# Patient Record
Sex: MALE | Race: WHITE | NOT HISPANIC OR LATINO | Employment: FULL TIME | URBAN - METROPOLITAN AREA
[De-identification: names, ages, dates, MRNs, and addresses within clinical notes are randomized per-mention and may not be internally consistent; named-entity substitution may affect disease eponyms.]

---

## 2017-05-24 ENCOUNTER — OFFICE VISIT (OUTPATIENT)
Dept: PULMONOLOGY | Facility: CLINIC | Age: 37
End: 2017-05-24
Payer: COMMERCIAL

## 2017-05-24 VITALS
WEIGHT: 237 LBS | DIASTOLIC BLOOD PRESSURE: 78 MMHG | OXYGEN SATURATION: 97 % | BODY MASS INDEX: 28.86 KG/M2 | HEART RATE: 85 BPM | HEIGHT: 76 IN | SYSTOLIC BLOOD PRESSURE: 122 MMHG

## 2017-05-24 DIAGNOSIS — J32.9 SINUSITIS, UNSPECIFIED CHRONICITY, UNSPECIFIED LOCATION: Primary | ICD-10-CM

## 2017-05-24 DIAGNOSIS — R05.9 COUGH: Primary | ICD-10-CM

## 2017-05-24 PROCEDURE — 99999 PR PBB SHADOW E&M-EST. PATIENT-LVL III: CPT | Mod: PBBFAC,,, | Performed by: INTERNAL MEDICINE

## 2017-05-24 PROCEDURE — 99204 OFFICE O/P NEW MOD 45 MIN: CPT | Mod: 25,S$GLB,, | Performed by: INTERNAL MEDICINE

## 2017-05-24 PROCEDURE — 1160F RVW MEDS BY RX/DR IN RCRD: CPT | Mod: S$GLB,,, | Performed by: INTERNAL MEDICINE

## 2017-05-24 NOTE — PROGRESS NOTES
Subjective:       Patient ID: Juan José Ng is a 36 y.o. male.    Chief Complaint: No chief complaint on file.    36 year old female with hx of chronic sinusitis and sinus surgeries. Patient states that his symptoms started when he was 14 years old. He has had rhinitis and nasal drainage for many years. He feels no allergy medicines have improved his symptoms. He follows with Dr. Ring, ENT at [facility]. He has not had insurance until recently and so he hasn't had appropriate follow up. He has had a negative allergy and immunodeficiency work up. He continues to have nasal drainage and yellowish sputum. His private ENT suggested he come to [facility] for bronchoscopy. He has been on multiple courses of antibiotics. Denies fever, chills or unintentional weight loss. His sinus issues have caused him significant depression and debility.       Review of Systems   Constitutional: Positive for fatigue and weakness. Negative for chills, activity change, appetite change and night sweats.   HENT: Positive for nosebleeds, postnasal drip, rhinorrhea, sinus pressure and congestion.    Eyes: Positive for redness and itching.   Respiratory: Positive for cough, shortness of breath and asthma nighttime symptoms. Negative for wheezing and dyspnea on extertion.    Cardiovascular: Negative for chest pain and leg swelling.   Endocrine: Negative for cold intolerance and heat intolerance.    Musculoskeletal: Negative for myalgias.   Skin: Negative for rash.   Gastrointestinal: Negative for nausea and vomiting.   Neurological: Positive for weakness, light-headedness and headaches.   Hematological: Bleeds easily. No excessive bruising.   Psychiatric/Behavioral: Positive for sleep disturbance. Negative for confusion. The patient is nervous/anxious.        Past Medical History:   Diagnosis Date    Allergy     Anxiety     Depression     Recurrent upper respiratory infection (URI)      Past Surgical History:   Procedure Laterality Date     "APPENDECTOMY      SINUS SURGERY       Family History   Problem Relation Age of Onset    Hypertension Father      Social History     Social History    Marital status: Significant Other     Spouse name: N/A    Number of children: N/A    Years of education: N/A     Occupational History    Not on file.     Social History Main Topics    Smoking status: Never Smoker    Smokeless tobacco: Never Used    Alcohol use Yes    Drug use: Unknown    Sexual activity: Not on file     Other Topics Concern    Not on file     Social History Narrative    No narrative on file     Review of patient's allergies indicates:  No Known Allergies      Objective:       Vitals:    05/24/17 1001   BP: 122/78   Pulse: 85   SpO2: 97%   Weight: 107.5 kg (236 lb 15.9 oz)   Height: 6' 4" (1.93 m)       Physical Exam   Constitutional: He is oriented to person, place, and time. He appears well-developed and well-nourished. No distress.   HENT:   Head: Normocephalic.   Nose: Mucosal edema present. Polyps are present.   Neck: Normal range of motion.   Cardiovascular: Normal rate and regular rhythm.    Pulmonary/Chest: Normal expansion, effort normal and breath sounds normal.   Abdominal: Soft. Bowel sounds are normal. He exhibits no mass. There is no guarding.   Musculoskeletal: Normal range of motion. He exhibits no edema or deformity.   Neurological: He is alert and oriented to person, place, and time.   Skin: Skin is warm and dry. He is not diaphoretic.   Psychiatric: He has a normal mood and affect. His behavior is normal.   Nursing note and vitals reviewed.    Personal Diagnostic Review  none pertinent  No flowsheet data found.      Assessment:       1. Sinusitis, unspecified chronicity, unspecified location        Outpatient Encounter Prescriptions as of 5/24/2017   Medication Sig Dispense Refill    levofloxacin (LEVAQUIN) 500 MG tablet Take 500 mg by mouth once daily.      omeprazole (PRILOSEC) 40 MG capsule Take 1 capsule (40 mg " total) by mouth every morning. 30 capsule 3     No facility-administered encounter medications on file as of 5/24/2017.      Orders Placed This Encounter   Procedures    Ambulatory consult to ENT     Referral Priority:   Routine     Referral Type:   Consultation     Referral Reason:   Specialty Services Required     Requested Specialty:   Otolaryngology     Number of Visits Requested:   1     Plan:          36 year old male with     Sinusitis- Patient has significant symptoms of chronic sinusitis. He has had extensive sinus surgery. I suspect the vast majority of his symptoms are related to his sinuses. He has had a negative immunodeficiency work up, negative CT scans and normal PFTs. I suggested patient get a repeat CT chest if he wanted to get re evaluated. He would like a referral to ENT prior to getting another CT chest. He states that flonase causes him nosebleeds and antihistamines are ineffective.   -ENT referral    Follow up after ENT evaluation.     Rasta Payne MD

## 2017-07-05 ENCOUNTER — OFFICE VISIT (OUTPATIENT)
Dept: OTOLARYNGOLOGY | Facility: CLINIC | Age: 37
End: 2017-07-05
Payer: COMMERCIAL

## 2017-07-05 VITALS
HEART RATE: 76 BPM | WEIGHT: 237.19 LBS | DIASTOLIC BLOOD PRESSURE: 85 MMHG | BODY MASS INDEX: 28.87 KG/M2 | SYSTOLIC BLOOD PRESSURE: 127 MMHG

## 2017-07-05 DIAGNOSIS — J31.0 CHRONIC RHINITIS: Primary | ICD-10-CM

## 2017-07-05 DIAGNOSIS — J32.0 CHRONIC MAXILLARY SINUSITIS: ICD-10-CM

## 2017-07-05 PROCEDURE — 99999 PR PBB SHADOW E&M-EST. PATIENT-LVL III: CPT | Mod: PBBFAC,,, | Performed by: OTOLARYNGOLOGY

## 2017-07-05 PROCEDURE — 87075 CULTR BACTERIA EXCEPT BLOOD: CPT

## 2017-07-05 PROCEDURE — 31231 NASAL ENDOSCOPY DX: CPT | Mod: S$GLB,,, | Performed by: OTOLARYNGOLOGY

## 2017-07-05 PROCEDURE — 87070 CULTURE OTHR SPECIMN AEROBIC: CPT

## 2017-07-05 PROCEDURE — 87077 CULTURE AEROBIC IDENTIFY: CPT

## 2017-07-05 PROCEDURE — 87186 SC STD MICRODIL/AGAR DIL: CPT

## 2017-07-05 PROCEDURE — 99244 OFF/OP CNSLTJ NEW/EST MOD 40: CPT | Mod: 25,S$GLB,, | Performed by: OTOLARYNGOLOGY

## 2017-07-05 NOTE — PROCEDURES
Nasal/sinus endoscopy  Date/Time: 7/5/2017 4:07 PM  Performed by: BLAZE SHAW  Authorized by: BLAZE SHAW     Consent Done?:  Yes (Verbal)  Anesthesia:     Local anesthetic:  Lidocaine 4% and Chris-Synephrine 1/2%    Patient tolerance:  Patient tolerated the procedure well with no immediate complications  Nose:     Procedure Performed:  Nasal Endoscopy  External:      No external nasal deformity  Intranasal:      Mucosa no polyps     Mucosa ulcers not present     No mucosa lesions present     Turbinates not enlarged     No septum gross deformity  Nasopharynx:      No mucosa lesions     Adenoids not present     Posterior choanae patent     Eustachian tube patent     Mostly patent sinuses bilaterally.  Diffuse mucosal edema, worse on left.  Thick white mucopurulent exudate pooling in left maxillary sinus, swabbed for culture.

## 2017-07-05 NOTE — LETTER
2017    Rasta Payne MD  [address]  [address] [place] [address]           OTOLARYNGOLOGY AND COMMUNICATION SCIENCES    Rey Rubio MD, FACS          SURGICAL AND MEDICAL DISEASES OF HEAD AND NECK  MD Rey Salas MD, FACS  Doroteo Celestin III, MD    OTOLOGY, NEUROTOLOGY and SKULL-BASE SURGERY  Eliceo Conn MD, FACS  Yoshi Goss MD, Director    PEDIATRIC OTOLARYNGOLOGY & OTOLOGY  BRIANA Meehan MD, FAAP  Karina Steen MD, FACS    FACIAL PLASTIC and RECONSTRUCTIVE SURGERY  ELLIE Oliveros III, MD, FACS    RHINOLOGY and SINUS SURGERY  Freddy Herrera MD, MPH, FACS  Director   ELLIE Oliveros III, MD, FACS    LARYNGOLOGY  Chalo Childers MD    SPEECH-LANGUAGE PATHOLOGY  Shayna Harper, PhD, [facility]-SLP  Melba López, MS, CCC-SLP  Arlette Rhodes, MS, CCC-SLP  Ally Chou MA, [facility]-SLP    AUDIOLOGY SECTION  Heather Chin, MS, CCC-A  LO Bauer, CCC-A  Rylee Gonzalez, Aurelia, CCC-A  Aurelia Renee, CCC-A  Freddie Espinal Jr., LO, CCA-A  LO Tijerina, CCC-A  Aurelia Morillo, CCC-A    [facility]  Head and Neck Surgical Oncology  RAMEZ Lloyd, FNP-C  Pedatric Otolaryngology  RAMEZ Bender, PNP-C       Re:  Juan José Ng  :  1980    Dear Dr. Payne,    Thank you for referring your patient, Juan José Ng, to us for evaluation and treatment.  I have enclosed a copy of my clinic note for your review and records.  If you have any questions please do not hesitate to contact our office.     Thank you for allowing me to participate in the care of your patient.    Sincerely,        Freddy Herrera MD, MPH, FACS    Director, Rhinology and Sinus Surgery  Department of Otorhinolaryngology  [facility]    Encl:  Progress note       [facility] [address] [place] [address]  phone [phone]  fax [phone]   [email or web address]

## 2017-07-05 NOTE — PROGRESS NOTES
Subjective:      Juan José Ng is a 36 y.o. male who was referred to me by Dr. Rasta Payne in consultation for sinusitis. Patient notes a lifelong history of chronic rhinosinusitis. He states he had a surgery done in 1998 which he believes was endoscopic sinus surgery, however he states it did not improve his symptoms. He notes history facial pressure/facial pain L>R, decreased sense of smell, intermittent nasal obstruction, constant nasal congestion and frequent nose blowing with large amounts of yellow mucus. States his symptoms affect his quality of life and result in missing work, difficulty concentrating, fatigue, and depression. Denies history of allergy, states he has previously been allergy tested and results were negative.     Patient states he has a history of antibiotic resistant sinus infections including pasteurella. Has been on multiple medications including doxycycline, multiple 3rd generation cephalosporins, levaquin, levofloxacin, and metronidazole. States he has at least 3-4 acute worsening of his chronic sinus disease each year requiring antibiotic therapy. Notes at one point he was on continuous cyclical antibiotic therapy.     Notes he has been tested for immune deficiencies in the past including CF, ciliary dyskinesia, and more which were all negative.  Patient states he has tried multiple medications. In addition to multiple yearly courses of antibiotics and oral steroids he has attempted oral antihistamines and nasal steroid spray. Notes the nasal steroid have always resulted in epistaxis.       SNOT-22 score = 72, NOSE score = 80%    Global QOL = 35%    Days missed = Less than 5    PTC = deferred      Past Medical History  He has a past medical history of Allergy; Anxiety; Depression; and Recurrent upper respiratory infection (URI).    Past Surgical History  He has a past surgical history that includes Appendectomy and Sinus surgery.    Family History  His family history  includes Hypertension in his father.    Social History  He reports that he has never smoked. He has never used smokeless tobacco. He reports that he drinks alcohol.    Allergies  He has No Known Allergies.    Medications   He currently has no medications in their medication list.    Review of Systems  Review of Systems   Constitutional: Negative for chills, fatigue, fever and unexpected weight change.   HENT: Positive for congestion, postnasal drip, rhinorrhea, sinus pressure and sneezing. Negative for dental problem, ear discharge, ear pain, facial swelling, hearing loss, hoarse voice, nosebleeds, sore throat, tinnitus, trouble swallowing and voice change.    Eyes: Negative for photophobia, pain, discharge, redness, itching and visual disturbance.   Respiratory: Negative for apnea, cough, shortness of breath and wheezing.    Cardiovascular: Negative for chest pain and palpitations.   Gastrointestinal: Negative for abdominal pain, nausea and vomiting.   Endocrine: Negative for cold intolerance and heat intolerance.   Genitourinary: Negative for difficulty urinating.   Musculoskeletal: Negative for arthralgias, back pain, myalgias, neck pain and neck stiffness.   Skin: Negative.  Negative for rash.   Allergic/Immunologic: Negative for environmental allergies and food allergies.   Neurological: Negative for dizziness, seizures, syncope, weakness and headaches.   Hematological: Negative for adenopathy. Does not bruise/bleed easily.   Psychiatric/Behavioral: Positive for dysphoric mood. Negative for decreased concentration and sleep disturbance. The patient is not nervous/anxious.           Objective:     /85   Pulse 76   Wt 107.6 kg (237 lb 3.4 oz)   BMI 28.87 kg/m²        Constitutional:   Vital signs are normal. He appears well-developed and well-nourished. He is cooperative. Normal speech.  No hoarse voice.      Head:  Normocephalic. Salivary glands normal.  Facial strength is normal.      Ears:  Right ear  hearing normal to normal and whispered voice; external ear normal without scars, lesions, or masses; ear canal, tympanic membrane, and middle ear normal. and left ear hearing normal to normal and whispered voice; external ear normal without scars, lesions, or masses; ear canal, tympanic membrane, and middle ear normal..   Right Ear: No drainage or tenderness. Tympanic membrane is not perforated. Tympanic membrane mobility is normal. No middle ear effusion. No decreased hearing is noted.   Left Ear: No drainage or tenderness. Tympanic membrane is not perforated. Tympanic membrane mobility is normal.  No middle ear effusion. No decreased hearing is noted.     Nose:  Mucosal edema present. No rhinorrhea, septal deviation or polyps. No epistaxis. Turbinates normal, no turbinate masses and no turbinate hypertrophy.  Right sinus exhibits no maxillary sinus tenderness and no frontal sinus tenderness. Left sinus exhibits no maxillary sinus tenderness and no frontal sinus tenderness.     Mouth/Throat  Oropharynx clear and moist without lesions or asymmetry, normal uvula midline, lips, teeth, and gums normal and oropharynx normal. He does not have dentures. Normal dentition. No oral lesions or mucous membrane lesions. No oropharyngeal exudate or posterior oropharyngeal erythema. Mirror exam not performed due to patient tolerance.  Mirror exam not performed due to patient tolerance.      Neck:  Neck normal without thyromegaly masses, asymmetry, normal tracheal structure, crepitus, and tenderness, thyroid normal, trachea normal, full range of motion with neck supple and no adenopathy. Thyroid tenderness is present. Normal range of motion and no crepitus present.     He has no cervical adenopathy.     Cardiovascular:   Regular rhythm and normal pulses.      Pulmonary/Chest:   Effort normal and effort and breath sounds normal. No respiratory distress.     Psychiatric:   He has a normal mood and affect. His speech is normal and  behavior is normal.     Neurological:   No cranial nerve deficit.     Skin:   No rash noted.       Procedure    Nasal endoscopy performed.  See procedure note.      Left nasal valve     Left MT prior resection with mucus, swabbed for culture     Left ethmoid     Right nasal valve     Right ethmoid     Right maxillary antrostomy        Data Reviewed    WBC (K/uL)   Date Value   10/28/2015 3.78 (L)     Eosinophil% (%)   Date Value   10/28/2015 3.2     Eos # (K/uL)   Date Value   10/28/2015 0.1     Platelets (K/uL)   Date Value   10/28/2015 138 (L)     No results found for: GLU  IgE (IU/mL)   Date Value   10/28/2015 <35       I independently reviewed the images of the CT sinuses dated 2008. Pertinent findings include patchy opacification and circumerential thickening of maxillaries with prior surgery.       Assessment:     1. Chronic rhinitis    2. Chronic maxillary sinusitis         Plan:     I had a long discussion with the patient regarding his condition and the further workup and management options.  I swabbed his sinus exudate for culture and will use the results to direct antibiotic therapy as indicated.  I counseled him that this would likely include topical antibiotics to increase the potency at the site and combat presumptive biofilms.    Return for test results.

## 2017-07-05 NOTE — Clinical Note
July 5, 2017      Rasta Payne MD  [address]  [address]  [place] [address]           [facility] - Otorhinolaryngology  [address]  [facility] [address]  Phone: [phone]  Fax: [phone]          Patient: Juan José Ng   MR Number: [id number]   YOB: 1980   Date of Visit: 7/5/2017       Dear Dr. Rasta Payne:    Thank you for referring Juan José Ng to me for evaluation. Attached you will find relevant portions of my assessment and plan of care.    If you have questions, please do not hesitate to call me. I look forward to following Juan José Ng along with you.    Sincerely,    Freddy Herrera MD    Enclosure  CC:  No Recipients    If you would like to receive this communication electronically, please contact [email or web address] or [phone] to request more information on [facility] access.    For providers and/or their staff who would like to refer a patient to [facility], please contact us through our one-stop-shop provider referral line, [facility], at [phone].    If you feel you have received this communication in error or would no longer like to receive these types of communications, please e-mail [email or web address]

## 2017-07-06 ENCOUNTER — OFFICE VISIT (OUTPATIENT)
Dept: INTERNAL MEDICINE | Facility: CLINIC | Age: 37
End: 2017-07-06
Payer: COMMERCIAL

## 2017-07-06 VITALS
OXYGEN SATURATION: 97 % | HEIGHT: 76 IN | DIASTOLIC BLOOD PRESSURE: 78 MMHG | HEART RATE: 83 BPM | SYSTOLIC BLOOD PRESSURE: 124 MMHG | TEMPERATURE: 98 F | BODY MASS INDEX: 29.16 KG/M2 | WEIGHT: 239.44 LBS

## 2017-07-06 DIAGNOSIS — J31.0 CHRONIC RHINITIS: ICD-10-CM

## 2017-07-06 DIAGNOSIS — Z23 NEED FOR VACCINATION: ICD-10-CM

## 2017-07-06 DIAGNOSIS — Z00.00 VISIT FOR ANNUAL HEALTH EXAMINATION: Primary | ICD-10-CM

## 2017-07-06 DIAGNOSIS — Z13.220 SCREENING, LIPID: ICD-10-CM

## 2017-07-06 DIAGNOSIS — Z13.1 SCREENING FOR DIABETES MELLITUS: ICD-10-CM

## 2017-07-06 DIAGNOSIS — F32.9 MAJOR DEPRESSION, CHRONIC: ICD-10-CM

## 2017-07-06 PROCEDURE — 99999 PR PBB SHADOW E&M-EST. PATIENT-LVL IV: CPT | Mod: PBBFAC,,, | Performed by: INTERNAL MEDICINE

## 2017-07-06 PROCEDURE — 99395 PREV VISIT EST AGE 18-39: CPT | Mod: 25,S$GLB,, | Performed by: INTERNAL MEDICINE

## 2017-07-06 PROCEDURE — 90715 TDAP VACCINE 7 YRS/> IM: CPT | Mod: S$GLB,,, | Performed by: INTERNAL MEDICINE

## 2017-07-06 PROCEDURE — 90471 IMMUNIZATION ADMIN: CPT | Mod: S$GLB,,, | Performed by: INTERNAL MEDICINE

## 2017-07-06 NOTE — PROGRESS NOTES
INTERNAL MEDICINE INITIAL VISIT NOTE      CHIEF COMPLAINT     Chief Complaint   Patient presents with    [facility]       HPI     Juan José Ng is a 36 y.o.  male who presents with a PMHx of chronic rhinitis (had some type of sinus surgery in '98 s improvement of sx) and has seen various subspecialists in the past including ID, allergy, pulm, and ENT c w/u including neg immunodeficiency w/u, normal PFTs, CT sinuses, nasal/sinus endoscopy (done yesterday by Dr. Herrera--no polyps, no mucosal lesions, diffuse mucosal edema L>R but patent sinuses, mucopurulent exudate pooling in L max sinus swabbed for cx).    Of note, flonase in the past has caused epistaxis and antihistamines have been ineffective.    Seen by Dr. Payne/Pulparveen who rec f/u p ENT eval.    Allergy/Imm testing by Dr. Proctor in 2015 c normal IgG and IgA but only 3/14 specific Ab to pneumococcal Ag in protective range.  Per note, Workup for AFS was negative with IgE <35 and no mold sensitization. May be partly secondary to history of sinus surgery. Cilia syndromes evaluated and negative. HIV negative.    Reports ENT is awaiting cx results to determine plan for tx.    Also c depression x 17 yrs.  Still c depressive sx but reports they are stable.  Has tried cymbalta, lexapro, zoloft, none of which seemed to help or has had issues c s/e (says he tried each med for several months).  Most recently took wellbutrin but says it made him very groggy and drowsy.  Denies si/hi.  Also c chronic issues c insomnia.    Also reports remote hx of hematospermia which lasted several months.  [place] and had rectal exam, scrotal u/s, labs and told he had a stone in the seminal vesicles and would need surgery but then said sx resolved spontaneously so was ultimately told surgery was not necessary and has not had issues since.    Past Medical History:  Past Medical History:   Diagnosis Date    Allergy     Anxiety     Depression     Major  depression, chronic 7/6/2017    Recurrent upper respiratory infection (URI)        Past Surgical History:  Past Surgical History:   Procedure Laterality Date    APPENDECTOMY      SINUS SURGERY         Home Medications:  Prior to Admission medications    Not on File       Allergies:  Review of patient's allergies indicates:  No Known Allergies    Family History:  Family History   Problem Relation Age of Onset    Diverticulitis Mother     Hypertension Father     Diabetes Paternal Aunt     Diabetes Paternal Uncle     Cancer Cousin      some type of intestinal cancer       Social History:  Social History   Substance Use Topics    Smoking status: Never Smoker    Smokeless tobacco: Never Used    Alcohol use No       Review of Systems:  Review of Systems   Constitutional: Negative for activity change, appetite change, chills, fatigue, fever and unexpected weight change.   HENT: Positive for rhinorrhea and sinus pressure. Negative for congestion, hearing loss and trouble swallowing.    Eyes: Negative for pain, discharge and visual disturbance.   Respiratory: Negative for cough, chest tightness, shortness of breath and wheezing.    Cardiovascular: Negative for chest pain, palpitations and leg swelling.   Gastrointestinal: Negative for abdominal distention, abdominal pain, blood in stool, constipation, diarrhea and vomiting.   Endocrine: Negative for polydipsia and polyuria.   Genitourinary: Negative for decreased urine volume, difficulty urinating, discharge, dysuria, frequency, hematuria and urgency.   Musculoskeletal: Negative for arthralgias, joint swelling and neck pain.   Neurological: Negative for dizziness, weakness, numbness and headaches.   Psychiatric/Behavioral: Positive for dysphoric mood and sleep disturbance. Negative for behavioral problems and confusion.       Health Maintenance:   Immunizations:   Influenza is not up to date, rec this Fall  TDap is not up to date, will give today  Pneumovax is up  "to date. 2015 per pt    Cancer Screening:  n/a      PHYSICAL EXAM     /78 (BP Location: Left arm, Patient Position: Sitting)   Pulse 83   Temp 98.3 °F (36.8 °C)   Ht 6' 4" (1.93 m)   Wt 108.6 kg (239 lb 6.7 oz)   SpO2 97%   BMI 29.14 kg/m²     GEN - A+OX4, NAD   HEENT - PERRL, EOMI, OP clear  Neck - No thyromegaly or cervical LAD. No thyroid masses felt.  CV - RRR, no m/r/g  Chest - CTAB, no wheezing, crackles, or rhonchi  Abd - S/NT/ND/+BS.   Ext - 2+BDP and radial pulses. No C/C/E.  Neuro - 5/5 BUE and BLE strength.  LN - No LAD appreciated.  Skin - Normal color and texture, no rash, no skin lesions.      LABS     To be drawn tomorrow when fasting    ASSESSMENT/PLAN     Juan José Ng is a 36 y.o. male with  Juan José was seen today for establish care.    Diagnoses and all orders for this visit:    Visit for annual health examination    Chronic rhinitis  -     As per HPI, extensive hx c extensive w/u, rec cont f/u c ENT, awaiting cx from recent procedure.  - Will check basic labs.  - CBC auto differential; Future; Expected date: 07/06/2017    Major depression, chronic  -     As per HPI, failed mult tx options in the past, does not want to start any meds right now, prefers to see psych first  - Ambulatory Referral to Psychiatry, given number to call for appt  -     TSH; Future; Expected date: 07/06/2017    Need for vaccination  -     (In Office Administered) Tdap Vaccine    Screening for diabetes mellitus  -     Comprehensive metabolic panel; Future; Expected date: 07/06/2017  -     Hemoglobin A1c; Future; Expected date: 07/06/2017  -     URINALYSIS; Future; Expected date: 07/07/2017    Screening, lipid  -     Lipid panel; Future; Expected date: 07/06/2017      HM as above    RTC in 6-12 months, sooner if needed and depending on labs.    Lina Silva MD  Department of Internal Medicine - [facility]  07/06/2017   12:46 PM    "

## 2017-07-07 ENCOUNTER — LAB VISIT (OUTPATIENT)
Dept: LAB | Facility: HOSPITAL | Age: 37
End: 2017-07-07
Attending: INTERNAL MEDICINE
Payer: COMMERCIAL

## 2017-07-07 DIAGNOSIS — J31.0 CHRONIC RHINITIS: ICD-10-CM

## 2017-07-07 DIAGNOSIS — Z13.1 SCREENING FOR DIABETES MELLITUS: ICD-10-CM

## 2017-07-07 DIAGNOSIS — Z13.220 SCREENING, LIPID: ICD-10-CM

## 2017-07-07 DIAGNOSIS — F32.9 MAJOR DEPRESSION, CHRONIC: ICD-10-CM

## 2017-07-07 LAB
ALBUMIN SERPL BCP-MCNC: 3.9 G/DL
ALP SERPL-CCNC: 66 U/L
ALT SERPL W/O P-5'-P-CCNC: 25 U/L
ANION GAP SERPL CALC-SCNC: 8 MMOL/L
AST SERPL-CCNC: 23 U/L
BASOPHILS # BLD AUTO: 0.02 K/UL
BASOPHILS NFR BLD: 0.4 %
BILIRUB SERPL-MCNC: 1.3 MG/DL
BUN SERPL-MCNC: 11 MG/DL
CALCIUM SERPL-MCNC: 9.1 MG/DL
CHLORIDE SERPL-SCNC: 105 MMOL/L
CHOLEST/HDLC SERPL: 5.3 {RATIO}
CO2 SERPL-SCNC: 28 MMOL/L
CREAT SERPL-MCNC: 0.9 MG/DL
DIFFERENTIAL METHOD: ABNORMAL
EOSINOPHIL # BLD AUTO: 0.1 K/UL
EOSINOPHIL NFR BLD: 1.7 %
ERYTHROCYTE [DISTWIDTH] IN BLOOD BY AUTOMATED COUNT: 13.5 %
EST. GFR  (AFRICAN AMERICAN): >60 ML/MIN/1.73 M^2
EST. GFR  (NON AFRICAN AMERICAN): >60 ML/MIN/1.73 M^2
ESTIMATED AVG GLUCOSE: 97 MG/DL
GLUCOSE SERPL-MCNC: 97 MG/DL
HBA1C MFR BLD HPLC: 5 %
HCT VFR BLD AUTO: 42.1 %
HDL/CHOLESTEROL RATIO: 18.8 %
HDLC SERPL-MCNC: 165 MG/DL
HDLC SERPL-MCNC: 31 MG/DL
HGB BLD-MCNC: 14.5 G/DL
LDLC SERPL CALC-MCNC: 80.6 MG/DL
LYMPHOCYTES # BLD AUTO: 0.7 K/UL
LYMPHOCYTES NFR BLD: 14.2 %
MCH RBC QN AUTO: 29.7 PG
MCHC RBC AUTO-ENTMCNC: 34.4 %
MCV RBC AUTO: 86 FL
MONOCYTES # BLD AUTO: 0.7 K/UL
MONOCYTES NFR BLD: 12.9 %
NEUTROPHILS # BLD AUTO: 3.7 K/UL
NEUTROPHILS NFR BLD: 70.8 %
NONHDLC SERPL-MCNC: 134 MG/DL
PLATELET # BLD AUTO: 114 K/UL
PMV BLD AUTO: 11.5 FL
POTASSIUM SERPL-SCNC: 3.7 MMOL/L
PROT SERPL-MCNC: 7 G/DL
RBC # BLD AUTO: 4.89 M/UL
SODIUM SERPL-SCNC: 141 MMOL/L
TRIGL SERPL-MCNC: 267 MG/DL
TSH SERPL DL<=0.005 MIU/L-ACNC: 1.25 UIU/ML
WBC # BLD AUTO: 5.2 K/UL

## 2017-07-07 PROCEDURE — 80053 COMPREHEN METABOLIC PANEL: CPT

## 2017-07-07 PROCEDURE — 85025 COMPLETE CBC W/AUTO DIFF WBC: CPT

## 2017-07-07 PROCEDURE — 84443 ASSAY THYROID STIM HORMONE: CPT

## 2017-07-07 PROCEDURE — 83036 HEMOGLOBIN GLYCOSYLATED A1C: CPT

## 2017-07-07 PROCEDURE — 80061 LIPID PANEL: CPT

## 2017-07-07 PROCEDURE — 36415 COLL VENOUS BLD VENIPUNCTURE: CPT

## 2017-07-08 LAB — BACTERIA SPEC AEROBE CULT: NORMAL

## 2017-07-10 ENCOUNTER — LAB VISIT (OUTPATIENT)
Dept: LAB | Facility: HOSPITAL | Age: 37
End: 2017-07-10
Attending: INTERNAL MEDICINE
Payer: COMMERCIAL

## 2017-07-10 ENCOUNTER — TELEPHONE (OUTPATIENT)
Dept: INTERNAL MEDICINE | Facility: CLINIC | Age: 37
End: 2017-07-10

## 2017-07-10 DIAGNOSIS — D69.6 THROMBOCYTOPENIA: ICD-10-CM

## 2017-07-10 DIAGNOSIS — R17 SERUM TOTAL BILIRUBIN ELEVATED: ICD-10-CM

## 2017-07-10 DIAGNOSIS — D69.6 THROMBOCYTOPENIA: Primary | ICD-10-CM

## 2017-07-10 LAB
BACTERIA SPEC ANAEROBE CULT: NORMAL
BILIRUB DIRECT SERPL-MCNC: 0.4 MG/DL
FOLATE SERPL-MCNC: 11.6 NG/ML
HAV IGM SERPL QL IA: NEGATIVE
HBV CORE IGM SERPL QL IA: NEGATIVE
HBV SURFACE AG SERPL QL IA: NEGATIVE
HCV AB SERPL QL IA: NEGATIVE
HIV 1+2 AB+HIV1 P24 AG SERPL QL IA: NEGATIVE
VIT B12 SERPL-MCNC: 760 PG/ML

## 2017-07-10 PROCEDURE — 82746 ASSAY OF FOLIC ACID SERUM: CPT

## 2017-07-10 PROCEDURE — 82248 BILIRUBIN DIRECT: CPT

## 2017-07-10 PROCEDURE — 82607 VITAMIN B-12: CPT

## 2017-07-10 PROCEDURE — 86703 HIV-1/HIV-2 1 RESULT ANTBDY: CPT

## 2017-07-10 PROCEDURE — 36415 COLL VENOUS BLD VENIPUNCTURE: CPT

## 2017-07-10 PROCEDURE — 80074 ACUTE HEPATITIS PANEL: CPT

## 2017-07-10 NOTE — TELEPHONE ENCOUNTER
Pt notified of results.  Trig a little high, LDL normal, denies etoh and a1c wnl.  Rec low fat diet for now and can repeat at f/u.  plts a little low, will check HIV, hep panel, b12, folate.  T bili borderline elevated, will fractionate.    Pt to come today at 10:30 for additional labs.

## 2017-07-13 ENCOUNTER — TELEPHONE (OUTPATIENT)
Dept: OTOLARYNGOLOGY | Facility: CLINIC | Age: 37
End: 2017-07-13

## 2017-07-13 NOTE — TELEPHONE ENCOUNTER
The sinus culture showed staph.  I'm calling in a compounded clindamycin rinse from [facility].  Please let him know.

## 2017-07-24 ENCOUNTER — PATIENT MESSAGE (OUTPATIENT)
Dept: OTOLARYNGOLOGY | Facility: CLINIC | Age: 37
End: 2017-07-24

## 2017-07-24 ENCOUNTER — PATIENT MESSAGE (OUTPATIENT)
Dept: INTERNAL MEDICINE | Facility: CLINIC | Age: 37
End: 2017-07-24

## 2017-08-10 ENCOUNTER — OFFICE VISIT (OUTPATIENT)
Dept: PSYCHIATRY | Facility: CLINIC | Age: 37
End: 2017-08-10
Payer: COMMERCIAL

## 2017-08-10 DIAGNOSIS — F32.9 MAJOR DEPRESSION, CHRONIC: Primary | ICD-10-CM

## 2017-08-10 PROCEDURE — 99999 PR PBB SHADOW E&M-EST. PATIENT-LVL II: CPT | Mod: PBBFAC,,, | Performed by: SOCIAL WORKER

## 2017-08-10 PROCEDURE — 90791 PSYCH DIAGNOSTIC EVALUATION: CPT | Mod: S$GLB,,, | Performed by: SOCIAL WORKER

## 2017-08-10 NOTE — PROGRESS NOTES
Psychiatry Initial Visit (PhD/LCSW)  Diagnostic Interview - CPT 85587    Date: 8/10/2017    Site: [facility]    Referral source: his MD    Clinical status of patient: Outpatient    Juan José Ng, a 36 y.o. male, for initial evaluation visit.  Met with patient.    Chief complaint/reason for encounter: depression, mood swings, anxiety, sleep, appetite, behavior, cognition, somatic and interpersonal    History of present illness: has had depression for a long time and some sleep issues, and meds have not helped, and also his MD referred him here and has a sinus issue since age 19 that is hard and no cure has been helpful, and he finds psychiatric drugs not helpful and therapy is not helpful and he is , works part time, college educated, says he is not depressed, anxiety, has two brothers and he is the oldest,  for four years and wife is [employer] therapist named Catherine, age 34. He is looking for help with his depression, and he has hurt feelings all the time and hard to do things and feels depressed to the point of not doing anything, and he gets tired easily and has fatigue a lot, and is discouraged, feels like he wants some help and direction and to just plain feel better. Sinus is the major medical issues, does not have pain, feels agitated all the time from not being able to improve and or function better.    Pain: 0    Symptoms:   · Mood: depressed mood, diminished interest, insomnia, fatigue, worthlessness/guilt, poor concentration, tearfulness and social isolation  · Anxiety: decreased memory, excessive anxiety/worry, restlessness/keyed up, irritability and muscle tension  · Substance abuse: denied  · Cognitive functioning: denied  · Health behaviors: sinus infections and gets sick about every other week and has to go on anti biotics to improve.    Psychiatric history: psychotropic management by PCP and has participated in counseling/psychotherapy on an outpatient basis in the past    Medical  history: has tried medications and this has not helped and therapy has not helped.    Family history of psychiatric illness: depression    Social history (marriage, employment, etc.): lives with wife she works no children, marriage is good, he works part time.    Substance use:   Alcohol: none   Drugs: none   Tobacco: none   Caffeine: some    Current medications and drug reactions (include OTC, herbal): see medication list has tried Zoloft, lexapro, cymbalta, no help, and tried wellbrutin and had a very negative reaction to this.    Strengths and liabilities: Strength: Patient accepts guidance/feedback, Strength: Patient is expressive/articulate., Strength: Patient is intelligent., Strength: Patient is motivated for change., Strength: Patient is physically healthy., Strength: Patient has positive support network., Strength: Patient has reasonable judgment., Strength: Patient is stable., Liability: Patient is defensive., Liability: Patient is dependent., Liability: Patient lacks coping skills.    Current Evaluation:     Mental Status Exam:  General Appearance:  unremarkable, age appropriate   Speech: normal tone, normal rate, normal pitch, normal volume      Level of Cooperation: cooperative      Thought Processes: normal and logical   Mood: anxious, depressed, dysthymic, irritable, sad      Thought Content: normal, no suicidality, no homicidality, delusions, or paranoia   Affect: congruent and appropriate   Orientation: Oriented x3   Memory: recent >  intact, remote >  intact   Attention Span & Concentration: intact   Fund of General Knowledge: intact and appropriate to age and level of education   Abstract Reasoning: interpretation of similarities was concrete   Judgment & Insight: limited     Language  intact     Diagnostic Impression - Plan:       ICD-10-CM ICD-9-CM   1. Major depression, chronic F32.9 296.20       Plan:individual psychotherapy, group psychotherapy, consult psychiatrist for medication evaluation  and medication management by physician    Return to Clinic: 2 weeks    Length of Service (minutes): 30

## 2017-09-13 ENCOUNTER — OFFICE VISIT (OUTPATIENT)
Dept: PSYCHIATRY | Facility: CLINIC | Age: 37
End: 2017-09-13
Payer: COMMERCIAL

## 2017-09-13 VITALS
SYSTOLIC BLOOD PRESSURE: 129 MMHG | WEIGHT: 243.19 LBS | BODY MASS INDEX: 29.61 KG/M2 | DIASTOLIC BLOOD PRESSURE: 89 MMHG | HEIGHT: 76 IN | HEART RATE: 85 BPM

## 2017-09-13 DIAGNOSIS — F32.9 MAJOR DEPRESSION, CHRONIC: Primary | ICD-10-CM

## 2017-09-13 DIAGNOSIS — F33.1 MDD (MAJOR DEPRESSIVE DISORDER), RECURRENT EPISODE, MODERATE: Primary | ICD-10-CM

## 2017-09-13 PROCEDURE — 3008F BODY MASS INDEX DOCD: CPT | Mod: S$GLB,,, | Performed by: PSYCHIATRY & NEUROLOGY

## 2017-09-13 PROCEDURE — 99204 OFFICE O/P NEW MOD 45 MIN: CPT | Mod: S$GLB,,, | Performed by: PSYCHIATRY & NEUROLOGY

## 2017-09-13 PROCEDURE — 90832 PSYTX W PT 30 MINUTES: CPT | Mod: S$GLB,,, | Performed by: SOCIAL WORKER

## 2017-09-13 PROCEDURE — 99999 PR PBB SHADOW E&M-EST. PATIENT-LVL III: CPT | Mod: PBBFAC,,, | Performed by: PSYCHIATRY & NEUROLOGY

## 2017-09-13 RX ORDER — CLINDAMYCIN PHOSPHATE 150 MG/ML
2 INJECTION, SOLUTION INTRAVENOUS 2 TIMES DAILY PRN
COMMUNITY
Start: 2017-07-14 | End: 2017-11-10

## 2017-09-13 RX ORDER — FLUOXETINE HYDROCHLORIDE 20 MG/1
20 CAPSULE ORAL DAILY
Qty: 30 CAPSULE | Refills: 1 | Status: SHIPPED | OUTPATIENT
Start: 2017-09-13 | End: 2017-11-08

## 2017-09-13 NOTE — PATIENT INSTRUCTIONS
Try Prozac 20 mg one capsule daily for depression -- try in AM first.  If it makes you drowsy, take the dose in the evening.    Return to clinic on October 20, 2017, for follow up appt.

## 2017-09-13 NOTE — PROGRESS NOTES
Individual Psychotherapy (PhD/LCSW)    9/13/2017    Site:  [facility]         Therapeutic Intervention: Met with patient.  Outpatient - Insight oriented psychotherapy 30 min - CPT code 50906    Chief complaint/reason for encounter: depression, mood swings, anger, anxiety, sleep, behavior and interpersonal     Interval history and content of current session: he saw Dr. Shasta MD and I will consult with him, coping skills, health, mental health, job, marriage, being creative, and medical issues focused on, and how to take care of himself, self-esteem and how to have good boundaries and stay focused on what is important to him and get the help he needs. Depression, and sinus problems are his major issues, and he worries about himself and his future. Continue all therapy.    Treatment plan:  · Target symptoms: depression, recurrent depression, anxiety , mood swings, mood disorder, adjustment  · Why chosen therapy is appropriate versus another modality: relevant to diagnosis, patient responds to this modality, evidence based practice  · Outcome monitoring methods: self-report, observation  · Therapeutic intervention type: insight oriented psychotherapy, behavior modifying psychotherapy, supportive psychotherapy    Risk parameters:  Patient reports no suicidal ideation  Patient reports no homicidal ideation  Patient reports no self-injurious behavior  Patient reports no violent behavior    Verbal deficits: None    Patient's response to intervention:  The patient's response to intervention is accepting, guarded, motivated, reluctant.    Progress toward goals and other mental status changes:  The patient's progress toward goals is limited.    Diagnosis:     ICD-10-CM ICD-9-CM   1. Major depression, chronic F32.9 296.20       Plan:  individual psychotherapy and consult psychiatrist for medication evaluation    Return to clinic: 2 weeks    Length of Service (minutes): 30

## 2017-09-13 NOTE — PROGRESS NOTES
"Outpatient Psychiatry Initial Medication Evaluation Visit (MD/NP)    9/13/2017    Clinical Status of Patient:  Outpatient (Ambulatory)    Session Length:  60 mins (NP4)    Chief Complaint:  Juan José Ng is a 36 y.o. male who presents today for initial med evaluation visit of depression.  Met with patient.      Interval History and Content of Current Session:  Interim Events/Subjective Report/Content of Current Session:  First time seen by me.  Had seen Dr. Carlos Gooden in initial evaluation on 8/10/2017 (this note was reviewed just prior to seeing pt).      "I've been depressed for 17 years; nothing seems to affect it".    When he was in his 20's, "I was very up and down" -- his up periods were "normal" days when he felt OK and   Has taken Zoloft, Lexapro and Cymbalta in past -- he states he had no noticeable effect on or off the med.    He also saw a psychiatrist 2 years ago (can't recall name; on [address] in [place]) -- he tried Wellbutrin, then states he went from feeling tired to "totally shut down", so he had to stop this med.    He also has sporadic insomnia.      He has chronic sinusitis and rhinitis.  He has seen ENT physicians "all the time".  "Nothing ever turns up positive".  He states they did surgery by expanding the sinus passages and correcting a deviated septum.    Has "permanently inflamed sinuses", has recurrent sinus infections.  He is using a Neti pot to squirt fluid with an antibiotic into his sinuses.      He feels a loss of control with situations in his life.  "I wake up feeling terrible, I always feel terrible".    He has tried to research on trying to do what he can with lifestyle changes, but nothing seems to matter.    He wants to be more supportive of his wife.  "I can tell she is tired.  She is working her ass off".  They have been  3 years.    "Nothing fucking matters" (uses f-word several times out of frustrated during session).      However, he denies suicidal " "thoughts.  "I want to live, I want to experience life".  VERY pessimistic and defeatist attitude.         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  "I have never been able to function at the level that I intended myself to do".    He is .  His wife works full time teaching kids.  She has finished a graduate degree.  She also works 2 part time jobs: tutoring at [employer] and with a catering service over the weekend.    Finances are tight.  Pt states he grew up poor, has always been poor.  Has spent his adult life working low wage jobs.  He states his parents have always been supportive -- they have helped him financially.    "I get sicker every year that passes, physically".      He has spoken to a friend who did TMS; it helped, but the effects were fleeting.      Psychotherapy:  · Target symptoms: depression  · Why chosen therapy is appropriate versus another modality: relevant to diagnosis, patient responds to this modality  · Outcome monitoring methods: self-report, lab data, observation  · Therapeutic intervention type: supportive psychotherapy  · Topics discussed/themes: see above  · The patient's response to the intervention is reluctant.   · The patient's progress toward treatment goals is poor.   · Duration of intervention: 10 minutes.    Review of Systems   · PSYCHIATRIC: Pertinant items are noted in the narrative.  · CONSTITUTIONAL: + recent weight gain.   · MUSCULOSKELETAL: No pain or stiffness of the joints.  · NEUROLOGIC: No weakness, sensory changes, seizures, confusion, memory loss, tremor or other abnormal movements.  · ENDOCRINE: No polydipsia or polyuria.  · INTEGUMENTARY: No rashes or lacerations.  · EYES: No exophthalmos, jaundice or blindness.  · ENT: No dizziness, tinnitus or hearing loss.  · RESPIRATORY: No shortness of breath.  + chronic sinusitis and congestion  · CARDIOVASCULAR: No tachycardia or chest pain.  · GASTROINTESTINAL: No nausea, " "vomiting, pain, constipation or diarrhea.  · GENITOURINARY: No frequency, dysuria or sexual dysfunction.    Past Psychiatric History:  See above.     Took Cymbalta at 30 mg once daily -- he took samples from his PCP, but can't recall max dose.    He is also not sure what max doses of the Zoloft or Lexapro (also Rx years ago by his PCP).    He had tried Wellbutrin as Rx by his psychiatrist, but he had paradoxical effects (see above).        Past Medical, Family and Social History: The patient's past medical, family and social history have been reviewed and updated as appropriate within the electronic medical record -- see encounter notes.    Current Medications:      clindamycin (CLEOCIN) 150 mg/mL injection, 2 mg 2 (two) times daily as needed., Disp: , Rfl:     Compliance: yes    Side effects: None    Risk Parameters:  Patient reports no suicidal ideation  Patient reports no homicidal ideation  Patient reports no self-injurious behavior  Patient reports no violent behavior    Exam (detailed: at least 9 elements; comprehensive: all 15 elements)   Constitutional  Vitals:  Most recent vital signs, dated less than 90 days prior to this appointment, were reviewed.   Vitals:    09/13/17 1107   BP: 129/89   Pulse: 85   Weight: 110.3 kg (243 lb 3.2 oz)   Height: 6' 4" (1.93 m)     Vitals - 1 value per visit 5/24/2017 7/5/2017 7/6/2017   SYSTOLIC 122 127 124   DIASTOLIC 78 85 78   PULSE 85 76 83   TEMPERATURE   98.3   SPO2 97  97   Weight (lb) 236.99 237.22 239.42   Weight (kg) 107.5 107.6 108.6   HEIGHT 6' 4"  6' 4"   BODY MASS INDEX 28.85 28.87 29.14   VISIT REPORT      Pain Score  0 0 0        General:  unremarkable, age appropriate, overweight, generally cooperative, occasional eye contact     Musculoskeletal  Muscle Strength/Tone:  not examined   Gait & Station:  non-ataxic     Psychiatric  Speech:  no latency; no press, soft, spontaneous   Mood & Affect:  depressed  congruent and appropriate   Thought Process:  " goal-directed, logical   Associations:  intact   Thought Content:  normal, no suicidality, no homicidality, delusions, or paranoia   Insight:  intact, has awareness of illness   Judgement: behavior is adequate to circumstances   Orientation:  grossly intact   Memory: intact for content of interview   Language: grossly intact   Attention Span & Concentration:  able to focus   Fund of Knowledge:  intact and appropriate to age and level of education       Assessment and Diagnosis   Status/Progress: Based on the examination today, the patient's problem(s) is/are inadequately controlled and treatment resistant.  New problems have been presented today.   Co-morbidities are complicating management of the primary condition.  There are no active rule-out diagnoses for this patient at this time.     General Impression:    Major Depressive Disorder, Recurrent, Moderate        Intervention/Counseling/Treatment Plan   Medication Management:  Try Prozac 20 mg one capsule daily for depression -- try in AM first.  Risks/benefits of SSRI's noted, including potential for nausea, constipation, diarrhea, headache, sexual adverse effects and, rarely, hyponatremia.  I also noted SSRI's are NOT habit forming and can benefit uncontrolled anxiety and depression.       Additional Notes:  May consider ECT, though need to try some other med options.      Return to Clinic: ~ 1 month

## 2017-11-08 ENCOUNTER — OFFICE VISIT (OUTPATIENT)
Dept: OTOLARYNGOLOGY | Facility: CLINIC | Age: 37
End: 2017-11-08
Payer: COMMERCIAL

## 2017-11-08 VITALS
SYSTOLIC BLOOD PRESSURE: 132 MMHG | HEART RATE: 88 BPM | DIASTOLIC BLOOD PRESSURE: 88 MMHG | WEIGHT: 244.25 LBS | BODY MASS INDEX: 29.73 KG/M2 | TEMPERATURE: 98 F

## 2017-11-08 DIAGNOSIS — J31.0 CHRONIC RHINITIS, UNSPECIFIED TYPE: ICD-10-CM

## 2017-11-08 DIAGNOSIS — J32.4 CHRONIC PANSINUSITIS: Primary | ICD-10-CM

## 2017-11-08 DIAGNOSIS — R05.3 CHRONIC COUGH: ICD-10-CM

## 2017-11-08 PROCEDURE — 87186 SC STD MICRODIL/AGAR DIL: CPT

## 2017-11-08 PROCEDURE — 99999 PR PBB SHADOW E&M-EST. PATIENT-LVL III: CPT | Mod: PBBFAC,,, | Performed by: OTOLARYNGOLOGY

## 2017-11-08 PROCEDURE — 87075 CULTR BACTERIA EXCEPT BLOOD: CPT

## 2017-11-08 PROCEDURE — 87070 CULTURE OTHR SPECIMN AEROBIC: CPT

## 2017-11-08 PROCEDURE — 87102 FUNGUS ISOLATION CULTURE: CPT

## 2017-11-08 PROCEDURE — 99214 OFFICE O/P EST MOD 30 MIN: CPT | Mod: S$GLB,,, | Performed by: OTOLARYNGOLOGY

## 2017-11-08 RX ORDER — BUDESONIDE 0.5 MG/2ML
0.5 INHALANT ORAL DAILY
Qty: 60 ML | Refills: 1 | Status: SHIPPED | OUTPATIENT
Start: 2017-11-08 | End: 2017-11-10 | Stop reason: SDUPTHER

## 2017-11-08 NOTE — PROGRESS NOTES
Subjective:      Juan José is a 37 y.o. male who comes for follow-up of sinusitis.  Finished clindamycin rinse, doesn't believe it helped much, still thick postnasal drip, frontal pressure, cough, fatigue.  Tested for asthma in past and reportedly negative spirometry.  Recalls persistent symptoms through the years [place].    SNOT-22 score = 72, NOSE score = 85%    The patient's medications, allergies, past medical, surgical, social and family histories were reviewed and updated as appropriate.    A detailed review of systems was obtained with pertinent positives as per the above HPI, and otherwise negative.        Objective:     /88   Pulse 88   Temp 97.7 °F (36.5 °C)   Wt 110.8 kg (244 lb 4.3 oz)   BMI 29.73 kg/m²        Constitutional:   He appears well-developed. He is cooperative.     Head:  Normocephalic.     Nose:  No mucosal edema, rhinorrhea, septal deviation or polyps. No epistaxis. Turbinates normal, no turbinate masses and no turbinate hypertrophy.  Right sinus exhibits no maxillary sinus tenderness and no frontal sinus tenderness. Left sinus exhibits no maxillary sinus tenderness and no frontal sinus tenderness.     Mouth/Throat  Oropharynx clear and moist without lesions or asymmetry. No oropharyngeal exudate or posterior oropharyngeal erythema.     Neck:  No adenopathy. Normal range of motion present.     He has no cervical adenopathy.       Procedure    None        Data Reviewed    WBC (K/uL)   Date Value   07/07/2017 5.20     Eosinophil% (%)   Date Value   07/07/2017 1.7     Eos # (K/uL)   Date Value   07/07/2017 0.1     Platelets (K/uL)   Date Value   07/07/2017 114 (L)     Glucose (mg/dL)   Date Value   07/07/2017 97     IgE (IU/mL)   Date Value   10/28/2015 <35     Only tested for fungal antigens, all negative.      Assessment:     1. Chronic pansinusitis    2. Chronic rhinitis, unspecified type    3. Chronic cough         Plan:     Cultures taken from purulent  nasal discharge from blowing.  Rx budesonide sinus rinse BID.  Consider early AERD.  Return in about 1 month (around 12/8/2017).

## 2017-11-09 ENCOUNTER — PATIENT MESSAGE (OUTPATIENT)
Dept: OTOLARYNGOLOGY | Facility: CLINIC | Age: 37
End: 2017-11-09

## 2017-11-09 DIAGNOSIS — R05.3 CHRONIC COUGH: Primary | ICD-10-CM

## 2017-11-10 RX ORDER — BUDESONIDE 0.5 MG/2ML
0.5 INHALANT ORAL 2 TIMES DAILY
Qty: 120 ML | Refills: 0 | Status: SHIPPED | OUTPATIENT
Start: 2017-11-10 | End: 2018-07-19

## 2017-11-13 ENCOUNTER — PATIENT MESSAGE (OUTPATIENT)
Dept: OTOLARYNGOLOGY | Facility: CLINIC | Age: 37
End: 2017-11-13

## 2017-11-13 DIAGNOSIS — J32.4 CHRONIC PANSINUSITIS: Primary | ICD-10-CM

## 2017-11-13 LAB
BACTERIA SPEC AEROBE CULT: NORMAL
BACTERIA SPEC ANAEROBE CULT: NORMAL

## 2017-11-13 RX ORDER — AMOXICILLIN 500 MG/1
500 TABLET, FILM COATED ORAL EVERY 12 HOURS
Qty: 28 TABLET | Refills: 0 | Status: SHIPPED | OUTPATIENT
Start: 2017-11-13 | End: 2017-11-27

## 2017-12-14 LAB — FUNGUS SPEC CULT: NORMAL

## 2017-12-21 ENCOUNTER — OFFICE VISIT (OUTPATIENT)
Dept: OTOLARYNGOLOGY | Facility: CLINIC | Age: 37
End: 2017-12-21
Payer: COMMERCIAL

## 2017-12-21 VITALS
WEIGHT: 232.56 LBS | DIASTOLIC BLOOD PRESSURE: 80 MMHG | BODY MASS INDEX: 28.31 KG/M2 | HEART RATE: 94 BPM | SYSTOLIC BLOOD PRESSURE: 119 MMHG

## 2017-12-21 DIAGNOSIS — R05.3 CHRONIC COUGH: Primary | ICD-10-CM

## 2017-12-21 DIAGNOSIS — J31.0 CHRONIC RHINITIS, UNSPECIFIED TYPE: ICD-10-CM

## 2017-12-21 DIAGNOSIS — J32.4 CHRONIC PANSINUSITIS: ICD-10-CM

## 2017-12-21 PROCEDURE — 99999 PR PBB SHADOW E&M-EST. PATIENT-LVL III: CPT | Mod: PBBFAC,,, | Performed by: OTOLARYNGOLOGY

## 2017-12-21 PROCEDURE — 31231 NASAL ENDOSCOPY DX: CPT | Mod: S$GLB,,, | Performed by: OTOLARYNGOLOGY

## 2017-12-21 PROCEDURE — 99213 OFFICE O/P EST LOW 20 MIN: CPT | Mod: 25,S$GLB,, | Performed by: OTOLARYNGOLOGY

## 2017-12-21 NOTE — Clinical Note
He has sinus disease but the cough is clearly pulmonary in origin.  He wishes to have a return for pulmonary evaluation.

## 2017-12-21 NOTE — PROCEDURES
Nasal/sinus endoscopy  Date/Time: 12/21/2017 4:51 PM  Performed by: BLAZE SHAW  Authorized by: BLAZE SHAW     Consent Done?:  Yes (Verbal)  Anesthesia:     Local anesthetic:  Lidocaine 4% and Chris-Synephrine 1/2%    Patient tolerance:  Patient tolerated the procedure well with no immediate complications  Nose:     Procedure Performed:  Nasal Endoscopy  External:      No external nasal deformity  Intranasal:      Mucosa no polyps     Mucosa ulcers not present     No mucosa lesions present     Turbinates not enlarged     No septum gross deformity  Nasopharynx:      No mucosa lesions     Adenoids not present     Posterior choanae patent     Eustachian tube patent     Sinuses all clear, improved inflammation.  Minimal exudate.  No purulence or polyps.

## 2017-12-21 NOTE — PROGRESS NOTES
Subjective:      Juan José is a 37 y.o. male who comes for follow-up of sinusitis.  His last visit with me was on 11/8/2017.  Not much better physically, though less depressed.  Used budesonide rinse daily and amoxicillin after last visit, still bilaterally congested with daily, constant postnasal drip and cough productive of thick green purulent mucus.  Recalls multiple spirometry in past always negative.    SNOT-22 score = 69, NOSE score = 60%    The patient's medications, allergies, past medical, surgical, social and family histories were reviewed and updated as appropriate.    A detailed review of systems was obtained with pertinent positives as per the above HPI, and otherwise negative.        Objective:     /80   Pulse 94   Wt 105.5 kg (232 lb 9.4 oz)   BMI 28.31 kg/m²        Constitutional:   He appears well-developed. He is cooperative.     Head:  Normocephalic.     Nose:  No mucosal edema, rhinorrhea, septal deviation or polyps. No epistaxis. Turbinates normal, no turbinate masses and no turbinate hypertrophy.  Right sinus exhibits no maxillary sinus tenderness and no frontal sinus tenderness. Left sinus exhibits no maxillary sinus tenderness and no frontal sinus tenderness.     Mouth/Throat  Oropharynx clear and moist without lesions or asymmetry. No oropharyngeal exudate or posterior oropharyngeal erythema.     Neck:  No adenopathy. Normal range of motion present.     He has no cervical adenopathy.       Procedure    Nasal endoscopy performed.  See procedure note.     Left nasal valve     Left maxillary     Left ethmoid     Right nasal valve     Right maxillary     Right ethmoid        Data Reviewed    WBC (K/uL)   Date Value   07/07/2017 5.20     Eosinophil% (%)   Date Value   07/07/2017 1.7     Eos # (K/uL)   Date Value   07/07/2017 0.1     Platelets (K/uL)   Date Value   07/07/2017 114 (L)     Glucose (mg/dL)   Date Value   07/07/2017 97     IgE (IU/mL)   Date Value   10/28/2015 <35            Assessment:     1. Chronic cough    2. Chronic pansinusitis    3. Chronic rhinitis, unspecified type         Plan:     Sinuses improved with budesonide rinse, recommended continuing daily use.  No need for sinus CT at this time.  Produced another thick glob of green purulence with cough immediately after endonasal exam without any similar findings.  Reasonable conclusion is that this is a pulmonary process.  Suggest return evaluation with Kerry, possible chest CT.   Return if symptoms worsen or fail to improve.

## 2018-07-19 ENCOUNTER — OFFICE VISIT (OUTPATIENT)
Dept: INTERNAL MEDICINE | Facility: CLINIC | Age: 38
End: 2018-07-19
Payer: COMMERCIAL

## 2018-07-19 ENCOUNTER — LAB VISIT (OUTPATIENT)
Dept: LAB | Facility: HOSPITAL | Age: 38
End: 2018-07-19
Attending: INTERNAL MEDICINE
Payer: COMMERCIAL

## 2018-07-19 VITALS
WEIGHT: 242.75 LBS | BODY MASS INDEX: 29.56 KG/M2 | OXYGEN SATURATION: 96 % | SYSTOLIC BLOOD PRESSURE: 128 MMHG | DIASTOLIC BLOOD PRESSURE: 88 MMHG | HEART RATE: 85 BPM | HEIGHT: 76 IN

## 2018-07-19 DIAGNOSIS — J31.0 CHRONIC RHINITIS: ICD-10-CM

## 2018-07-19 DIAGNOSIS — Z13.220 LIPID SCREENING: ICD-10-CM

## 2018-07-19 DIAGNOSIS — R06.83 SNORING: ICD-10-CM

## 2018-07-19 DIAGNOSIS — R05.3 COUGH, PERSISTENT: ICD-10-CM

## 2018-07-19 DIAGNOSIS — Z00.00 ANNUAL PHYSICAL EXAM: Primary | ICD-10-CM

## 2018-07-19 DIAGNOSIS — Z13.1 SCREENING FOR DIABETES MELLITUS: ICD-10-CM

## 2018-07-19 DIAGNOSIS — F32.9 MAJOR DEPRESSION, CHRONIC: ICD-10-CM

## 2018-07-19 DIAGNOSIS — R53.82 CHRONIC FATIGUE: ICD-10-CM

## 2018-07-19 LAB
ALBUMIN SERPL BCP-MCNC: 4.2 G/DL
ALP SERPL-CCNC: 68 U/L
ALT SERPL W/O P-5'-P-CCNC: 37 U/L
ANION GAP SERPL CALC-SCNC: 9 MMOL/L
AST SERPL-CCNC: 29 U/L
BASOPHILS # BLD AUTO: 0.02 K/UL
BASOPHILS NFR BLD: 0.5 %
BILIRUB SERPL-MCNC: 1.5 MG/DL
BUN SERPL-MCNC: 13 MG/DL
CALCIUM SERPL-MCNC: 9.2 MG/DL
CHLORIDE SERPL-SCNC: 104 MMOL/L
CHOLEST SERPL-MCNC: 232 MG/DL
CHOLEST/HDLC SERPL: 6.8 {RATIO}
CO2 SERPL-SCNC: 26 MMOL/L
CREAT SERPL-MCNC: 1.1 MG/DL
DIFFERENTIAL METHOD: ABNORMAL
EOSINOPHIL # BLD AUTO: 0.1 K/UL
EOSINOPHIL NFR BLD: 2.7 %
ERYTHROCYTE [DISTWIDTH] IN BLOOD BY AUTOMATED COUNT: 13.6 %
EST. GFR  (AFRICAN AMERICAN): >60 ML/MIN/1.73 M^2
EST. GFR  (NON AFRICAN AMERICAN): >60 ML/MIN/1.73 M^2
ESTIMATED AVG GLUCOSE: 97 MG/DL
FOLATE SERPL-MCNC: 14.5 NG/ML
GLUCOSE SERPL-MCNC: 92 MG/DL
HBA1C MFR BLD HPLC: 5 %
HCT VFR BLD AUTO: 43.9 %
HDLC SERPL-MCNC: 34 MG/DL
HDLC SERPL: 14.7 %
HGB BLD-MCNC: 15 G/DL
LDLC SERPL CALC-MCNC: 145 MG/DL
LYMPHOCYTES # BLD AUTO: 1 K/UL
LYMPHOCYTES NFR BLD: 24.3 %
MCH RBC QN AUTO: 29.5 PG
MCHC RBC AUTO-ENTMCNC: 34.2 G/DL
MCV RBC AUTO: 86 FL
MONOCYTES # BLD AUTO: 0.5 K/UL
MONOCYTES NFR BLD: 11.4 %
NEUTROPHILS # BLD AUTO: 2.5 K/UL
NEUTROPHILS NFR BLD: 60.9 %
NONHDLC SERPL-MCNC: 198 MG/DL
PLATELET # BLD AUTO: 142 K/UL
PMV BLD AUTO: 10.9 FL
POTASSIUM SERPL-SCNC: 3.9 MMOL/L
PROT SERPL-MCNC: 7.5 G/DL
RBC # BLD AUTO: 5.08 M/UL
SODIUM SERPL-SCNC: 139 MMOL/L
T4 FREE SERPL-MCNC: 1.04 NG/DL
TRIGL SERPL-MCNC: 265 MG/DL
TSH SERPL DL<=0.005 MIU/L-ACNC: 1.23 UIU/ML
VIT B12 SERPL-MCNC: 525 PG/ML
WBC # BLD AUTO: 4.03 K/UL

## 2018-07-19 PROCEDURE — 99395 PREV VISIT EST AGE 18-39: CPT | Mod: S$GLB,,, | Performed by: INTERNAL MEDICINE

## 2018-07-19 PROCEDURE — 80061 LIPID PANEL: CPT

## 2018-07-19 PROCEDURE — 80053 COMPREHEN METABOLIC PANEL: CPT

## 2018-07-19 PROCEDURE — 36415 COLL VENOUS BLD VENIPUNCTURE: CPT

## 2018-07-19 PROCEDURE — 99999 PR PBB SHADOW E&M-EST. PATIENT-LVL III: CPT | Mod: PBBFAC,,, | Performed by: INTERNAL MEDICINE

## 2018-07-19 PROCEDURE — 84443 ASSAY THYROID STIM HORMONE: CPT

## 2018-07-19 PROCEDURE — 84439 ASSAY OF FREE THYROXINE: CPT

## 2018-07-19 PROCEDURE — 85025 COMPLETE CBC W/AUTO DIFF WBC: CPT

## 2018-07-19 PROCEDURE — 83036 HEMOGLOBIN GLYCOSYLATED A1C: CPT

## 2018-07-19 PROCEDURE — 82746 ASSAY OF FOLIC ACID SERUM: CPT

## 2018-07-19 PROCEDURE — 82607 VITAMIN B-12: CPT

## 2018-07-19 NOTE — PROGRESS NOTES
INTERNAL MEDICINE ESTABLISHED PATIENT VISIT NOTE    Subjective:     Chief Complaint: Annual Exam       Patient ID: Juan José Ng is a 37 y.o. male with chronic rhinitis (see last note for details, has had sinus surgery in the past and followed by ID, allergy, and pulm in the past c neg immunodeficiency w/u, normal PFTs, normal CT sinuses/scope), depression (failed tx c cymbalta, lexapro, zoloft 2/2 inefficacy or s/e), last seen by me a year ago, here today for annual exam.    Regarding depression, was seen by psychiatry and psychologist in Sept who rec individual psychotherapy.    Says his wife put him on Rafy-E and L-methyl folate and B6 and D3 vitamins which helped a lot.    Regarding ENT issues, was last seen by Dr. Herrera in Dec for repeat endoscopy which showed sinuses improved c Budesonide rinse and was rec to cont daily use.  Had cough c purulence p exam and was advised to f/u c Dr. Payne for possible chest CT (of note, was seen by Dr. Payne last year who suspected issues mostly sinus related but considered CT chest).    Past Medical History:  Past Medical History:   Diagnosis Date    Allergy     Anxiety     Depression     Hx of psychiatric care     Major depression, chronic 7/6/2017    Psychiatric problem     Recurrent upper respiratory infection (URI)     Therapy        Home Medications:  Prior to Admission medications    Medication Sig Start Date End Date Taking? Authorizing Provider   budesonide (PULMICORT) 0.5 mg/2 mL nebulizer solution Take 2 mLs (0.5 mg total) by nebulization 2 (two) times daily. For use in saline irrigation as directed. 11/10/17 12/10/17  Freddy Herrera MD       Allergies:  Review of patient's allergies indicates:  No Known Allergies    Social History:  Social History   Substance Use Topics    Smoking status: Never Smoker    Smokeless tobacco: Never Used    Alcohol use No        Review of Systems   Constitutional: Negative for activity change and unexpected weight  "change.   HENT: Positive for rhinorrhea. Negative for hearing loss and trouble swallowing.    Eyes: Negative for discharge and visual disturbance.   Respiratory: Negative for chest tightness and wheezing.    Cardiovascular: Negative for chest pain and palpitations.   Gastrointestinal: Negative for blood in stool, constipation, diarrhea and vomiting.   Endocrine: Negative for polydipsia and polyuria.   Genitourinary: Negative for difficulty urinating, hematuria and urgency.   Musculoskeletal: Negative for arthralgias, joint swelling and neck pain.   Neurological: Negative for weakness and headaches.   Psychiatric/Behavioral: Positive for dysphoric mood. Negative for confusion.         Health Maintenance:     Immunizations:   Influenza is not up to date, rec this Fall  TDap 6/2017  Pneumovax is up to date. 2015 per pt     Cancer Screening:  N/a    Objective:   /88   Pulse 85   Ht 6' 4" (1.93 m)   Wt 110.1 kg (242 lb 11.6 oz)   SpO2 96%   BMI 29.55 kg/m²        General: AAO x3, no apparent distress  HEENT: op clear, slight fluid behind TM R>L  CV: RRR, no m/r/g  Pulm: Lungs CTAB, no crackles, no wheezes  Abd: s/NT/ND +BS  Extremities: no c/c/e    Labs:         Assessment/Plan     Juan José was seen today for annual exam.    Diagnoses and all orders for this visit:    Annual physical exam  History and physical exam completed.  Health maintenance reviewed as above.    Cough, persistent  As per HPI  PFTs normal in the past  Did not tolerate flonase but has been using budesonide rinses  Cont f/u c ENT  -     CT Chest Without Contrast; Future    Chronic rhinitis  As per HPI  Complex hx, poor subjective control of sx although imaging and scope have been fairly stable.  Cont f/u c ENT    Major depression, chronic  As per HPI  Advised to consider tx as rx'ed by psych, never took prozac, advised to f/u since depressive sx have returned.  No si/hi    Chronic fatigue  Will check basic labs and sleep study  -     CBC auto " differential; Future  -     Comprehensive metabolic panel; Future  -     TSH; Future  -     T4, free; Future  -     Vitamin B12; Future  -     Folate; Future  -     Polysomnogram (CPAP will be added if patient meets diagnostic criteria.); Future    Snoring  -     Polysomnogram (CPAP will be added if patient meets diagnostic criteria.); Future    Lipid screening  -     Lipid panel; Future    Screening for diabetes mellitus  -     Hemoglobin A1c; Future    HM as above  RTC in 6 mos for f/u, sooner if needed depending on labs and sx.    Lina Silva MD  Department of Internal Medicine - [facility]  07/19/2018

## 2018-07-21 ENCOUNTER — HOSPITAL ENCOUNTER (OUTPATIENT)
Dept: RADIOLOGY | Facility: HOSPITAL | Age: 38
Discharge: HOME | End: 2018-07-21
Attending: INTERNAL MEDICINE
Payer: COMMERCIAL

## 2018-07-21 DIAGNOSIS — R05.3 COUGH, PERSISTENT: ICD-10-CM

## 2018-07-21 PROCEDURE — 71250 CT THORAX DX C-: CPT | Mod: TC

## 2018-07-21 PROCEDURE — 71250 CT THORAX DX C-: CPT | Mod: 26,,, | Performed by: RADIOLOGY

## 2018-07-24 ENCOUNTER — TELEPHONE (OUTPATIENT)
Dept: INTERNAL MEDICINE | Facility: CLINIC | Age: 38
End: 2018-07-24

## 2018-07-24 DIAGNOSIS — D69.6 THROMBOCYTOPENIA: Primary | ICD-10-CM

## 2018-07-24 DIAGNOSIS — R17 SERUM TOTAL BILIRUBIN ELEVATED: ICD-10-CM

## 2018-07-24 NOTE — TELEPHONE ENCOUNTER
----- Message from Valeria Gonzalez sent at 7/24/2018 10:39 AM CDT -----  Contact: self/ [phone]  Patient would like to get test results    Name of test (lab, mammo, etc.):   Lab, c/scan    Date of test:  7/19 7/21    Ordering provider: Dr. Silva    Where was the test performed:      Would you prefer a response via Access[employer]?:  no    Comments:

## 2018-07-25 ENCOUNTER — TELEPHONE (OUTPATIENT)
Dept: INTERNAL MEDICINE | Facility: CLINIC | Age: 38
End: 2018-07-25

## 2018-07-25 ENCOUNTER — HOSPITAL ENCOUNTER (OUTPATIENT)
Dept: RADIOLOGY | Facility: HOSPITAL | Age: 38
Discharge: HOME | End: 2018-07-25
Attending: INTERNAL MEDICINE
Payer: COMMERCIAL

## 2018-07-25 DIAGNOSIS — R17 SERUM TOTAL BILIRUBIN ELEVATED: ICD-10-CM

## 2018-07-25 PROCEDURE — 76700 US EXAM ABDOM COMPLETE: CPT | Mod: TC

## 2018-07-25 PROCEDURE — 76700 US EXAM ABDOM COMPLETE: CPT | Mod: 26,,, | Performed by: RADIOLOGY

## 2018-07-25 NOTE — TELEPHONE ENCOUNTER
----- Message from Celia Rudolph sent at 7/25/2018  1:41 PM CDT -----  Contact: self/[phone]  Patient is returning a phone call.  Who left a message for the patient: Elizabeth HARRIS  Does patient know what this is regarding:  no  Comments: thank you

## 2018-08-01 ENCOUNTER — TELEPHONE (OUTPATIENT)
Dept: INTERNAL MEDICINE | Facility: CLINIC | Age: 38
End: 2018-08-01

## 2018-08-01 DIAGNOSIS — R53.82 CHRONIC FATIGUE: Primary | ICD-10-CM

## 2018-08-01 NOTE — TELEPHONE ENCOUNTER
----- Message from Akiko Terry sent at 8/1/2018 12:09 PM CDT -----  Contact: self [phone]  Type: Returning a call    Who left a message? Elizabeth    When did the practice call? today    Comments: Please call patient back

## 2018-08-01 NOTE — TELEPHONE ENCOUNTER
Spoke with pt to let him know his ins dont cover a regular sleep study, they will cover a home study

## 2018-08-08 ENCOUNTER — PATIENT MESSAGE (OUTPATIENT)
Dept: PULMONOLOGY | Facility: CLINIC | Age: 38
End: 2018-08-08

## 2018-08-15 ENCOUNTER — OFFICE VISIT (OUTPATIENT)
Dept: PULMONOLOGY | Facility: CLINIC | Age: 38
End: 2018-08-15
Payer: COMMERCIAL

## 2018-08-15 VITALS
WEIGHT: 245.56 LBS | BODY MASS INDEX: 29.9 KG/M2 | HEIGHT: 76 IN | DIASTOLIC BLOOD PRESSURE: 78 MMHG | SYSTOLIC BLOOD PRESSURE: 106 MMHG | OXYGEN SATURATION: 97 % | HEART RATE: 69 BPM

## 2018-08-15 DIAGNOSIS — A31.0 MYCOBACTERIUM AVIUM-INTRACELLULARE COMPLEX: Primary | ICD-10-CM

## 2018-08-15 PROCEDURE — 99999 PR PBB SHADOW E&M-EST. PATIENT-LVL II: CPT | Mod: PBBFAC,,, | Performed by: INTERNAL MEDICINE

## 2018-08-15 PROCEDURE — 3008F BODY MASS INDEX DOCD: CPT | Mod: CPTII,S$GLB,, | Performed by: INTERNAL MEDICINE

## 2018-08-15 PROCEDURE — 99214 OFFICE O/P EST MOD 30 MIN: CPT | Mod: S$GLB,,, | Performed by: INTERNAL MEDICINE

## 2018-08-15 NOTE — PROGRESS NOTES
"Subjective:       Patient ID: Juan José Ng is a 37 y.o. male.    Chief Complaint: Cough    36 year old female with hx of chronic sinusitis and sinus surgeries. Patient states that his symptoms started when he was 14 years old. He has had rhinitis and nasal drainage for many years. He feels no allergy medicines have improved his symptoms. He follows with Dr. Ring, ENT at [facility]. He has not had insurance until recently and so he hasn't had appropriate follow up. He has had a negative allergy and immunodeficiency work up. He continues to have nasal drainage and yellowish sputum. His private ENT suggested he come to [facility] for bronchoscopy. He has been on multiple courses of antibiotics. Denies fever, chills or unintentional weight loss. His sinus issues have caused him significant depression and debility.     Interval hx: 37 year old male with continued cough and mucous production. Patient has since seen his ENT who felt sinuses are not his current issue. CT chest ordered which showed multiple non specific infiltrates.   Denies fever, chills and night sweats.   Patient has recently performed Abx rinses and steroid rinses.         Review of Systems   Constitutional: Negative for activity change and appetite change.   HENT: Negative for postnasal drip and congestion.    Respiratory: Positive for cough, sputum production, shortness of breath and dyspnea on extertion.    Endocrine: Negative for cold intolerance and heat intolerance.    Musculoskeletal: Negative for arthralgias and back pain.   Skin: Negative for rash.   Gastrointestinal: Negative for abdominal distention.   Neurological: Negative for dizziness.   Hematological: Negative for adenopathy.   Psychiatric/Behavioral: Negative for confusion.       Objective:       Vitals:    08/15/18 1414   BP: 106/78   Pulse: 69   SpO2: 97%   Weight: 111.4 kg (245 lb 9.5 oz)   Height: 6' 4" (1.93 m)       Physical Exam   Constitutional: He is oriented to person, place, " and time. He appears well-developed and well-nourished. No distress.   HENT:   Head: Normocephalic.   Nose: Nose normal.   Neck: Normal range of motion. Neck supple.   Cardiovascular: Normal rate and regular rhythm.   Pulmonary/Chest: Normal expansion and symmetric chest wall expansion. He has no wheezes. He has no rales.   Abdominal: Soft. Bowel sounds are normal.   Musculoskeletal: Normal range of motion. He exhibits no edema.   Neurological: He is alert and oriented to person, place, and time.   Skin: He is not diaphoretic.   Psychiatric: He has a normal mood and affect. His behavior is normal.   Nursing note and vitals reviewed.    Personal Diagnostic Review  CT of chest performed 1. Multiple regions in both lungs show findings consistent with airway infection/inflammation, notably right middle and lower lobes.  Correlate clinically for active infection such as bronchitis/bronchopneumonia.  Suggest CT follow-up in 3 months.  No flowsheet data found.      Assessment:       1. Mycobacterium avium-intracellulare complex        No outpatient encounter medications on file as of 8/15/2018.     No facility-administered encounter medications on file as of 8/15/2018.      Orders Placed This Encounter   Procedures    AFB Culture & Smear     Standing Status:   Future     Standing Expiration Date:   10/14/2019    AFB Culture & Smear     Standing Status:   Future     Standing Expiration Date:   10/14/2019    AFB Culture & Smear     Standing Status:   Future     Standing Expiration Date:   10/14/2019     Plan:     37 year old female with     1)Cough and Congestion- Patient continues to have similar symptoms. These have been present for years. No sign of acute infection.    -Continue symptomatic treatment.   -Trial of PPI. Consideration for GERD as a cause.     2)Abnormal CT chest- Multiple non specific infiltrates. Concern for indolent infection such as MAC.  -Sputum cultures sent.     Consideration for bronchoscopy if no  improvement.     Rasta Payne MD

## 2018-08-16 ENCOUNTER — LAB VISIT (OUTPATIENT)
Dept: LAB | Facility: HOSPITAL | Age: 38
End: 2018-08-16
Attending: INTERNAL MEDICINE
Payer: COMMERCIAL

## 2018-08-16 DIAGNOSIS — A31.0 MYCOBACTERIUM AVIUM-INTRACELLULARE COMPLEX: ICD-10-CM

## 2018-08-16 PROCEDURE — 87015 SPECIMEN INFECT AGNT CONCNTJ: CPT

## 2018-08-16 PROCEDURE — 87116 MYCOBACTERIA CULTURE: CPT

## 2018-08-16 PROCEDURE — 87206 SMEAR FLUORESCENT/ACID STAI: CPT

## 2018-08-23 ENCOUNTER — LAB VISIT (OUTPATIENT)
Dept: LAB | Facility: HOSPITAL | Age: 38
End: 2018-08-23
Attending: INTERNAL MEDICINE
Payer: COMMERCIAL

## 2018-08-23 DIAGNOSIS — A31.0 MYCOBACTERIUM AVIUM-INTRACELLULARE COMPLEX: ICD-10-CM

## 2018-08-23 PROCEDURE — 87015 SPECIMEN INFECT AGNT CONCNTJ: CPT

## 2018-08-23 PROCEDURE — 87118 MYCOBACTERIC IDENTIFICATION: CPT

## 2018-08-23 PROCEDURE — 87116 MYCOBACTERIA CULTURE: CPT

## 2018-08-23 PROCEDURE — 87149 DNA/RNA DIRECT PROBE: CPT

## 2018-08-23 PROCEDURE — 87206 SMEAR FLUORESCENT/ACID STAI: CPT

## 2018-08-23 PROCEDURE — 87186 SC STD MICRODIL/AGAR DIL: CPT

## 2018-08-28 ENCOUNTER — TELEPHONE (OUTPATIENT)
Dept: SLEEP MEDICINE | Facility: OTHER | Age: 38
End: 2018-08-28

## 2018-08-29 ENCOUNTER — LAB VISIT (OUTPATIENT)
Dept: LAB | Facility: HOSPITAL | Age: 38
End: 2018-08-29
Attending: INTERNAL MEDICINE
Payer: COMMERCIAL

## 2018-08-29 DIAGNOSIS — A31.0 MYCOBACTERIUM AVIUM-INTRACELLULARE COMPLEX: ICD-10-CM

## 2018-08-29 PROCEDURE — 87206 SMEAR FLUORESCENT/ACID STAI: CPT

## 2018-08-29 PROCEDURE — 87015 SPECIMEN INFECT AGNT CONCNTJ: CPT

## 2018-08-29 PROCEDURE — 87116 MYCOBACTERIA CULTURE: CPT

## 2018-10-02 ENCOUNTER — TELEPHONE (OUTPATIENT)
Dept: SLEEP MEDICINE | Facility: OTHER | Age: 38
End: 2018-10-02

## 2018-10-08 ENCOUNTER — TELEPHONE (OUTPATIENT)
Dept: SLEEP MEDICINE | Facility: OTHER | Age: 38
End: 2018-10-08

## 2018-10-11 ENCOUNTER — TELEPHONE (OUTPATIENT)
Dept: SLEEP MEDICINE | Facility: OTHER | Age: 38
End: 2018-10-11

## 2018-10-11 NOTE — TELEPHONE ENCOUNTER
Called to reschedule patients home sleep study on Oct 15th to Oct 22nd.  Due to device not working.

## 2018-10-18 LAB
ACID FAST MOD KINY STN SPEC: NORMAL
MYCOBACTERIUM SPEC QL CULT: NORMAL

## 2018-10-22 ENCOUNTER — HOSPITAL ENCOUNTER (OUTPATIENT)
Dept: SLEEP MEDICINE | Facility: OTHER | Age: 38
Discharge: HOME | End: 2018-10-22
Attending: INTERNAL MEDICINE
Payer: COMMERCIAL

## 2018-10-22 DIAGNOSIS — R53.82 CHRONIC FATIGUE: ICD-10-CM

## 2018-10-22 DIAGNOSIS — G47.33 OSA (OBSTRUCTIVE SLEEP APNEA): ICD-10-CM

## 2018-10-22 PROCEDURE — 95800 SLP STDY UNATTENDED: CPT | Mod: 26,,, | Performed by: PSYCHIATRY & NEUROLOGY

## 2018-10-22 PROCEDURE — 95800 SLP STDY UNATTENDED: CPT

## 2018-10-31 ENCOUNTER — TELEPHONE (OUTPATIENT)
Dept: INTERNAL MEDICINE | Facility: CLINIC | Age: 38
End: 2018-10-31

## 2018-10-31 DIAGNOSIS — G47.33 OSA (OBSTRUCTIVE SLEEP APNEA): Primary | ICD-10-CM

## 2018-10-31 LAB
ACID FAST MOD KINY STN SPEC: NORMAL
MYCOBACTERIUM SPEC QL CULT: NORMAL

## 2018-10-31 NOTE — PROCEDURES
"Dear Referring Provider,    The sleep study that you ordered is complete. You have ordered sleep LAB services to perform the sleep study for Juan José Ng.     Please find Sleep Study result in "Chart Review" under the "Media tab."     As the ordering provider, you are responsible for reviewing the results and implementing a treatment plan with your patient. If you need a Sleep Medicine provider to explain the sleep study findings and arrange treatment for the patient, please refer patient for consultation to our Sleep Clinic via [facility] with Ambulatory Consult Sleep.    To do that please place an order for an "Ambulatory Consult Sleep" - it will go to our clinic work queue for our Medical Assistant to contact the patient for an appointment.     For any questions, please contact our clinic staff at [phone] to talk to clinical staff.      "

## 2018-11-01 LAB
ACID FAST SUSCEPTIBILITY, SLOW GROWER: ABNORMAL
SPECIMEN SOURCE AND ORGANISM NAME: ABNORMAL

## 2018-11-06 LAB
ACID FAST MOD KINY STN SPEC: NORMAL
MYCOBACTERIUM SPEC QL CULT: NORMAL

## 2018-11-13 ENCOUNTER — TELEPHONE (OUTPATIENT)
Dept: SLEEP MEDICINE | Facility: OTHER | Age: 38
End: 2018-11-13

## 2018-11-20 ENCOUNTER — TELEPHONE (OUTPATIENT)
Dept: SLEEP MEDICINE | Facility: OTHER | Age: 38
End: 2018-11-20

## 2018-11-28 ENCOUNTER — TELEPHONE (OUTPATIENT)
Dept: SLEEP MEDICINE | Facility: OTHER | Age: 38
End: 2018-11-28

## 2018-11-28 ENCOUNTER — OFFICE VISIT (OUTPATIENT)
Dept: PULMONOLOGY | Facility: CLINIC | Age: 38
End: 2018-11-28
Payer: COMMERCIAL

## 2018-11-28 VITALS
OXYGEN SATURATION: 95 % | SYSTOLIC BLOOD PRESSURE: 123 MMHG | DIASTOLIC BLOOD PRESSURE: 88 MMHG | HEIGHT: 76 IN | HEART RATE: 90 BPM | BODY MASS INDEX: 29.49 KG/M2 | WEIGHT: 242.13 LBS

## 2018-11-28 DIAGNOSIS — J18.9 PNEUMONIA OF BOTH LOWER LOBES DUE TO INFECTIOUS ORGANISM: ICD-10-CM

## 2018-11-28 DIAGNOSIS — R05.3 COUGH, PERSISTENT: ICD-10-CM

## 2018-11-28 DIAGNOSIS — R05.9 COUGH: ICD-10-CM

## 2018-11-28 PROCEDURE — 99213 OFFICE O/P EST LOW 20 MIN: CPT | Mod: S$GLB,,, | Performed by: INTERNAL MEDICINE

## 2018-11-28 PROCEDURE — 3008F BODY MASS INDEX DOCD: CPT | Mod: CPTII,S$GLB,, | Performed by: INTERNAL MEDICINE

## 2018-12-05 ENCOUNTER — TELEPHONE (OUTPATIENT)
Dept: SLEEP MEDICINE | Facility: OTHER | Age: 38
End: 2018-12-05

## 2018-12-05 PROBLEM — R05.9 COUGH: Status: ACTIVE | Noted: 2018-12-05

## 2018-12-05 NOTE — ASSESSMENT & PLAN NOTE
His cough is most likely associated with his sinus drainage. However he does have significant infiltrates on his Chest CT. Additionally, AFB + in sputum culture.   -Will reorder CT chest.   -Will recommend ID referral in changes in his CT and for need of MAC treatment.

## 2018-12-05 NOTE — PROGRESS NOTES
"Subjective:       Patient ID: Juan José Ng is a 38 y.o. male.    Chief Complaint: Results      36 year old female with hx of chronic sinusitis and sinus surgeries. Patient states that his symptoms started when he was 14 years old. He has had rhinitis and nasal drainage for many years. He feels no allergy medicines have improved his symptoms. He follows with Dr. Ring, ENT at [facility]. He has not had insurance until recently and so he hasn't had appropriate follow up. He has had a negative allergy and immunodeficiency work up. He continues to have nasal drainage and yellowish sputum. His private ENT suggested he come to [facility] for bronchoscopy. He has been on multiple courses of antibiotics. Denies fever, chills or unintentional weight loss. His sinus issues have caused him significant depression and debility.      Interval hx: 37 year old male with continued cough and mucous production. Patient has since seen his ENT who felt sinuses are not his current issue. CT chest ordered which showed multiple non specific infiltrates.   Denies fever, chills and night sweats.   Patient has recently performed Abx rinses and steroid rinses.        Interval hx: Patient returns for follow up. He continues to have mucous production. He has not needed abx for several months.   Patient had AFB + sputum(MAC).  Denies fever and chills.       Review of Systems   HENT: Positive for postnasal drip, rhinorrhea and congestion.    Respiratory: Positive for cough and sputum production.        Objective:       Vitals:    11/28/18 1517   BP: 123/88   Pulse: 90   SpO2: 95%   Weight: 109.8 kg (242 lb 1.6 oz)   Height: 6' 4" (1.93 m)   PainSc: 0-No pain       Physical Exam   Constitutional: He is oriented to person, place, and time. He appears well-developed and well-nourished. No distress.   HENT:   Head: Normocephalic.   Nose: Nose normal.   Cardiovascular: Normal rate and regular rhythm.   Pulmonary/Chest: Normal expansion and " hyperinflation.   Musculoskeletal: Normal range of motion. He exhibits no edema.   Neurological: He is alert and oriented to person, place, and time.   Skin: He is not diaphoretic.   Nursing note and vitals reviewed.    Personal Diagnostic Review  none pertinent  No flowsheet data found.      Assessment:       1. Cough, persistent    2. Pneumonia of both lower lobes due to infectious organism        No outpatient encounter medications on file as of 11/28/2018.     No facility-administered encounter medications on file as of 11/28/2018.      Orders Placed This Encounter   Procedures    CT Chest Without Contrast     Standing Status:   Future     Standing Expiration Date:   11/28/2019     Order Specific Question:   May the Radiologist modify the order per protocol to meet the clinical needs of the patient?     Answer:   Yes       Plan:     Cough  His cough is most likely associated with his sinus drainage. However he does have significant infiltrates on his Chest CT. Additionally, AFB + in sputum culture.   -Will reorder CT chest.   -Will recommend ID referral in changes in his CT and for need of MAC treatment.

## 2018-12-07 ENCOUNTER — TELEPHONE (OUTPATIENT)
Dept: SLEEP MEDICINE | Facility: OTHER | Age: 38
End: 2018-12-07

## 2019-01-02 ENCOUNTER — HOSPITAL ENCOUNTER (OUTPATIENT)
Dept: RADIOLOGY | Facility: HOSPITAL | Age: 39
Discharge: HOME | End: 2019-01-02
Attending: INTERNAL MEDICINE
Payer: COMMERCIAL

## 2019-01-02 DIAGNOSIS — J18.9 PNEUMONIA OF BOTH LOWER LOBES DUE TO INFECTIOUS ORGANISM: ICD-10-CM

## 2019-01-02 DIAGNOSIS — R05.3 COUGH, PERSISTENT: ICD-10-CM

## 2019-01-02 PROCEDURE — 71250 CT THORAX DX C-: CPT | Mod: 26,,, | Performed by: RADIOLOGY

## 2019-01-02 PROCEDURE — 71250 CT CHEST WITHOUT CONTRAST: ICD-10-PCS | Mod: 26,,, | Performed by: RADIOLOGY

## 2019-01-02 PROCEDURE — 71250 CT THORAX DX C-: CPT | Mod: TC

## 2019-01-17 ENCOUNTER — TELEPHONE (OUTPATIENT)
Dept: SLEEP MEDICINE | Facility: OTHER | Age: 39
End: 2019-01-17

## 2019-01-21 ENCOUNTER — OFFICE VISIT (OUTPATIENT)
Dept: INFECTIOUS DISEASES | Facility: CLINIC | Age: 39
End: 2019-01-21
Payer: COMMERCIAL

## 2019-01-21 VITALS
HEIGHT: 76 IN | TEMPERATURE: 99 F | HEART RATE: 88 BPM | WEIGHT: 240.94 LBS | BODY MASS INDEX: 29.34 KG/M2 | SYSTOLIC BLOOD PRESSURE: 126 MMHG | DIASTOLIC BLOOD PRESSURE: 82 MMHG

## 2019-01-21 DIAGNOSIS — J32.9 RECURRENT SINUSITIS: Primary | ICD-10-CM

## 2019-01-21 DIAGNOSIS — R05.9 COUGH: ICD-10-CM

## 2019-01-21 DIAGNOSIS — D84.9 IMMUNODEFICIENCY: ICD-10-CM

## 2019-01-21 PROCEDURE — 99205 OFFICE O/P NEW HI 60 MIN: CPT | Mod: S$GLB,,, | Performed by: INTERNAL MEDICINE

## 2019-01-21 PROCEDURE — 99999 PR PBB SHADOW E&M-EST. PATIENT-LVL III: CPT | Mod: PBBFAC,,, | Performed by: INTERNAL MEDICINE

## 2019-01-21 PROCEDURE — 3008F PR BODY MASS INDEX (BMI) DOCUMENTED: ICD-10-PCS | Mod: CPTII,S$GLB,, | Performed by: INTERNAL MEDICINE

## 2019-01-21 PROCEDURE — 99999 PR PBB SHADOW E&M-EST. PATIENT-LVL III: ICD-10-PCS | Mod: PBBFAC,,, | Performed by: INTERNAL MEDICINE

## 2019-01-21 PROCEDURE — 3008F BODY MASS INDEX DOCD: CPT | Mod: CPTII,S$GLB,, | Performed by: INTERNAL MEDICINE

## 2019-01-21 PROCEDURE — 99205 PR OFFICE/OUTPT VISIT, NEW, LEVL V, 60-74 MIN: ICD-10-PCS | Mod: S$GLB,,, | Performed by: INTERNAL MEDICINE

## 2019-01-21 NOTE — Clinical Note
Hi Dr. No, I was trying to get this patient into see you specifically if possible.  He has had recurrent sinopulmonary infections x 20 years.  His immunologic work up was unremarkable.  I am concerned he may have granulomatosis with polyangitis that has yet to be diagnoses. He states he has responded to steroids in past.  He has had many rounds of antimicrobials and found to have MAC from a expectorated sputum culture - which I can treat, but I do not think that is responsible for underlying process.  Thanks so much for your input!

## 2019-01-21 NOTE — PROGRESS NOTES
Infectious Diseases Clinic Note    Subjective:       Patient ID: Juan José Ng is a 38 y.o. male.    Chief Complaint: No chief complaint on file.    HPI    37 y/o M h/o recurrent sino-pulmonary infections x 20 years  and multiple sinus surgeries followed by ent at [facility], just recently had insurance.  Reports having negative alergy and immunodeficiency work up referred to [facility] pulm for further work up.  CT chest showed multiple non specific infiltrates.  8.23.18 sputum cultures growing MAC.  Repeat CT 1.2.19 with peripheral tree-in-bud opacities in b/l lower lung zones    PFTs normal  But pt constantly with thick drainage and fatigue, no SOB and no ETT  Works retail and tutoring      Past Medical History:   Diagnosis Date    Allergy     Anxiety     Depression     Hx of psychiatric care     Major depression, chronic 7/6/2017    Psychiatric problem     Recurrent upper respiratory infection (URI)     Therapy        Social History     Socioeconomic History    Marital status:      Spouse name: Not on file    Number of children: Not on file    Years of education: Not on file    Highest education level: Not on file   Social Needs    Financial resource strain: Not on file    Food insecurity - worry: Not on file    Food insecurity - inability: Not on file    Transportation needs - medical: Not on file    Transportation needs - non-medical: Not on file   Occupational History    Not on file   Tobacco Use    Smoking status: Never Smoker    Smokeless tobacco: Never Used   Substance and Sexual Activity    Alcohol use: No    Drug use: No    Sexual activity: Yes     Partners: Female     Comment: wife   Other Topics Concern    Patient feels they ought to cut down on drinking/drug use Not Asked    Patient annoyed by others criticizing their drinking/drug use Not Asked    Patient has felt bad or guilty about drinking/drug use Not Asked    Patient has had a drink/used drugs as an eye opener in the AM  Not Asked   Social History Narrative    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       No current outpatient medications on file.    Review of Systems   Constitutional: Negative for activity change, appetite change, chills, fatigue and fever.   HENT: Positive for congestion, postnasal drip and sinus pressure. Negative for dental problem and mouth sores.    Eyes: Negative for pain, redness and visual disturbance.   Respiratory: Negative for cough, shortness of breath and wheezing.    Cardiovascular: Negative for chest pain and leg swelling.   Gastrointestinal: Negative for abdominal distention, abdominal pain, diarrhea, nausea and vomiting.   Endocrine: Negative for polyuria.   Genitourinary: Negative for decreased urine volume, dysuria and frequency.   Musculoskeletal: Negative for joint swelling.   Skin: Negative for color change.   Allergic/Immunologic: Negative for food allergies.   Neurological: Negative for dizziness, weakness and headaches.   Hematological: Negative for adenopathy.   Psychiatric/Behavioral: Negative for agitation and confusion. The patient is not nervous/anxious.            Objective:       Vitals:    01/21/19 1325   BP: 126/82   Pulse: 88   Temp: 98.5 °F (36.9 °C)       There were no vitals filed for this visit.  Physical Exam   Constitutional: He is oriented to person, place, and time. He appears well-developed and well-nourished.   HENT:   Head: Normocephalic and atraumatic.   Mouth/Throat: Oropharynx is clear and moist.   Eyes: Conjunctivae are normal.   Neck: Neck supple.   Cardiovascular: Normal rate, regular rhythm and normal heart sounds.   No murmur heard.  Pulmonary/Chest: Effort normal and breath sounds normal. No respiratory distress. He has no wheezes.   Abdominal: Soft. Bowel sounds are normal. He exhibits no distension. There is no tenderness.   Musculoskeletal: Normal range of motion. He exhibits no edema or tenderness.    Lymphadenopathy:     He has no cervical adenopathy.   Neurological: He is alert and oriented to person, place, and time. Coordination normal.   Skin: Skin is warm and dry. No rash noted.   Psychiatric: He has a normal mood and affect. His behavior is normal.           Assessment/Plan:       No diagnosis found.    39 y/o M h/o recurrent sino-pulmonary infections x 20 years  and multiple sinus surgeries followed by ent at [facility], just recently had insurance.  Reports having negative alergy and immunodeficiency work up referred to [facility] pulm for further work up.  CT chest showed multiple non specific infiltrates.  8.23.18 sputum cultures growing MAC.  Repeat CT 1.2.19 with peripheral tree-in-bud opacities in b/l lower lung zones  - suspect MAC colonization is secondary to underlying undiagnosed lung disease and would not treat at this time as we continue to work up his underlying process  - will ask for rheumatology input to help rule out vasculitis or granulomatous polyangitis  - pt knows to reach out for infectious issues, discussed with Dr. Payne - we will consider treating this in future but more w/u needs to be done first.  Would recommend lung biopsy after rheum evaluation

## 2019-01-22 ENCOUNTER — TELEPHONE (OUTPATIENT)
Dept: SLEEP MEDICINE | Facility: OTHER | Age: 39
End: 2019-01-22

## 2019-01-23 ENCOUNTER — HOSPITAL ENCOUNTER (OUTPATIENT)
Dept: SLEEP MEDICINE | Facility: OTHER | Age: 39
Discharge: HOME | End: 2019-01-23
Attending: INTERNAL MEDICINE
Payer: COMMERCIAL

## 2019-01-23 DIAGNOSIS — G47.33 OSA (OBSTRUCTIVE SLEEP APNEA): ICD-10-CM

## 2019-01-23 PROCEDURE — 95811 POLYSOM 6/>YRS CPAP 4/> PARM: CPT | Mod: 52

## 2019-01-23 PROCEDURE — 95811 PR POLYSOMNOGRAPHY W/CPAP: ICD-10-PCS | Mod: 26,,, | Performed by: PSYCHIATRY & NEUROLOGY

## 2019-01-23 PROCEDURE — 95811 POLYSOM 6/>YRS CPAP 4/> PARM: CPT | Mod: 26,,, | Performed by: PSYCHIATRY & NEUROLOGY

## 2019-01-24 NOTE — PROGRESS NOTES
"  Juan José Ng to [facility] for an overnight sleep study on 01/23/2019.   Pt education,setup and cpap explanation given prior to study.     Cpap with heated humidity, cflex and a large Simplus full mask.  Pt request full mask due to complaint of nasal congestion and mouth breathing.  Pressures explored from 4 to 10 cm H2O.   0104-Pt woke with report of anxiety and c/o claustrophobia.  Break from study given.  Tech to room x3 to check patient. Nasal mask not an option. 0205- cpap attempted again without success. 0205 Lights on. Incomplete titration.      EKG- Lead 2 appears with NSR.  Low saturation observed 92%.      Pt reports unable to complete study.  "maybe I could try again another time".      Post study information given 0220.  Pt request to leave lab.  "

## 2019-01-31 NOTE — PROGRESS NOTES
RHEUMATOLOGY CLINIC INITIAL VISIT    Reason for referral:-  Referred by Dr. Aragon for evaluation of evaluation vasculitis/granulomatous polyangitis     Chief complaints:- Recurrent Chronic Sinusitis      HPI:-  Juan José Bartlett a 38 y.o. 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 Ring ENT at [facility]), negative allergy and immunodeficiency workup was referred by ID for evaluation of vasculitis/granulomatous polyangitis.    Followed up with Dr. Herrera (ENT - 12/2017) - scope performed and did not show any abnormalities.  Recommended budesonide rinse which did not provide any alleviation in symptoms.  Referred to Pulmonary (Dr. Payne) for possible pulmonary etiology. Aug 2018 - sputum culture was +MAC.  Repeat CT (Jan 2019) showed peripheral tree-in-bud opacities of bilateral lower lung zone - suspect MAC colonization secondary to undiagnosed lung disease.  ID referral for management and treatment of MAC.  At this time, ID would like rheumatology input for any autoimmune processes prior to initiation of abx treatment.      Lived in [place] till 1994. Doing well until 1995 with recurrent nasal discharge (clear) and was persistent.  Worsen and progressive throughout the years.  Now it's always dark thick yellow discharge.  Not associated with seasons, locations, environmental factors.  Per patient, infectious disease and viral workup are negative.  Associated symptoms (now) is fatigue, weakness, lymphadenopathy (cervical and occipital).  Denies fever/chill, N/V, abdominal pain, arthralgia, myalgia, rashes, SOB, CP, urinary/bowel symptoms, Sicca symptoms, Raynaud's, unintentional weight loss, night sweats, dysphagia.     +occasional mouth ulcers?     Family history of autoimmune disease - thyroid issues (mother).     Review of Systems   Constitutional: Positive for malaise/fatigue. Negative for  chills, diaphoresis, fever and weight loss.   HENT: Positive for congestion and sinus pain. Negative for ear discharge, ear pain, hearing loss, nosebleeds and tinnitus.    Eyes: Negative for photophobia, pain, discharge and redness.   Respiratory: Positive for cough. Negative for hemoptysis, sputum production, shortness of breath, wheezing and stridor.    Cardiovascular: Negative for chest pain, palpitations, orthopnea, claudication, leg swelling and PND.   Gastrointestinal: Negative for abdominal pain, constipation, diarrhea, heartburn, nausea and vomiting.   Genitourinary: Negative for dysuria, frequency, hematuria and urgency.   Musculoskeletal: Negative for back pain, joint pain, myalgias and neck pain.   Skin: Negative for rash.   Neurological: Positive for weakness. Negative for dizziness, tingling, tremors and headaches.   Endo/Heme/Allergies: Does not bruise/bleed easily.   Psychiatric/Behavioral: Negative for depression, hallucinations and suicidal ideas. The patient is not nervous/anxious and does not have insomnia.        Past Medical History:   Diagnosis Date    Allergy     Anxiety     Depression      of psychiatric care     Major depression, chronic 7/6/2017    Psychiatric problem     Recurrent upper respiratory infection (URI)     Therapy        Past Surgical History:   Procedure Laterality Date    APPENDECTOMY      SINUS SURGERY          Social History     Tobacco Use    Smoking status: Never Smoker    Smokeless tobacco: Never Used   Substance Use Topics    Alcohol use: No    Drug use: No       Family History   Problem Relation Age of Onset    Diverticulitis Mother     Hypertension Father     Diabetes Paternal Aunt     Diabetes Paternal Uncle     Cancer Cousin         some type of intestinal cancer    Depression Brother        Review of patient's allergies indicates:  No Known Allergies        Physical examination:-    Vitals:    02/01/19 1110   BP: 133/89   Pulse: 80   Weight:  "112.6 kg (248 lb 3.8 oz)   Height: 6' 4" (1.93 m)   PainSc: 0-No pain       Physical Exam   Constitutional: He is oriented to person, place, and time and well-developed, well-nourished, and in no distress.   HENT:   Head: Normocephalic and atraumatic.   No polyps  No mouth ulcers  Normal salivary pooling   Eyes: EOM are normal. Pupils are equal, round, and reactive to light.   Neck: Normal range of motion. Neck supple.   Bilateral cervical lymphadenopathy    Cardiovascular: Normal rate, regular rhythm and normal heart sounds.   Pulmonary/Chest: Effort normal and breath sounds normal.   Abdominal: Soft. Bowel sounds are normal.   Musculoskeletal: Normal range of motion.   ROM intact in all joints  Motor and strength intact in all major joints  No signs of synovitis    Neurological: He is alert and oriented to person, place, and time. Gait normal. GCS score is 15.   Skin: Skin is warm and dry. No rash noted. No erythema.   No rashes   Psychiatric: Mood, memory, affect and judgment normal.       Labs:-  Results for JESSE WOLFE (MRN [id number]) as of 1/31/2019 15:00   Ref. Range 8/23/2018 12:35 8/23/2018 12:35 8/23/2018 12:35 8/29/2018 10:39 1/2/2019 15:15   Acid Fast Susceptibility, Slow Grower Unknown FINAL 11/01/2018 ... (A)       AFB Culture & Smear Unknown MYCOBACTERIUM IGOR... 09/06/2018  15:02 Results called to... No growth after 8...    AFB CULTURE STAIN Unknown No acid fast baci...   No acid fast baci...    Specimen Source and Organism Name Unknown sputum - MAC       Results for JESSE WOLFE (MRN [id number]) as of 1/31/2019 15:00   Ref. Range 7/19/2018 09:38   WBC Latest Ref Range: 3.90 - 12.70 K/uL 4.03   RBC Latest Ref Range: 4.60 - 6.20 M/uL 5.08   Hemoglobin Latest Ref Range: 14.0 - 18.0 g/dL 15.0   Hematocrit Latest Ref Range: 40.0 - 54.0 % 43.9   MCV Latest Ref Range: 82 - 98 fL 86   MCH Latest Ref Range: 27.0 - 31.0 pg 29.5   MCHC Latest Ref Range: 32.0 - 36.0 g/dL 34.2   RDW Latest Ref Range: 11.5 " - 14.5 % 13.6   Platelets Latest Ref Range: 150 - 350 K/uL 142 (L)   MPV Latest Ref Range: 9.2 - 12.9 fL 10.9   Gran% Latest Ref Range: 38.0 - 73.0 % 60.9   Gran # (ANC) Latest Ref Range: 1.8 - 7.7 K/uL 2.5   Lymph% Latest Ref Range: 18.0 - 48.0 % 24.3   Lymph # Latest Ref Range: 1.0 - 4.8 K/uL 1.0   Mono% Latest Ref Range: 4.0 - 15.0 % 11.4   Mono # Latest Ref Range: 0.3 - 1.0 K/uL 0.5   Eosinophil% Latest Ref Range: 0.0 - 8.0 % 2.7   Eos # Latest Ref Range: 0.0 - 0.5 K/uL 0.1   Basophil% Latest Ref Range: 0.0 - 1.9 % 0.5   Baso # Latest Ref Range: 0.00 - 0.20 K/uL 0.02   Differential Method Unknown Automated   Folate Latest Ref Range: 4.0 - 24.0 ng/mL 14.5   Vitamin B-12 Latest Ref Range: 210 - 950 pg/mL 525   Results for JESSE WOLFE (MRN [id number]) as of 1/31/2019 15:00   Ref. Range 7/19/2018 09:38   Sodium Latest Ref Range: 136 - 145 mmol/L 139   Potassium Latest Ref Range: 3.5 - 5.1 mmol/L 3.9   Chloride Latest Ref Range: 95 - 110 mmol/L 104   CO2 Latest Ref Range: 23 - 29 mmol/L 26   Anion Gap Latest Ref Range: 8 - 16 mmol/L 9   BUN, Bld Latest Ref Range: 6 - 20 mg/dL 13   Creatinine Latest Ref Range: 0.5 - 1.4 mg/dL 1.1   eGFR if non African American Latest Ref Range: >60 mL/min/1.73 m^2 >60   eGFR if  Latest Ref Range: >60 mL/min/1.73 m^2 >60   Glucose Latest Ref Range: 70 - 110 mg/dL 92   Calcium Latest Ref Range: 8.7 - 10.5 mg/dL 9.2   Alkaline Phosphatase Latest Ref Range: 55 - 135 U/L 68   Total Protein Latest Ref Range: 6.0 - 8.4 g/dL 7.5   Albumin Latest Ref Range: 3.5 - 5.2 g/dL 4.2   Total Bilirubin Latest Ref Range: 0.1 - 1.0 mg/dL 1.5 (H)   AST Latest Ref Range: 10 - 40 U/L 29   ALT Latest Ref Range: 10 - 44 U/L 37   Triglycerides Latest Ref Range: 30 - 150 mg/dL 265 (H)   Cholesterol Latest Ref Range: 120 - 199 mg/dL 232 (H)   HDL Latest Ref Range: 40 - 75 mg/dL 34 (L)   HDL/Chol Ratio Latest Ref Range: 20.0 - 50.0 % 14.7 (L)   LDL Cholesterol Latest Ref Range: 63.0 - 159.0  mg/dL 145.0   Non-HDL Cholesterol Latest Units: mg/dL 198   Total Cholesterol/HDL Ratio Latest Ref Range: 2.0 - 5.0  6.8 (H)   Vitamin B-12 Latest Ref Range: 210 - 950 pg/mL 525   Hemoglobin A1C External Latest Ref Range: 4.0 - 5.6 % 5.0   Estimated Avg Glucose Latest Ref Range: 68 - 131 mg/dL 97   TSH Latest Ref Range: 0.400 - 4.000 uIU/mL 1.232   Free T4 Latest Ref Range: 0.71 - 1.51 ng/dL 1.04   Results for JESSE WOLFE (MRN [id number]) as of 1/31/2019 15:00   Ref. Range 10/28/2015 10:04 7/25/2018 11:44   A. fumigatus Class Unknown CLASS 0    Altern. alternata Class Unknown CLASS 0    Alternaria alternata Latest Ref Range: <0.35 kU/L <0.35    Aspergillus Fumigatus IgE Latest Ref Range: <0.35 kU/L <0.35    Chaetomium Latest Ref Range: <0.35 kU/L <0.35    Chaetomium Glob. Class Unknown CLASS 0    Cladosporium Class Unknown CLASS 0    Cladosporium, IgE Latest Ref Range: <0.35 kU/L <0.35    Curvularia lunata Latest Ref Range: <0.35 kU/L <0.35    Curvularia Lunata Class Unknown CLASS 0    Epicoccum pupurascens Class Unknown CLASS 0    Epicoccum purpurascens, IgE Latest Ref Range: <0.35 kU/L <0.35    Helminthosporium Class Unknown CLASS 0    Helminthosporium Halodes IgE Latest Ref Range: <0.35 kU/L <0.35    KASIE Screen Latest Ref Range: Negative <1:160   Negative <1:160   IgG - Serum Latest Ref Range: 650 - 1600 mg/dL 1088    IgA Latest Ref Range: 40 - 350 mg/dL 233    IgE Latest Ref Range: 0 - 100 IU/mL <35        Radiographs:-  Independent visualization of images done.   CT chest without contrast  (Nov 2018)  Parabronchial cuffing, peripheral tree-in-bud opacities in bilateral lower lung zones.  Scattered bronchi with soft tissue opacities within several lower lung zone subsegmental branches, as above.  Scattered centrilobular ground-glass densities in bilateral lower lung zones.  Findings are overall not significantly changed when compared to prior exam and may represent an infectious or non infectious  inflammatory etiology such as granulomatosis with polyangiitis or eosinophilic granulomatosis with polyangiitis.    Left thyroid lobe subcentimeter hypodensity, stable since prior exam.  If further evaluation is clinically warranted we recommend thyroid ultrasound    Assessment/Plans:-  38 y.o. 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 Ring ENT at [facility]), negative allergy and immunodeficiency workup was referred by ID for evaluation of vasculitis/granulomatous polyangitis.    Followed up with Dr. Herrera (ENT - 12/2017) - scope performed and did not show any abnormalities.  Recommended budesonide rinse which did not provide any alleviation in symptoms.  Referred to Pulmonary (Dr. Payne) for possible pulmonary etiology. Aug 2018 - sputum culture was +MAC.  Repeat CT (Jan 2019) showed peripheral tree-in-bud opacities of bilateral lower lung zone - suspect MAC colonization secondary to undiagnosed lung disease.  ID referral for management and treatment of MAC.  At this time, ID would like rheumatology input for any autoimmune processes prior to initiation of abx treatment.      PE/ROS unremarkable for any autoimmune processes at this time.  Labs are remarkable for chronic thrombocytopenia (dating back to 2015).  KASIE negative.    Plan  - ANCA, pr3, MPO  - Scl   - Immunoglobulins and subclasses  - Myomarker panel  - UA, Up/c, ESR/CRP, CBC, CMP  -  ACE level  - Referral to genetics - ciliary dysfunction?           # RTC in 6 weeks    Thank you Dr. Aragon  for allowing me to participate in the care ofJuan José Omar Ng.

## 2019-02-01 ENCOUNTER — LAB VISIT (OUTPATIENT)
Dept: LAB | Facility: HOSPITAL | Age: 39
End: 2019-02-01
Payer: COMMERCIAL

## 2019-02-01 ENCOUNTER — INITIAL CONSULT (OUTPATIENT)
Dept: RHEUMATOLOGY | Facility: CLINIC | Age: 39
End: 2019-02-01
Payer: COMMERCIAL

## 2019-02-01 ENCOUNTER — TELEPHONE (OUTPATIENT)
Dept: RHEUMATOLOGY | Facility: HOSPITAL | Age: 39
End: 2019-02-01

## 2019-02-01 VITALS
HEART RATE: 80 BPM | BODY MASS INDEX: 30.23 KG/M2 | SYSTOLIC BLOOD PRESSURE: 133 MMHG | DIASTOLIC BLOOD PRESSURE: 89 MMHG | WEIGHT: 248.25 LBS | HEIGHT: 76 IN

## 2019-02-01 DIAGNOSIS — J32.9 RECURRENT SINUSITIS: ICD-10-CM

## 2019-02-01 DIAGNOSIS — F32.9 MAJOR DEPRESSION, CHRONIC: ICD-10-CM

## 2019-02-01 DIAGNOSIS — J32.9 RECURRENT SINUS INFECTIONS: Primary | ICD-10-CM

## 2019-02-01 DIAGNOSIS — J32.9 RECURRENT SINUS INFECTIONS: ICD-10-CM

## 2019-02-01 DIAGNOSIS — J32.9 SINUSITIS, UNSPECIFIED CHRONICITY, UNSPECIFIED LOCATION: Primary | ICD-10-CM

## 2019-02-01 LAB
ALBUMIN SERPL BCP-MCNC: 4 G/DL
ALP SERPL-CCNC: 72 U/L
ALT SERPL W/O P-5'-P-CCNC: 28 U/L
ANION GAP SERPL CALC-SCNC: 6 MMOL/L
AST SERPL-CCNC: 26 U/L
BASOPHILS # BLD AUTO: 0.03 K/UL
BASOPHILS NFR BLD: 0.7 %
BILIRUB SERPL-MCNC: 1.3 MG/DL
BUN SERPL-MCNC: 9 MG/DL
CALCIUM SERPL-MCNC: 9.5 MG/DL
CHLORIDE SERPL-SCNC: 102 MMOL/L
CO2 SERPL-SCNC: 31 MMOL/L
CREAT SERPL-MCNC: 1 MG/DL
CRP SERPL-MCNC: 5.1 MG/L
DIFFERENTIAL METHOD: ABNORMAL
EOSINOPHIL # BLD AUTO: 0.1 K/UL
EOSINOPHIL NFR BLD: 2.6 %
ERYTHROCYTE [DISTWIDTH] IN BLOOD BY AUTOMATED COUNT: 13.4 %
ERYTHROCYTE [SEDIMENTATION RATE] IN BLOOD BY WESTERGREN METHOD: 4 MM/HR
EST. GFR  (AFRICAN AMERICAN): >60 ML/MIN/1.73 M^2
EST. GFR  (NON AFRICAN AMERICAN): >60 ML/MIN/1.73 M^2
GLUCOSE SERPL-MCNC: 85 MG/DL
HCT VFR BLD AUTO: 42.2 %
HGB BLD-MCNC: 14.2 G/DL
IGA SERPL-MCNC: 254 MG/DL
IGE SERPL-ACNC: <35 IU/ML
IGG SERPL-MCNC: 1175 MG/DL
IGM SERPL-MCNC: 160 MG/DL
IMM GRANULOCYTES # BLD AUTO: 0.01 K/UL
IMM GRANULOCYTES NFR BLD AUTO: 0.2 %
LYMPHOCYTES # BLD AUTO: 1.1 K/UL
LYMPHOCYTES NFR BLD: 26.3 %
MCH RBC QN AUTO: 29.3 PG
MCHC RBC AUTO-ENTMCNC: 33.6 G/DL
MCV RBC AUTO: 87 FL
MONOCYTES # BLD AUTO: 0.4 K/UL
MONOCYTES NFR BLD: 9.3 %
NEUTROPHILS # BLD AUTO: 2.6 K/UL
NEUTROPHILS NFR BLD: 60.9 %
NRBC BLD-RTO: 0 /100 WBC
PLATELET # BLD AUTO: 144 K/UL
PMV BLD AUTO: 10.5 FL
POTASSIUM SERPL-SCNC: 3.9 MMOL/L
PROT SERPL-MCNC: 7.7 G/DL
RBC # BLD AUTO: 4.85 M/UL
SODIUM SERPL-SCNC: 139 MMOL/L
WBC # BLD AUTO: 4.19 K/UL

## 2019-02-01 PROCEDURE — 82784 ASSAY IGA/IGD/IGG/IGM EACH: CPT

## 2019-02-01 PROCEDURE — 85652 RBC SED RATE AUTOMATED: CPT

## 2019-02-01 PROCEDURE — 82784 ASSAY IGA/IGD/IGG/IGM EACH: CPT | Mod: 59

## 2019-02-01 PROCEDURE — 99204 PR OFFICE/OUTPT VISIT, NEW, LEVL IV, 45-59 MIN: ICD-10-PCS | Mod: S$GLB,,, | Performed by: STUDENT IN AN ORGANIZED HEALTH CARE EDUCATION/TRAINING PROGRAM

## 2019-02-01 PROCEDURE — 99999 PR PBB SHADOW E&M-EST. PATIENT-LVL III: ICD-10-PCS | Mod: PBBFAC,,, | Performed by: STUDENT IN AN ORGANIZED HEALTH CARE EDUCATION/TRAINING PROGRAM

## 2019-02-01 PROCEDURE — 3008F BODY MASS INDEX DOCD: CPT | Mod: CPTII,S$GLB,, | Performed by: STUDENT IN AN ORGANIZED HEALTH CARE EDUCATION/TRAINING PROGRAM

## 2019-02-01 PROCEDURE — 82787 IGG 1 2 3 OR 4 EACH: CPT

## 2019-02-01 PROCEDURE — 86235 NUCLEAR ANTIGEN ANTIBODY: CPT | Mod: 59

## 2019-02-01 PROCEDURE — 86140 C-REACTIVE PROTEIN: CPT

## 2019-02-01 PROCEDURE — 82785 ASSAY OF IGE: CPT

## 2019-02-01 PROCEDURE — 86235 NUCLEAR ANTIGEN ANTIBODY: CPT

## 2019-02-01 PROCEDURE — 83520 IMMUNOASSAY QUANT NOS NONAB: CPT

## 2019-02-01 PROCEDURE — 80053 COMPREHEN METABOLIC PANEL: CPT

## 2019-02-01 PROCEDURE — 83516 IMMUNOASSAY NONANTIBODY: CPT | Mod: 59

## 2019-02-01 PROCEDURE — 86255 FLUORESCENT ANTIBODY SCREEN: CPT

## 2019-02-01 PROCEDURE — 3008F PR BODY MASS INDEX (BMI) DOCUMENTED: ICD-10-PCS | Mod: CPTII,S$GLB,, | Performed by: STUDENT IN AN ORGANIZED HEALTH CARE EDUCATION/TRAINING PROGRAM

## 2019-02-01 PROCEDURE — 85025 COMPLETE CBC W/AUTO DIFF WBC: CPT

## 2019-02-01 PROCEDURE — 99999 PR PBB SHADOW E&M-EST. PATIENT-LVL III: CPT | Mod: PBBFAC,,, | Performed by: STUDENT IN AN ORGANIZED HEALTH CARE EDUCATION/TRAINING PROGRAM

## 2019-02-01 PROCEDURE — 83516 IMMUNOASSAY NONANTIBODY: CPT

## 2019-02-01 PROCEDURE — 99204 OFFICE O/P NEW MOD 45 MIN: CPT | Mod: S$GLB,,, | Performed by: STUDENT IN AN ORGANIZED HEALTH CARE EDUCATION/TRAINING PROGRAM

## 2019-02-01 PROCEDURE — 82164 ANGIOTENSIN I ENZYME TEST: CPT

## 2019-02-02 LAB — ACE SERPL-CCNC: 48 U/L

## 2019-02-04 ENCOUNTER — TELEPHONE (OUTPATIENT)
Dept: RHEUMATOLOGY | Facility: CLINIC | Age: 39
End: 2019-02-04

## 2019-02-04 ENCOUNTER — TELEPHONE (OUTPATIENT)
Dept: GENETICS | Facility: CLINIC | Age: 39
End: 2019-02-04

## 2019-02-04 LAB
ANCA AB TITR SER IF: NORMAL TITER
ENA SCL70 AB SER-ACNC: 6 UNITS
IGG4 SER-MCNC: 33 MG/DL
MYELOPEROXIDASE AB SER-ACNC: 4 UNITS
P-ANCA TITR SER IF: NORMAL TITER
PROTEINASE3 IGG SER-ACNC: <0.2 U

## 2019-02-04 NOTE — TELEPHONE ENCOUNTER
Mr. Can returned call to schedule consult appointment. Patient's genetics consult scheduled on 8/19/2019 at 9:00 am with Dr. Edmonds.  Mr. Can informed of the appointment date and time.  He voiced understanding and repeated the appointment information.

## 2019-02-06 ENCOUNTER — TELEPHONE (OUTPATIENT)
Dept: RHEUMATOLOGY | Facility: CLINIC | Age: 39
End: 2019-02-06

## 2019-02-07 ENCOUNTER — TELEPHONE (OUTPATIENT)
Dept: RHEUMATOLOGY | Facility: CLINIC | Age: 39
End: 2019-02-07

## 2019-02-08 ENCOUNTER — PATIENT MESSAGE (OUTPATIENT)
Dept: RHEUMATOLOGY | Facility: CLINIC | Age: 39
End: 2019-02-08

## 2019-02-11 ENCOUNTER — LAB VISIT (OUTPATIENT)
Dept: LAB | Facility: HOSPITAL | Age: 39
End: 2019-02-11
Attending: INTERNAL MEDICINE
Payer: COMMERCIAL

## 2019-02-11 DIAGNOSIS — J32.9 SINUSITIS, UNSPECIFIED CHRONICITY, UNSPECIFIED LOCATION: ICD-10-CM

## 2019-02-11 PROCEDURE — 36415 COLL VENOUS BLD VENIPUNCTURE: CPT

## 2019-02-11 PROCEDURE — 82787 IGG 1 2 3 OR 4 EACH: CPT

## 2019-02-12 LAB
IGG1 SER-MCNC: 651 MG/DL
IGG2 SER-MCNC: 465 MG/DL
IGG3 SER-MCNC: 50 MG/DL
IGG4 SER-MCNC: 33 MG/DL

## 2019-02-12 NOTE — PROCEDURES
"Dear Doctor Shira,    The sleep study that you ordered is complete.  You have ordered sleep LAB services to perform the sleep study for Juan José Ng.     Please find Sleep Study result in "Chart Review" under the "Media tab."      As the ordering provider, you are responsible for reviewing the results and implementing a treatment plan with your patient.    If you need a Sleep Medicine provider to explain the sleep study findings and arrange treatment for the patient, please refer patient for consultation to our Sleep Clinic via [facility] with Ambulatory Consult Sleep.    To do that please place an order for an  "Ambulatory Consult Sleep" - it will go to our clinic work queue for our Medical Assistant to contact the patient for an appointment.     For any questions, please contact our clinic staff at [phone] to talk to clinical staff        Lynn Watt MD  Sleep Lab Medical Director  [facility]      "

## 2019-02-13 ENCOUNTER — TELEPHONE (OUTPATIENT)
Dept: INTERNAL MEDICINE | Facility: CLINIC | Age: 39
End: 2019-02-13

## 2019-02-13 DIAGNOSIS — G47.33 OSA (OBSTRUCTIVE SLEEP APNEA): Primary | ICD-10-CM

## 2019-02-13 NOTE — TELEPHONE ENCOUNTER
Pt attempted to go for CPAP titration but unable to complete due to claustrophobic sx.  Will refer to sleep clinic for further evaluation.  Pt notified.  MA to assist c scheduling.

## 2019-02-15 LAB
ANTI-PM/SCL AB: <20 UNITS
ANTI-SS-A 52 KD AB, IGG: <20 UNITS
EJ AB SER QL: NEGATIVE
ENA JO1 AB SER IA-ACNC: <20 UNITS
ENA SM+RNP AB SER IA-ACNC: <20 UNITS
FIBRILLARIN (U3 RNP): NEGATIVE
KU AB SER QL: NEGATIVE
MDA-5 (P140): <20 UNITS
MI2 AB SER QL: NEGATIVE
NXP-2 (P140): <20 UNITS
OJ AB SER QL: NEGATIVE
PL12 AB SER QL: NEGATIVE
PL7 AB SER QL: NEGATIVE
SRP AB SERPL QL: NEGATIVE
TIF1 GAMMA (P155/140): <20 UNITS
U2 SNRNP: NEGATIVE

## 2019-02-18 ENCOUNTER — OFFICE VISIT (OUTPATIENT)
Dept: URGENT CARE | Facility: CLINIC | Age: 39
End: 2019-02-18
Payer: COMMERCIAL

## 2019-02-18 VITALS
BODY MASS INDEX: 30.2 KG/M2 | OXYGEN SATURATION: 98 % | DIASTOLIC BLOOD PRESSURE: 89 MMHG | TEMPERATURE: 98 F | RESPIRATION RATE: 19 BRPM | HEART RATE: 94 BPM | WEIGHT: 248 LBS | SYSTOLIC BLOOD PRESSURE: 125 MMHG | HEIGHT: 76 IN

## 2019-02-18 DIAGNOSIS — J10.1 INFLUENZA A: Primary | ICD-10-CM

## 2019-02-18 LAB
CTP QC/QA: YES
FLUAV AG NPH QL: NEGATIVE
FLUBV AG NPH QL: NEGATIVE

## 2019-02-18 PROCEDURE — 3008F PR BODY MASS INDEX (BMI) DOCUMENTED: ICD-10-PCS | Mod: CPTII,S$GLB,, | Performed by: NURSE PRACTITIONER

## 2019-02-18 PROCEDURE — 99214 PR OFFICE/OUTPT VISIT, EST, LEVL IV, 30-39 MIN: ICD-10-PCS | Mod: S$GLB,,, | Performed by: NURSE PRACTITIONER

## 2019-02-18 PROCEDURE — 3008F BODY MASS INDEX DOCD: CPT | Mod: CPTII,S$GLB,, | Performed by: NURSE PRACTITIONER

## 2019-02-18 PROCEDURE — 99214 OFFICE O/P EST MOD 30 MIN: CPT | Mod: S$GLB,,, | Performed by: NURSE PRACTITIONER

## 2019-02-18 PROCEDURE — 87804 INFLUENZA ASSAY W/OPTIC: CPT | Mod: QW,S$GLB,, | Performed by: NURSE PRACTITIONER

## 2019-02-18 PROCEDURE — 87804 POCT INFLUENZA A/B: ICD-10-PCS | Mod: QW,S$GLB,, | Performed by: NURSE PRACTITIONER

## 2019-02-18 RX ORDER — OSELTAMIVIR PHOSPHATE 75 MG/1
75 CAPSULE ORAL 2 TIMES DAILY
Qty: 10 CAPSULE | Refills: 0 | Status: SHIPPED | OUTPATIENT
Start: 2019-02-18 | End: 2019-02-23

## 2019-02-18 NOTE — PROGRESS NOTES
"Subjective:       Patient ID: Juan José Ng is a 38 y.o. male.    Vitals:  height is 6' 4" (1.93 m) and weight is 112.5 kg (248 lb). His oral temperature is 98.3 °F (36.8 °C). His blood pressure is 125/89 and his pulse is 94. His respiration is 19 and oxygen saturation is 98%.     Chief Complaint: URI (body aches, headaches, cough)      The patient presents to the clinic today with complaints of worsening body aches and fever that started 3-4 hours prior to arrival.  The patient did not receive his flu shot this year and his wife is a .  Per the patient claims that his wife had mild body aches and chills this weekend but she did receive her flu shot.  Most of his wife's classes currently out with the flu so he was concerned about a possible flu exposure.  The patient has to work the next 4-5 days and he is concerned about possibly having the flu.  Complains of mild cough and congestion but has an extensive history of sinusitis.       URI    This is a new problem. The current episode started today. The problem has been unchanged. There has been no fever. Associated symptoms include congestion, coughing and headaches. Pertinent negatives include no chest pain, diarrhea, dysuria, nausea, rash, sore throat or vomiting. He has tried nothing for the symptoms.       Constitution: Negative for chills, fatigue and fever.   HENT: Positive for congestion. Negative for sore throat.    Neck: Negative for painful lymph nodes.   Cardiovascular: Negative for chest pain and leg swelling.   Eyes: Negative for double vision and blurred vision.   Respiratory: Positive for cough. Negative for shortness of breath.    Gastrointestinal: Negative for nausea, vomiting and diarrhea.   Genitourinary: Negative for dysuria, frequency and urgency.   Musculoskeletal: Positive for muscle ache. Negative for joint pain, joint swelling and muscle cramps.   Skin: Negative for color change, pale and rash.   Allergic/Immunologic: " Negative for seasonal allergies.   Neurological: Positive for headaches. Negative for dizziness, history of vertigo, light-headedness and passing out.   Hematologic/Lymphatic: Negative for swollen lymph nodes, easy bruising/bleeding and history of blood clots. Does not bruise/bleed easily.   Psychiatric/Behavioral: Negative for nervous/anxious, sleep disturbance and depression. The patient is not nervous/anxious.        Objective:      Physical Exam   Constitutional: He is oriented to person, place, and time. He appears well-developed and well-nourished. He is cooperative.  Non-toxic appearance. He does not appear ill. No distress.   HENT:   Head: Normocephalic and atraumatic.   Right Ear: Hearing, external ear and ear canal normal. Tympanic membrane is bulging.   Left Ear: Hearing, external ear and ear canal normal. Tympanic membrane is bulging.   Nose: Mucosal edema and rhinorrhea present. No nasal deformity. No epistaxis. Right sinus exhibits no maxillary sinus tenderness and no frontal sinus tenderness. Left sinus exhibits no maxillary sinus tenderness and no frontal sinus tenderness.   Mouth/Throat: Uvula is midline, oropharynx is clear and moist and mucous membranes are normal. No trismus in the jaw. Normal dentition. No uvula swelling. No posterior oropharyngeal erythema.   Eyes: Conjunctivae and lids are normal. No scleral icterus.   Sclera clear bilat   Neck: Trachea normal, full passive range of motion without pain and phonation normal. Neck supple.   Cardiovascular: Normal rate, regular rhythm, normal heart sounds, intact distal pulses and normal pulses.   Pulmonary/Chest: Effort normal and breath sounds normal. No respiratory distress.   Abdominal: Soft. Normal appearance and bowel sounds are normal. He exhibits no distension. There is no tenderness.   Musculoskeletal: Normal range of motion. He exhibits no edema or deformity.   Neurological: He is alert and oriented to person, place, and time. He  exhibits normal muscle tone. Coordination normal.   Skin: Skin is warm, dry and intact. He is not diaphoretic. No pallor.   Psychiatric: He has a normal mood and affect. His speech is normal and behavior is normal. Judgment and thought content normal. Cognition and memory are normal.   Nursing note and vitals reviewed.        Results for orders placed or performed in visit on 02/18/19   POCT Influenza A/B   Result Value Ref Range    Rapid Influenza A Ag Negative Negative    Rapid Influenza B Ag Negative Negative     Acceptable Yes        Assessment:       1. Influenza A        Plan:         Influenza A  -     POCT Influenza A/B  -     oseltamivir (TAMIFLU) 75 MG capsule; Take 1 capsule (75 mg total) by mouth 2 (two) times daily. for 5 days  Dispense: 10 capsule; Refill: 0      Patient Instructions       Influenza (Adult)    Influenza is also called the flu. It is a viral illness that affects the air passages of your lungs. It is different from the common cold. The flu can easily be passed from one to person to another. It may be spread through the air by coughing and sneezing. Or it can be spread by touching the sick person and then touching your own eyes, nose, or mouth.  The flu starts 1 to 3 days after you are exposed to the flu virus. It may last for 1 to 2 weeks but many people feel tired or fatigued for many weeks afterward. You usually dont need to take antibiotics unless you have a complication. This might be an ear or sinus infection or pneumonia.  Symptoms of the flu may be mild or severe. They can include extreme tiredness (wanting to stay in bed all day), chills, fevers, muscle aches, soreness with eye movement, headache, and a dry, hacking cough.  Home care  Follow these guidelines when caring for yourself at home:  · Avoid being around cigarette smoke, whether yours or other peoples.  · Acetaminophen or ibuprofen will help ease your fever, muscle aches, and headache. Dont give aspirin  to anyone younger than 18 who has the flu. Aspirin can harm the liver.  · Nausea and loss of appetite are common with the flu. Eat light meals. Drink 6 to 8 glasses of liquids every day. Good choices are water, sport drinks, soft drinks without caffeine, juices, tea, and soup. Extra fluids will also help loosen secretions in your nose and lungs.  · Over-the-counter cold medicines will not make the flu go away faster. But the medicines may help with coughing, sore throat, and congestion in your nose and sinuses. Dont use a decongestant if you have high blood pressure.  · Stay home until your fever has been gone for at least 24 hours without using medicine to reduce fever.  Follow-up care  Follow up with your healthcare provider, or as advised, if you are not getting better over the next week.  If you are age 65 or older, talk with your provider about getting a pneumococcal vaccine every 5 years. You should also get this vaccine if you have chronic asthma or COPD. All adults should get a flu vaccine every fall. Ask your provider about this.  When to seek medical advice  Call your healthcare provider right away if any of these occur:  · Cough with lots of colored mucus (sputum) or blood in your mucus  · Chest pain, shortness of breath, wheezing, or trouble breathing  · Severe headache, or face, neck, or ear pain  · New rash with fever  · Fever of 100.4°F (38°C) or higher, or as directed by your healthcare provider  · Confusion, behavior change, or seizure  · Severe weakness or dizziness  · You get a new fever or cough after getting better for a few days  Date Last Reviewed: 1/1/2017 © 2000-2017 [employer] [address] [place] [address], [place] [address]. All rights reserved. This information is not intended as a substitute for professional medical care. Always follow your healthcare professional's instructions.        The Flu (Influenza)     The virus that causes the flu spreads through the air in droplets  when someone who has the flu coughs, sneezes, laughs, or talks.   The flu (influenza) is an infection that affects your respiratory tract. This tract is made up of your mouth, nose, and lungs, and the passages between them. Unlike a cold, the flu can make you very ill. And it can lead to pneumonia, a serious lung infection. The flu can have serious complications and even cause death.  Who is at risk for the flu?  Anyone can get the flu. But you are more likely to become infected if you:  · Have a weakened immune system  · Work in a healthcare setting where you may be exposed to flu germs  · Live or work with someone who has the flu  · Havent had an annual flu shot  How does the flu spread?  The flu is caused by a virus. The virus spreads through the air in droplets when someone who has the flu coughs, sneezes, laughs, or talks. You can become infected when you inhale these viruses directly. You can also become infected when you touch a surface on which the droplets have landed and then transfer the germs to your eyes, nose, or mouth. Touching used tissues, or sharing utensils, drinking glasses, or a toothbrush from an infected person can expose you to flu viruses, too.  What are the symptoms of the flu?  Flu symptoms tend to come on quickly and may last a few days to a few weeks. They include:  · Fever usually higher than 100.4°F  (38°C) and chills  · Sore throat and headache  · Dry cough  · Runny nose  · Tiredness and weakness  · Muscle aches  Who is at risk for flu complications?  For some people, the flu can be very serious. The risk for complications is greater for:  · Children younger than age 5  · Adults ages 65 and older  · People with a chronic illness such as diabetes or heart, kidney, or lung disease  · People who live in a nursing home or long-term care facility   How is the flu treated?  The flu usually gets better after 7 days or so. In some cases, your healthcare provider may prescribe an antiviral  medicine. This may help you get well a little sooner. For the medicine to help, you need to take it as soon as possible (ideally within 48 hours) after your symptoms start. If you develop pneumonia or other serious illness, you may need to stay in the hospital.  Easing flu symptoms  · Drink lots of fluids such as water, juice, and warm soup. A good rule is to drink enough so that you urinate your normal amount.  · Get plenty of rest.  · Ask your healthcare provider what to take for fever and pain.  · Call your provider if your fever is 100.4°F (38°C) or higher, or you become dizzy, lightheaded, or short of breath.  Taking steps to protect others  · Wash your hands often, especially after coughing or sneezing. Or clean your hands with an alcohol-based hand  containing at least 60% alcohol.  · Cough or sneeze into a tissue. Then throw the tissue away and wash your hands. If you dont have a tissue, cough and sneeze into your elbow.  · Stay home until at least 24 hours after you no longer have a fever or chills. Be sure the fever isnt being hidden by fever-reducing medicine.  · Dont share food, utensils, drinking glasses, or a toothbrush with others.  · Ask your healthcare provider if others in your household should get antiviral medicine to help them avoid infection.  How can the flu be prevented?  · One of the best ways to avoid the flu is to get a flu vaccine each year. The virus that causes the flu changes from year to year. For that reason, healthcare providers recommend getting the flu vaccine each year, as soon as it's available in your area. The vaccine is given as a shot. Your healthcare provider can tell you which vaccine is right for you. A nasal spray is also available but is not recommended for the 4417-9251 flu season. The CDC says this is because the nasal spray did not seem to protect against the flu over the last several flu seasons. In the past, it was meant for people ages 2 to 49.  · Wash  your hands often. Frequent handwashing is a proven way to help prevent infection.  · Carry an alcohol-based hand gel containing at least 60% alcohol. Use it when you can't use soap and water. Then wash your hands as soon as you can.  · Avoid touching your eyes, nose, and mouth.  · At home and work, clean phones, computer keyboards, and toys often with disinfectant wipes.  · If possible, avoid close contact with others who have the flu or symptoms of the flu.  Handwashing tips  Handwashing is one of the best ways to prevent many common infections. If you are caring for or visiting someone with the flu, wash your hands each time you enter and leave the room. Follow these steps:  · Use warm water and plenty of soap. Rub your hands together well.  · Clean the whole hand, including under your nails, between your fingers, and up the wrists.  · Wash for at least 15 seconds.  · Rinse, letting the water run down your fingers, not up your wrists.  · Dry your hands well. Use a paper towel to turn off the faucet and open the door.  Using alcohol-based hand   Alcohol-based hand  are also a good choice. Use them when you can't use soap and water. Follow these steps:  · Squeeze about a tablespoon of gel into the palm of one hand.  · Rub your hands together briskly, cleaning the backs of your hands, the palms, between your fingers, and up the wrists.  · Rub until the gel is gone and your hands are completely dry.  Preventing the flu in healthcare settings  The flu is a special concern for people in hospitals and long-term care facilities. To help prevent the spread of flu, many hospitals and nursing homes take these steps:  · Healthcare providers wash their hands or use an alcohol-based hand  before and after treating each patient.  · People with the flu have private rooms and bathrooms or share a room with someone with the same infection.  · People who are at high risk for the flu but don't have it are  encouraged to get the flu and pneumonia vaccines.  · All healthcare workers are encouraged or required to get flu shots.   Date Last Reviewed: 12/1/2016  © 9184-2333 [employer] [address] [place] [address], [place] [address]. All rights reserved. This information is not intended as a substitute for professional medical care. Always follow your healthcare professional's instructions.      -Flu swab is negative today but concerns for flu in the clinic today.  -Tamiflu twice daily for 5 days.  -Alternate tylenol and motrin.  -Follow up with your primary care doctor.  Please follow up with your Primary care provider within 2-5 days if your signs and symptoms have not resolved or worsen.     If your condition worsens or fails to improve we recommend that you receive another evaluation at the emergency room immediately or contact your primary medical clinic to discuss your concerns.   You must understand that you have received an Urgent Care treatment only and that you may be released before all of your medical problems are known or treated. You, the patient, will arrange for follow up care as instructed.

## 2019-02-19 NOTE — PATIENT INSTRUCTIONS
Influenza (Adult)    Influenza is also called the flu. It is a viral illness that affects the air passages of your lungs. It is different from the common cold. The flu can easily be passed from one to person to another. It may be spread through the air by coughing and sneezing. Or it can be spread by touching the sick person and then touching your own eyes, nose, or mouth.  The flu starts 1 to 3 days after you are exposed to the flu virus. It may last for 1 to 2 weeks but many people feel tired or fatigued for many weeks afterward. You usually dont need to take antibiotics unless you have a complication. This might be an ear or sinus infection or pneumonia.  Symptoms of the flu may be mild or severe. They can include extreme tiredness (wanting to stay in bed all day), chills, fevers, muscle aches, soreness with eye movement, headache, and a dry, hacking cough.  Home care  Follow these guidelines when caring for yourself at home:  · Avoid being around cigarette smoke, whether yours or other peoples.  · Acetaminophen or ibuprofen will help ease your fever, muscle aches, and headache. Dont give aspirin to anyone younger than 18 who has the flu. Aspirin can harm the liver.  · Nausea and loss of appetite are common with the flu. Eat light meals. Drink 6 to 8 glasses of liquids every day. Good choices are water, sport drinks, soft drinks without caffeine, juices, tea, and soup. Extra fluids will also help loosen secretions in your nose and lungs.  · Over-the-counter cold medicines will not make the flu go away faster. But the medicines may help with coughing, sore throat, and congestion in your nose and sinuses. Dont use a decongestant if you have high blood pressure.  · Stay home until your fever has been gone for at least 24 hours without using medicine to reduce fever.  Follow-up care  Follow up with your healthcare provider, or as advised, if you are not getting better over the next week.  If you are age 65 or  older, talk with your provider about getting a pneumococcal vaccine every 5 years. You should also get this vaccine if you have chronic asthma or COPD. All adults should get a flu vaccine every fall. Ask your provider about this.  When to seek medical advice  Call your healthcare provider right away if any of these occur:  · Cough with lots of colored mucus (sputum) or blood in your mucus  · Chest pain, shortness of breath, wheezing, or trouble breathing  · Severe headache, or face, neck, or ear pain  · New rash with fever  · Fever of 100.4°F (38°C) or higher, or as directed by your healthcare provider  · Confusion, behavior change, or seizure  · Severe weakness or dizziness  · You get a new fever or cough after getting better for a few days  Date Last Reviewed: 1/1/2017 © 2000-2017 [employer]. [address] [place] [address]. All rights reserved. This information is not intended as a substitute for professional medical care. Always follow your healthcare professional's instructions.        The Flu (Influenza)     The virus that causes the flu spreads through the air in droplets when someone who has the flu coughs, sneezes, laughs, or talks.   The flu (influenza) is an infection that affects your respiratory tract. This tract is made up of your mouth, nose, and lungs, and the passages between them. Unlike a cold, the flu can make you very ill. And it can lead to pneumonia, a serious lung infection. The flu can have serious complications and even cause death.  Who is at risk for the flu?  Anyone can get the flu. But you are more likely to become infected if you:  · Have a weakened immune system  · Work in a healthcare setting where you may be exposed to flu germs  · Live or work with someone who has the flu  · Havent had an annual flu shot  How does the flu spread?  The flu is caused by a virus. The virus spreads through the air in droplets when someone who has the flu coughs, sneezes,  laughs, or talks. You can become infected when you inhale these viruses directly. You can also become infected when you touch a surface on which the droplets have landed and then transfer the germs to your eyes, nose, or mouth. Touching used tissues, or sharing utensils, drinking glasses, or a toothbrush from an infected person can expose you to flu viruses, too.  What are the symptoms of the flu?  Flu symptoms tend to come on quickly and may last a few days to a few weeks. They include:  · Fever usually higher than 100.4°F  (38°C) and chills  · Sore throat and headache  · Dry cough  · Runny nose  · Tiredness and weakness  · Muscle aches  Who is at risk for flu complications?  For some people, the flu can be very serious. The risk for complications is greater for:  · Children younger than age 5  · Adults ages 65 and older  · People with a chronic illness such as diabetes or heart, kidney, or lung disease  · People who live in a nursing home or long-term care facility   How is the flu treated?  The flu usually gets better after 7 days or so. In some cases, your healthcare provider may prescribe an antiviral medicine. This may help you get well a little sooner. For the medicine to help, you need to take it as soon as possible (ideally within 48 hours) after your symptoms start. If you develop pneumonia or other serious illness, you may need to stay in the hospital.  Easing flu symptoms  · Drink lots of fluids such as water, juice, and warm soup. A good rule is to drink enough so that you urinate your normal amount.  · Get plenty of rest.  · Ask your healthcare provider what to take for fever and pain.  · Call your provider if your fever is 100.4°F (38°C) or higher, or you become dizzy, lightheaded, or short of breath.  Taking steps to protect others  · Wash your hands often, especially after coughing or sneezing. Or clean your hands with an alcohol-based hand  containing at least 60% alcohol.  · Cough or sneeze  into a tissue. Then throw the tissue away and wash your hands. If you dont have a tissue, cough and sneeze into your elbow.  · Stay home until at least 24 hours after you no longer have a fever or chills. Be sure the fever isnt being hidden by fever-reducing medicine.  · Dont share food, utensils, drinking glasses, or a toothbrush with others.  · Ask your healthcare provider if others in your household should get antiviral medicine to help them avoid infection.  How can the flu be prevented?  · One of the best ways to avoid the flu is to get a flu vaccine each year. The virus that causes the flu changes from year to year. For that reason, healthcare providers recommend getting the flu vaccine each year, as soon as it's available in your area. The vaccine is given as a shot. Your healthcare provider can tell you which vaccine is right for you. A nasal spray is also available but is not recommended for the 2507-1056 flu season. The CDC says this is because the nasal spray did not seem to protect against the flu over the last several flu seasons. In the past, it was meant for people ages 2 to 49.  · Wash your hands often. Frequent handwashing is a proven way to help prevent infection.  · Carry an alcohol-based hand gel containing at least 60% alcohol. Use it when you can't use soap and water. Then wash your hands as soon as you can.  · Avoid touching your eyes, nose, and mouth.  · At home and work, clean phones, computer keyboards, and toys often with disinfectant wipes.  · If possible, avoid close contact with others who have the flu or symptoms of the flu.  Handwashing tips  Handwashing is one of the best ways to prevent many common infections. If you are caring for or visiting someone with the flu, wash your hands each time you enter and leave the room. Follow these steps:  · Use warm water and plenty of soap. Rub your hands together well.  · Clean the whole hand, including under your nails, between your fingers,  and up the wrists.  · Wash for at least 15 seconds.  · Rinse, letting the water run down your fingers, not up your wrists.  · Dry your hands well. Use a paper towel to turn off the faucet and open the door.  Using alcohol-based hand   Alcohol-based hand  are also a good choice. Use them when you can't use soap and water. Follow these steps:  · Squeeze about a tablespoon of gel into the palm of one hand.  · Rub your hands together briskly, cleaning the backs of your hands, the palms, between your fingers, and up the wrists.  · Rub until the gel is gone and your hands are completely dry.  Preventing the flu in healthcare settings  The flu is a special concern for people in hospitals and long-term care facilities. To help prevent the spread of flu, many hospitals and nursing homes take these steps:  · Healthcare providers wash their hands or use an alcohol-based hand  before and after treating each patient.  · People with the flu have private rooms and bathrooms or share a room with someone with the same infection.  · People who are at high risk for the flu but don't have it are encouraged to get the flu and pneumonia vaccines.  · All healthcare workers are encouraged or required to get flu shots.   Date Last Reviewed: 12/1/2016  © 2547-2695 [employer]. [address] [place] [address], [place] [address]. All rights reserved. This information is not intended as a substitute for professional medical care. Always follow your healthcare professional's instructions.      -Flu swab is negative today but concerns for flu in the clinic today.  -Tamiflu twice daily for 5 days.  -Alternate tylenol and motrin.  -Follow up with your primary care doctor.  Please follow up with your Primary care provider within 2-5 days if your signs and symptoms have not resolved or worsen.     If your condition worsens or fails to improve we recommend that you receive another evaluation at the emergency room  immediately or contact your primary medical clinic to discuss your concerns.   You must understand that you have received an Urgent Care treatment only and that you may be released before all of your medical problems are known or treated. You, the patient, will arrange for follow up care as instructed.

## 2019-03-21 ENCOUNTER — TELEPHONE (OUTPATIENT)
Dept: RHEUMATOLOGY | Facility: CLINIC | Age: 39
End: 2019-03-21

## 2019-04-03 ENCOUNTER — PATIENT MESSAGE (OUTPATIENT)
Dept: RHEUMATOLOGY | Facility: CLINIC | Age: 39
End: 2019-04-03

## 2019-04-03 ENCOUNTER — OFFICE VISIT (OUTPATIENT)
Dept: SLEEP MEDICINE | Facility: CLINIC | Age: 39
End: 2019-04-03
Payer: COMMERCIAL

## 2019-04-03 VITALS
HEART RATE: 93 BPM | SYSTOLIC BLOOD PRESSURE: 127 MMHG | DIASTOLIC BLOOD PRESSURE: 87 MMHG | WEIGHT: 246.25 LBS | BODY MASS INDEX: 29.99 KG/M2 | HEIGHT: 76 IN

## 2019-04-03 DIAGNOSIS — G47.33 OSA (OBSTRUCTIVE SLEEP APNEA): Primary | ICD-10-CM

## 2019-04-03 PROCEDURE — 99204 OFFICE O/P NEW MOD 45 MIN: CPT | Mod: S$GLB,,, | Performed by: INTERNAL MEDICINE

## 2019-04-03 PROCEDURE — 99999 PR PBB SHADOW E&M-EST. PATIENT-LVL III: CPT | Mod: PBBFAC,,, | Performed by: INTERNAL MEDICINE

## 2019-04-03 PROCEDURE — 3008F PR BODY MASS INDEX (BMI) DOCUMENTED: ICD-10-PCS | Mod: CPTII,S$GLB,, | Performed by: INTERNAL MEDICINE

## 2019-04-03 PROCEDURE — 99999 PR PBB SHADOW E&M-EST. PATIENT-LVL III: ICD-10-PCS | Mod: PBBFAC,,, | Performed by: INTERNAL MEDICINE

## 2019-04-03 PROCEDURE — 3008F BODY MASS INDEX DOCD: CPT | Mod: CPTII,S$GLB,, | Performed by: INTERNAL MEDICINE

## 2019-04-03 PROCEDURE — 99204 PR OFFICE/OUTPT VISIT, NEW, LEVL IV, 45-59 MIN: ICD-10-PCS | Mod: S$GLB,,, | Performed by: INTERNAL MEDICINE

## 2019-04-03 NOTE — PROGRESS NOTES
This 38 y.o. male patient presents for the evaluation of possible obstructive sleep apnea.    Referring Provider: Dr. Gutierrez      Pt is referred for evaluation of GISELL by PCP. he is coming here as  been diagnosed with GISELL since 2018, severe GISELL AHI- 39 underwent PAP titration and was unable to tolerate the PAP therapy.   Pt has been fatigued and tired for the past 19 years . +snoring for the past 4-5  Years, unclear  witnessed apneas, negative waking up gasping for air. wakes 5-6 times unknown reason/nocturia, takes a min's to go  back to sleep, + tired on waking up all the  time,  No recent caffeine in take , - urge to move legs/ leg kicking. + nasal congestion. - cataplexy,-  hallucination, +sleep paralysis once in life . - acting out dreams. - vivid dream.  Pt prefer to sleep on the side/stomach .     Pt denies having head injury and hospitalization due to viral infection. - Sleep walking/talking. Memory is not so good . Pt feels anxious/depressed/content.  Denies any sleep related injury. Night mare  often.Pt does not have  morning headache.   Pt doesn't  feel  Drowsy while driving. Pt is nose breather. Pt does not wake up to eat at night. Pt doesn't sweat at night. Pt does not grind teeth.  Patient denies restless leg issues does toss and turn while sleeping.         Severe GISELL AHI 39,RDI: 64, < 90 Spo2: 1.4 %, Mean Spo2: 95.4 %, time snorin.3 %       Consider auto-titrating CPAP at 10-18 cm H2O, mask of patients choice, chin strap, and heated humidification, which is essential in conjunction with PAP to prevent airway drying and irritation.   2. The patient will need close follow up for symptomatic improvement and APAP download to ensure optimal treatment.   3. Consider CPAP desensitization session with a respiratory therapist prior to initiating CPAP use (please enter CPAP desensitization session in a comment section of CPAP for HOME USE prescription in Epic).   4. Clinical evaluation for possible  RLS (Restless legs Syndrome).       EPWORTH SLEEPINESS SCALE 4/2/2019   Sitting and reading 0   Watching TV 0   Sitting, inactive in a public place (e.g. a theatre or a meeting) 0   As a passenger in a car for an hour without a break 0   Lying down to rest in the afternoon when circumstances permit 1   Sitting and talking to someone 0   Sitting quietly after a lunch without alcohol 0   In a car, while stopped for a few minutes in traffic 0   Total score 1     Sleep Clinic New Patient 4/2/2019   What time do you go to bed on a week day? (Give a range) 11:30 PM - 1:00 AM   What time do you go to bed on a day off? (Give a range) 11:30 PM - 1:00 AM   How long does it take you to fall asleep? (Give a range) 15 min - 1 hour   On average, how many times per night do you wake up? 5-6 times   How long does it take you to fall back into sleep? (Give a range) 1 - 5 minutes   What time do you wake up to start your day on a week day? (Give a range) 8:00 AM - 9:30 AM   What time do you wake up to start your day on a day off? (Give a range) 9:30 AM - 11:00 AM   What time do you get out of bed? (Give a range) 8:00 AM - 11:00 AM   On average, how many hours do you sleep? 7 - 9 hours per night   On average, how many naps do you take per day? Almost never   Rate your sleep quality from 0 to 5 (0-poor, 5-great). 2   Have you experienced:  N/a   How much weight have you lost or gained (in lbs.) in the last year? 0   On average, how many times per night do you go to the bathroom?  1   Have you ever had a sleep study/CPAP machine/surgery for sleep apnea? Yes   Have you ever had a CPAP machine for sleep apnea? No   Have you ever had surgery for sleep apnea? No     Sleep Clinic ROS  4/2/2019   Difficulty breathing through the nose?  No   Sore throat or dry mouth in the morning? No   Irregular or very fast heart beat?  No   Shortness of breath?  No   Acid reflux? No   Body aches and pains?  Sometimes   Morning headaches? No   Dizziness? No  "  Mood changes?  Sometimes   Do you exercise?  No   Do you feel like moving your legs a lot?  No     JOHN-7 Score: (P) 4  Interpretation: (P) Normal    No flowsheet data found.    SLEEP ROUTINE:  Bed partner: yes  Daytime naps: none    Past Medical History:   Diagnosis Date    Allergy     Anxiety     Depression     Hx of psychiatric care     Major depression, chronic 7/6/2017    Psychiatric problem     Recurrent upper respiratory infection (URI)     Therapy        Past Surgical History:   Procedure Laterality Date    APPENDECTOMY      SINUS SURGERY         Family History   Problem Relation Age of Onset    Diverticulitis Mother     Hypertension Father     Diabetes Paternal Aunt     Diabetes Paternal Uncle     Cancer Cousin         some type of intestinal cancer    Depression Brother        Social History     Tobacco Use    Smoking status: Never Smoker    Smokeless tobacco: Never Used   Substance Use Topics    Alcohol use: No    Drug use: No       ALLERGIES: Reviewed in EPIC    CURRENT MEDICATIONS: Reviewed in EPIC    REVIEW OF SYSTEMS:  Sleep related symptoms as per HPI;    Denies weight gain;    Denies sinus problems;    Denies dyspnea;    Denies palpitations;    Denies acid reflux;    Denies polyuria;    Denies headaches;    Denies mood disturbance;    Denies anemia;    Otherwise, a balance of systems reviewed is negative.    PHYSICAL EXAM:  /87   Pulse 93   Ht 6' 4" (1.93 m)   Wt 111.7 kg (246 lb 4.1 oz)   BMI 29.97 kg/m²   GENERAL: Well groomed; Normally developed;  HEENT: Conjunctivae are non-erythematous; Pupils equal, round, and reactive to light;    Nose is symmetrical; Nasal mucosa is pink and moist; Septum is midline;    Turbinates are normal; Nasal airflow is normal;    Posterior pharynx is pink; Posterior palate is normal; Modified Mallampati: IV   Uvula is normal; Tongue is normal; Tonsils +1;    Dentition is fair; No TMJ tenderness;    Jaw opening and protrusion without " click and without discomfort.  NECK: Supple. No thyromegaly. No palpable nodes. Neck circumference in inches is 16 inch   SKIN: On face and neck: No abrasions, no rashes, no lesions.     No subcutaneous nodules are palpable.  RESPIRATORY: Chest is clear to auscultation.     Normal chest expansion and non-labored breathing at rest.  CARDIOVASCULAR: Normal S1, S2.  No murmurs, gallops or rubs.   No carotid bruits bilaterally.  EXTREMITIES: No clubbing. No cyanosis. Edema is absent.   NEURO: Oriented to time, place and person.    Normal attention span and concentration.  Station normal. Gait normal.  PSYCH: Affect is full. Mood is normal.    ASSESSMENT:    1. Sleep Apnea, unspecified. The patient symptomatically has snoring and excessive daytime sleepiness with exam findings of a crowded oral airway with medical co-morbidities of depression, anxiety and over weight .  Underwent a home sleep study showing severe obstructive sleep apnea AHI 39, underwent Pap titration study was unable to tolerate CPAP therapy.  Will suggest following       GISELL (obstructive sleep apnea)             PLAN:  Obstructive sleep apnea:  Discussed with the patient diagnosis obstructive sleep apnea and its implication and long-term impact if remained untreated.  Discussed with the patient the treatment options for obstructive sleep apnea and in view of his severe degree of disease would like to pursue Pap therapy again, patient agreed to it.  Discussed with the patient about desensitization therapy patient agreed to with dream where nasal mask without being on CPAP therapy while watching television and gradually increasing the time to desensitize the claustrophobia associated with mask for CPAP therapy.  Suggested to wear chin strap along with that.  Discussed the technique in detail patient would like to pursue an follow-up in 3 months.  Suggested to avoid sleeping supine.  Suggested to have good sleep hygiene.  Suggested to lose weight and will  follow him closely.    Education: During our discussion today, we talked about the etiology of obstructive sleep apnea as well as the potential ramifications of untreated sleep apnea, which could include daytime sleepiness, hypertension, heart disease and/or stroke.  We discussed potential treatment options, which could include weight loss, body positioning, use of an oral appliance, continuous positive airway pressure (CPAP), EPAP, or referral for surgical consideration.    Precautions: The patient was advised to abstain from driving should they feel sleepy or drowsy.    Follow up:  3 months with stephany Glover MD, FACP  Phone: [phone]  Fax: [phone]

## 2019-04-03 NOTE — LETTER
April 4, 2019      Lina Silva MD  [address]  [facility] [address]           [facility] [place] [address] [facility] [address]  [address]  [facility] [address]  Phone: [phone]          Patient: Juan José Ng   MR Number: [id number]   YOB: 1980   Date of Visit: 4/3/2019       Dear Dr. Gutierrez:    Thank you for referring Juan José Ng to me for evaluation. Attached you will find relevant portions of my assessment and plan of care.    If you have questions, please do not hesitate to call me. I look forward to following Juan José Ng along with you.    Sincerely,    Davian Glover MD    Enclosure  CC:  No Recipients    If you would like to receive this communication electronically, please contact [email or web address] or [phone] to request more information on [employer] Link access.    For providers and/or their staff who would like to refer a patient to [facility], please contact us through our one-stop-shop provider referral line, [facility], at [phone].    If you feel you have received this communication in error or would no longer like to receive these types of communications, please e-mail [email or web address]

## 2019-04-03 NOTE — PATIENT INSTRUCTIONS
Sleep Hygiene Practices    1. Try going to bed only when you are drowsy.    ?    2. If you are unable to fall asleep or stay asleep, leave your bedroom and engage in a quiet activity elsewhere. Do not permit yourself to fall asleep outside the bedroom. Return to bed when and only when you are sleepy. Repeat this process as often as necessary throughout night.    3. Maintain regular wake-up time, even on days off work & weekends    4. Use your bedroom for sleep and sex    5. Avoid napping during the daytime. If daytime sleepiness becomes overwhelming, limit nap time to a single nap of less than 1hr, no later than 3pm.    6. Distract your mind. Avoid clock watching. Lying in bed unable to sleep and frustrated needs to be avoided. Try reading or watching a videotape or listening to books on tape. It may be necessary to go into another room to do these.    7. Avoid caffeine within 4-6hrs of bedtime    8. Avoid use of nicotine close to bedtime    9. do not drink alcoholic beverages within 4-6hrs of bedtime    10. While a light snack before bedtime can help promote sound sleep, avoid large meals.    11. Obtain regular exercise, but avoid strenuous exercise within 4hrs of bedtime    12. Minimize light, noise, and extremes in temperature in the bedroom.    13. Precautions: The patient was advised to abstain from driving should they feel sleepy or drowsy.  ?    * This information is provided had been directly obtained from the American [employer] wellness booklet on sleep hygiene. ([facility], [address] [place] [address]

## 2019-06-09 NOTE — TELEPHONE ENCOUNTER
Contact: Juan José Ng    Called to schedule genetics consult. No answer, left message to return call to clinic to schedule consult appointment.                  Home

## 2019-07-08 ENCOUNTER — TELEPHONE (OUTPATIENT)
Dept: SLEEP MEDICINE | Facility: CLINIC | Age: 39
End: 2019-07-08

## 2019-07-08 NOTE — TELEPHONE ENCOUNTER
----- Message from Vamsi Carolina LPN sent at 7/8/2019  3:51 PM CDT -----  Contact: pt  Sent to audelia Glover  To: Adalberto Gao Staff  Pt scheduled with Adalberto Torres  ----- Message -----  From: Mirtha Fox  Sent: 7/8/2019   3:33 PM  To: Adalberto Gao Staff    Pt would like to be called back regarding his appt that he needs to r/s    Pt can be reached at [phone]

## 2019-07-10 ENCOUNTER — TELEPHONE (OUTPATIENT)
Dept: SLEEP MEDICINE | Facility: CLINIC | Age: 39
End: 2019-07-10

## 2019-07-10 NOTE — TELEPHONE ENCOUNTER
Patient left message to reschedule 07/15/2019 appointment until Nov/Dec. Providers schedule doesn't go out that far so a recall letter was submitted.

## 2019-07-22 ENCOUNTER — TELEPHONE (OUTPATIENT)
Dept: INTERNAL MEDICINE | Facility: CLINIC | Age: 39
End: 2019-07-22

## 2019-07-22 NOTE — TELEPHONE ENCOUNTER
----- Message from Lina Silva MD sent at 7/22/2019  8:22 AM CDT -----  Regarding: FW: Help arranging CF testing for patient   Please let pt know he is due for an annual exam.    ----- Message -----  From: Naomi Gómez MS  Sent: 7/19/2019   1:32 PM  To: Lina Silva MD  Subject: Help arranging CF testing for patient            Hello, we are seeing your patient, Juan José Ng, in genetics clinic later next month for recurrent sinus infection. I was wondering if he has ever had CF testing to your knowledge? If not, would this be something you could arrange for him? We don't think he needs a genetics consult. I can help you order the testing if needed. Thanks.

## 2019-08-19 ENCOUNTER — OFFICE VISIT (OUTPATIENT)
Dept: GENETICS | Facility: CLINIC | Age: 39
End: 2019-08-19
Payer: COMMERCIAL

## 2019-08-19 VITALS — HEIGHT: 76 IN | WEIGHT: 236.56 LBS | BODY MASS INDEX: 28.81 KG/M2

## 2019-08-19 DIAGNOSIS — J31.0 CHRONIC RHINITIS: Primary | ICD-10-CM

## 2019-08-19 DIAGNOSIS — J32.9 RECURRENT SINUSITIS: ICD-10-CM

## 2019-08-19 DIAGNOSIS — A31.0 PULMONARY MYCOBACTERIUM AVIUM COMPLEX (MAC) INFECTION: ICD-10-CM

## 2019-08-19 DIAGNOSIS — R53.82 CHRONIC FATIGUE: ICD-10-CM

## 2019-08-19 DIAGNOSIS — R05.9 COUGH: ICD-10-CM

## 2019-08-19 PROCEDURE — 99243 OFF/OP CNSLTJ NEW/EST LOW 30: CPT | Mod: S$GLB,,, | Performed by: MEDICAL GENETICS

## 2019-08-19 PROCEDURE — 99243 PR OFFICE CONSULTATION,LEVEL III: ICD-10-PCS | Mod: S$GLB,,, | Performed by: MEDICAL GENETICS

## 2019-08-19 PROCEDURE — 99999 PR PBB SHADOW E&M-EST. PATIENT-LVL III: ICD-10-PCS | Mod: PBBFAC,,, | Performed by: MEDICAL GENETICS

## 2019-08-19 PROCEDURE — 99999 PR PBB SHADOW E&M-EST. PATIENT-LVL III: CPT | Mod: PBBFAC,,, | Performed by: MEDICAL GENETICS

## 2019-08-19 NOTE — LETTER
August 19, 2019      Meera Fagan MD  [address]  [facility] [address]           [facility]yler Yeyo - [facility]  [address]  [facility] [address]  Phone: [phone]          Patient: Juan José Ng   MR Number: [id number]   YOB: 1980   Date of Visit: 8/19/2019       Dear Dr. Meera Fagan:    Thank you for referring Juan José Ng to me for evaluation. Attached you will find relevant portions of my assessment and plan of care.    If you have questions, please do not hesitate to call me. I look forward to following Juan José Ng along with you.    Sincerely,    César Edmonds MD    Enclosure  CC:  No Recipients    If you would like to receive this communication electronically, please contact [email or web address] or [phone] to request more information on [employer] access.    For providers and/or their staff who would like to refer a patient to [facility], please contact us through our one-stop-shop provider referral line, [facility], at [phone].    If you feel you have received this communication in error or would no longer like to receive these types of communications, please e-mail [email or web address]

## 2019-08-19 NOTE — PROGRESS NOTES
Juan José Ng   DOS: 19  : 78  MRN: [id number]    REFERRING MD: Meera Fagan     REASON FOR CONSULT: Our Medical Genetic Service was asked to evaluate this 38-year-old man with a history of recurrent sinus infections for the last 25 years.     PRESENT ILLNESS: Mr. Ng first started to experience sinus problems when he was 14 years old. Since then, he has seen multiple specialists without a diagnosis. He had sinus surgery at age 17 without any relief, and has been followed by ENT, rheumatology, infectious disease, and allergy/immunology. All allergy and immunology testing have been normal. Sperm motility studies were normal and essentially ruled out ciliary dyskinesia. He reports negative cystic fibrosis (CF) testing that was done 15 years ago but hes not sure what was done. He says it wasnt a sweat test. He has sleep apnea. A Lung CT revealed multiple nonspecific infiltrates. 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 He has a history of depression and is unable to work steadily given his frequent bouts with illness. He reports today for a genetic evaluation.     PAST MEDICAL HISTORY:  Cough  GISELL (obstructive sleep apnea)  Chronic fatigue  Major depression, chronic  Recurrent sinusitis  Chronic rhinitis  Recurrent sinus infections  Immunodeficiency    MEDICATIONS: SAMe that he says helps with depression more than conventional drugs    ALLERGIES: NKDA    DEVELOPMENTAL HISTORY: normal    FAMILY HISTORY: Mr. Ng does not have any children. He has a brother with a history of depression and anxiety. He has a brother with a history of seizures and an optic nerve tumor. He has a paternal uncle with muscular degeneration of unknown etiology. There is no other family history of recurrent sinus infections. Consanguinity was denied.     PHYSICAL EXAM: Wt: 236 lbs, Ht: 64, BMI 28. The patient is tall, normocephalic and nondysmorphic. He  had normal language  and cognition.    IMPRESSION: At this time, we have reviewed the family and medical history. While Mr. Ng is not classic for any syndrome, cystic fibrosis (CF) is a possibility especially given his chronic sinusitis and MAC infection of the lungs. Itd likely be atypical CF and sweat test may help. Katt therefore obtained full gene analysis for CFTR and if negative, will consider a sweat test vs less likely etiologies like PCD. He was fully agreeable with this plan.    RECOMMENDATIONS:   1. CFTR full gene analysis.  2. Consider sweat test and PCD.  3. Follow-up and additional testing (if necessary) depending on the results.     Time spent: 60 minutes, more than 50% was spent in counseling. The note is in epic.    César Edmonds M.D.                                                                                    Naomi Gómez, MPH, MS                 Section Head - Medical Genetics                                                                                     Genetic Counselor  [facility]

## 2019-09-05 ENCOUNTER — PATIENT MESSAGE (OUTPATIENT)
Dept: GENETICS | Facility: CLINIC | Age: 39
End: 2019-09-05

## 2019-09-10 DIAGNOSIS — J31.0 CHRONIC RHINITIS: Primary | ICD-10-CM

## 2019-10-07 ENCOUNTER — OFFICE VISIT (OUTPATIENT)
Dept: URGENT CARE | Facility: CLINIC | Age: 39
End: 2019-10-07
Payer: COMMERCIAL

## 2019-10-07 VITALS
TEMPERATURE: 99 F | HEART RATE: 87 BPM | WEIGHT: 236 LBS | OXYGEN SATURATION: 98 % | SYSTOLIC BLOOD PRESSURE: 118 MMHG | RESPIRATION RATE: 19 BRPM | BODY MASS INDEX: 28.74 KG/M2 | DIASTOLIC BLOOD PRESSURE: 84 MMHG | HEIGHT: 76 IN

## 2019-10-07 DIAGNOSIS — J02.9 SORE THROAT: Primary | ICD-10-CM

## 2019-10-07 LAB
CTP QC/QA: YES
S PYO RRNA THROAT QL PROBE: NEGATIVE

## 2019-10-07 PROCEDURE — 99214 PR OFFICE/OUTPT VISIT, EST, LEVL IV, 30-39 MIN: ICD-10-PCS | Mod: S$GLB,,, | Performed by: PHYSICIAN ASSISTANT

## 2019-10-07 PROCEDURE — 87880 POCT RAPID STREP A: ICD-10-PCS | Mod: QW,S$GLB,, | Performed by: PHYSICIAN ASSISTANT

## 2019-10-07 PROCEDURE — 99214 OFFICE O/P EST MOD 30 MIN: CPT | Mod: S$GLB,,, | Performed by: PHYSICIAN ASSISTANT

## 2019-10-07 PROCEDURE — 87880 STREP A ASSAY W/OPTIC: CPT | Mod: QW,S$GLB,, | Performed by: PHYSICIAN ASSISTANT

## 2019-10-07 PROCEDURE — 3008F PR BODY MASS INDEX (BMI) DOCUMENTED: ICD-10-PCS | Mod: CPTII,S$GLB,, | Performed by: PHYSICIAN ASSISTANT

## 2019-10-07 PROCEDURE — 3008F BODY MASS INDEX DOCD: CPT | Mod: CPTII,S$GLB,, | Performed by: PHYSICIAN ASSISTANT

## 2019-10-07 NOTE — PATIENT INSTRUCTIONS
Follow up with primary doctor or pulmonolgy within 1 week.  Go to ED for any change or worsening of symptoms.           Please drink plenty of fluids.  Please get plenty of rest.  Please return here or go to the Emergency Department for any concerns or worsening of condition.  If you were given wait & see antibiotics, please wait 3-5 days before taking them, and only take them if your symptoms have worsened or not improved.  If you do begin taking the antibiotics, please take them to completion.  If you were prescribed antibiotics, please take them to completion.  If you were prescribed a narcotic medication, do not drive or operate heavy equipment or machinery while taking these medications.  If you do not have Hypertension or any history of palpitations, it is ok to take over the counter Sudafed or Mucinex D or Allegra-D or Claritin-D or Zyrtec-D.  If you do take one of the above, it is ok to combine that with plain over the counter Mucinex or Allegra or Claritin or Zyrtec.  If for example you are taking Zyrtec -D, you can combine that with Mucinex, but not Mucinex-D.  If you are taking Mucinex-D, you can combine that with plain Allegra or Claritin or Zyrtec.   If you do have Hypertension or palpitations, it is safe to take Coricidin HBP for relief of sinus symptoms.  We recommend you take over the counter Flonase (Fluticasone) or another nasally inhaled steroid unless you are already taking one.  Nasal irrigation with a saline spray or Netti Pot like device per their directions is also recommended.  If not allergic, please take over the counter Tylenol (Acetaminophen) and/or Motrin (Ibuprofen) as directed for control of pain and/or fever.  Please follow up with your primary care doctor or specialist as needed.    If you  smoke, please stop smoking.          Viral Pharyngitis (Sore Throat)    You (or your child, if your child is the patient) have pharyngitis (sore throat). This infection is caused by a virus.  It can cause throat pain that is worse when swallowing, aching all over, headache, and fever. The infection may be spread by coughing, kissing, or touching others after touching your mouth or nose. Antibiotic medications do not work against viruses, so they are not used for treating this condition.  Home care  · If your symptoms are severe, rest at home. Return to work or school when you feel well enough.   · Drink plenty of fluids to avoid dehydration.  · For children: Use acetaminophen for fever, fussiness or discomfort. In infants over six months of age, you may use ibuprofen instead of acetaminophen. (NOTE: If your child has chronic liver or kidney disease or ever had a stomach ulcer or GI bleeding, talk with your doctor before using these medicines.) (NOTE: Aspirin should never be used in anyone under 18 years of age who is ill with a fever. It may cause severe liver damage.)   · For adults: You may use acetaminophen or ibuprofen to control pain or fever, unless another medicine was prescribed for this. (NOTE: If you have chronic liver or kidney disease or ever had a stomach ulcer or GI bleeding, talk with your doctor before using these medicines.)  · Throat lozenges or numbing throat sprays can help reduce pain. Gargling with warm salt water will also help reduce throat pain. For this, dissolve 1/2 teaspoon of salt in 1 glass of warm water. To help soothe a sore throat, children can sip on juice or a popsicle. Children 5 years and older can also suck on a lollipop or hard candy.  · Avoid salty or spicy foods, which can be irritating to the throat.  Follow-up care  Follow up with your healthcare provider or our staff if you are not improving over the next week.  When to seek medical advice  Call your healthcare provider right away if any of these occur:  · Fever as directed by your doctor.  For children, seek care if:  ¨ Your child is of any age and has repeated fevers above 104°F (40°C).  ¨ Your child is  younger than 2 years of age and has a fever of 100.4°F (38°C) that continues for more than 1 day.  ¨ Your child is 2 years old or older and has a fever of 100.4°F (38°C) that continues for more than 3 days.  · New or worsening ear pain, sinus pain, or headache  · Painful lumps in the back of neck  · Stiff neck  · Lymph nodes are getting larger  · Inability to swallow liquids, excessive drooling, or inability to open mouth wide due to throat pain  · Signs of dehydration (very dark urine or no urine, sunken eyes, dizziness)  · Trouble breathing or noisy breathing  · Muffled voice  · New rash  · Child appears to be getting sicker  Date Last Reviewed: 4/13/2015  © 7871-4676 [employer]. [address] [place] [address], [place] [address]. All rights reserved. This information is not intended as a substitute for professional medical care. Always follow your healthcare professional's instructions.

## 2019-10-07 NOTE — PROGRESS NOTES
"Subjective:       Patient ID: Juan José Ng is a 39 y.o. male.    Vitals:  height is 6' 4" (1.93 m) and weight is 107 kg (236 lb). His oral temperature is 98.5 °F (36.9 °C). His blood pressure is 118/84 and his pulse is 87. His respiration is 19 and oxygen saturation is 98%.     Chief Complaint: Sore Throat    39 yr old male with sore throat for past 1 day and generalized malaise. Hx of MAC. No recent changes in cough or sputum production. No fevers, N/V/D, abdominal pain.    Sore Throat    This is a new problem. The current episode started yesterday. The problem has been unchanged. There has been no fever. The pain is at a severity of 5/10. The pain is moderate. Associated symptoms include congestion and coughing. Pertinent negatives include no abdominal pain, diarrhea, drooling, ear discharge, ear pain, headaches, hoarse voice, plugged ear sensation, neck pain, shortness of breath, stridor, swollen glands, trouble swallowing or vomiting. He has had no exposure to strep or mono. Treatments tried: dayquil. The treatment provided mild relief.       Constitution: Negative for chills, sweating, fatigue and fever.   HENT: Positive for congestion, postnasal drip and sore throat. Negative for ear pain, ear discharge, drooling, sinus pain, sinus pressure, trouble swallowing and voice change.    Neck: Negative for neck pain and painful lymph nodes.   Eyes: Negative for eye redness.   Respiratory: Positive for cough and sputum production. Negative for chest tightness, bloody sputum, COPD, shortness of breath, stridor, wheezing and asthma.    Gastrointestinal: Negative for abdominal pain, nausea, vomiting and diarrhea.   Musculoskeletal: Negative for muscle ache.   Skin: Negative for rash.   Allergic/Immunologic: Negative for seasonal allergies and asthma.   Neurological: Negative for headaches.   Hematologic/Lymphatic: Negative for swollen lymph nodes.       Objective:      Physical Exam   Constitutional: He is oriented " to person, place, and time. He appears well-developed and well-nourished. He is cooperative.  Non-toxic appearance. He does not appear ill. No distress.   HENT:   Head: Normocephalic and atraumatic.   Right Ear: Hearing, tympanic membrane, external ear and ear canal normal.   Left Ear: Hearing, tympanic membrane, external ear and ear canal normal.   Nose: Nose normal. No mucosal edema, rhinorrhea or nasal deformity. No epistaxis. Right sinus exhibits no maxillary sinus tenderness and no frontal sinus tenderness. Left sinus exhibits no maxillary sinus tenderness and no frontal sinus tenderness.   Mouth/Throat: Uvula is midline, oropharynx is clear and moist and mucous membranes are normal. No trismus in the jaw. Normal dentition. No uvula swelling. No posterior oropharyngeal erythema. Tonsils are 0 on the right. Tonsils are 0 on the left. No tonsillar exudate.   Eyes: Conjunctivae and lids are normal. No scleral icterus.   Sclera clear bilat   Neck: Trachea normal, full passive range of motion without pain and phonation normal. Neck supple.   Cardiovascular: Normal rate, regular rhythm, normal heart sounds, intact distal pulses and normal pulses.   Pulmonary/Chest: Effort normal and breath sounds normal. No respiratory distress.   Abdominal: Soft. Normal appearance and bowel sounds are normal. He exhibits no distension. There is no tenderness.   Musculoskeletal: Normal range of motion. He exhibits no edema or deformity.   Neurological: He is alert and oriented to person, place, and time. He exhibits normal muscle tone. Coordination normal.   Skin: Skin is warm, dry and intact. He is not diaphoretic. No pallor.   Psychiatric: He has a normal mood and affect. His speech is normal and behavior is normal. Judgment and thought content normal. Cognition and memory are normal.   Nursing note and vitals reviewed.      Assessment:       1. Sore throat        Plan:       Sore throat for past 1 day. Hx of MAC. Strict 1 wk  follow up with pulmonology or primary care regarding symptoms.    Sore throat  -     POCT rapid strep A  -     (Magic mouthwash) 1:1:1 Benadryl 12.5mg/5ml liq, aluminum & magnesium hydroxide-simehticone (Maalox), lidocaine viscous 2%; Swish and spit 10 mLs every 4 (four) hours as needed. for mouth sores  Dispense: 200 mL; Refill: 0            Patient Instructions   Follow up with primary doctor or pulmonolgy within 1 week.  Go to ED for any change or worsening of symptoms.           Please drink plenty of fluids.  Please get plenty of rest.  Please return here or go to the Emergency Department for any concerns or worsening of condition.  If you were given wait & see antibiotics, please wait 3-5 days before taking them, and only take them if your symptoms have worsened or not improved.  If you do begin taking the antibiotics, please take them to completion.  If you were prescribed antibiotics, please take them to completion.  If you were prescribed a narcotic medication, do not drive or operate heavy equipment or machinery while taking these medications.  If you do not have Hypertension or any history of palpitations, it is ok to take over the counter Sudafed or Mucinex D or Allegra-D or Claritin-D or Zyrtec-D.  If you do take one of the above, it is ok to combine that with plain over the counter Mucinex or Allegra or Claritin or Zyrtec.  If for example you are taking Zyrtec -D, you can combine that with Mucinex, but not Mucinex-D.  If you are taking Mucinex-D, you can combine that with plain Allegra or Claritin or Zyrtec.   If you do have Hypertension or palpitations, it is safe to take Coricidin HBP for relief of sinus symptoms.  We recommend you take over the counter Flonase (Fluticasone) or another nasally inhaled steroid unless you are already taking one.  Nasal irrigation with a saline spray or Netti Pot like device per their directions is also recommended.  If not allergic, please take over the counter Tylenol  (Acetaminophen) and/or Motrin (Ibuprofen) as directed for control of pain and/or fever.  Please follow up with your primary care doctor or specialist as needed.    If you  smoke, please stop smoking.          Viral Pharyngitis (Sore Throat)    You (or your child, if your child is the patient) have pharyngitis (sore throat). This infection is caused by a virus. It can cause throat pain that is worse when swallowing, aching all over, headache, and fever. The infection may be spread by coughing, kissing, or touching others after touching your mouth or nose. Antibiotic medications do not work against viruses, so they are not used for treating this condition.  Home care  · If your symptoms are severe, rest at home. Return to work or school when you feel well enough.   · Drink plenty of fluids to avoid dehydration.  · For children: Use acetaminophen for fever, fussiness or discomfort. In infants over six months of age, you may use ibuprofen instead of acetaminophen. (NOTE: If your child has chronic liver or kidney disease or ever had a stomach ulcer or GI bleeding, talk with your doctor before using these medicines.) (NOTE: Aspirin should never be used in anyone under 18 years of age who is ill with a fever. It may cause severe liver damage.)   · For adults: You may use acetaminophen or ibuprofen to control pain or fever, unless another medicine was prescribed for this. (NOTE: If you have chronic liver or kidney disease or ever had a stomach ulcer or GI bleeding, talk with your doctor before using these medicines.)  · Throat lozenges or numbing throat sprays can help reduce pain. Gargling with warm salt water will also help reduce throat pain. For this, dissolve 1/2 teaspoon of salt in 1 glass of warm water. To help soothe a sore throat, children can sip on juice or a popsicle. Children 5 years and older can also suck on a lollipop or hard candy.  · Avoid salty or spicy foods, which can be irritating to the  throat.  Follow-up care  Follow up with your healthcare provider or our staff if you are not improving over the next week.  When to seek medical advice  Call your healthcare provider right away if any of these occur:  · Fever as directed by your doctor.  For children, seek care if:  ¨ Your child is of any age and has repeated fevers above 104°F (40°C).  ¨ Your child is younger than 2 years of age and has a fever of 100.4°F (38°C) that continues for more than 1 day.  ¨ Your child is 2 years old or older and has a fever of 100.4°F (38°C) that continues for more than 3 days.  · New or worsening ear pain, sinus pain, or headache  · Painful lumps in the back of neck  · Stiff neck  · Lymph nodes are getting larger  · Inability to swallow liquids, excessive drooling, or inability to open mouth wide due to throat pain  · Signs of dehydration (very dark urine or no urine, sunken eyes, dizziness)  · Trouble breathing or noisy breathing  · Muffled voice  · New rash  · Child appears to be getting sicker  Date Last Reviewed: 4/13/2015  © 3958-9708 [employer]. [address] [place] [address], [place] [address]. All rights reserved. This information is not intended as a substitute for professional medical care. Always follow your healthcare professional's instructions.

## 2019-10-11 ENCOUNTER — TELEPHONE (OUTPATIENT)
Dept: GENETICS | Facility: CLINIC | Age: 39
End: 2019-10-11

## 2019-10-11 NOTE — TELEPHONE ENCOUNTER
Gave Mr. Ng his genetic testing result. He asked about insurance coverage. Will follow-up with [employer] about benefits

## 2019-10-14 ENCOUNTER — TELEPHONE (OUTPATIENT)
Dept: GENETICS | Facility: CLINIC | Age: 39
End: 2019-10-14

## 2019-10-14 NOTE — TELEPHONE ENCOUNTER
Spoke to Mr. Ng to let him know his testing from [facility] was 100% covered by insurance with a $0 OOP expense. He verbalized understanding.

## 2019-11-20 ENCOUNTER — PATIENT OUTREACH (OUTPATIENT)
Dept: ADMINISTRATIVE | Facility: HOSPITAL | Age: 39
End: 2019-11-20

## 2019-12-04 ENCOUNTER — OFFICE VISIT (OUTPATIENT)
Dept: INTERNAL MEDICINE | Facility: CLINIC | Age: 39
End: 2019-12-04
Payer: COMMERCIAL

## 2019-12-04 VITALS
BODY MASS INDEX: 29.85 KG/M2 | WEIGHT: 245.13 LBS | DIASTOLIC BLOOD PRESSURE: 84 MMHG | HEIGHT: 76 IN | HEART RATE: 91 BPM | SYSTOLIC BLOOD PRESSURE: 114 MMHG | OXYGEN SATURATION: 97 %

## 2019-12-04 DIAGNOSIS — R93.89 ABNORMAL CT OF THE CHEST: ICD-10-CM

## 2019-12-04 DIAGNOSIS — A31.0 PULMONARY MYCOBACTERIUM AVIUM COMPLEX (MAC) INFECTION: ICD-10-CM

## 2019-12-04 DIAGNOSIS — Z23 NEED FOR PNEUMOCOCCAL VACCINATION: ICD-10-CM

## 2019-12-04 DIAGNOSIS — Z00.00 VISIT FOR ANNUAL HEALTH EXAMINATION: Primary | ICD-10-CM

## 2019-12-04 DIAGNOSIS — E04.1 THYROID NODULE: ICD-10-CM

## 2019-12-04 DIAGNOSIS — R10.30 LOWER ABDOMINAL PAIN: ICD-10-CM

## 2019-12-04 DIAGNOSIS — J31.0 CHRONIC RHINITIS: ICD-10-CM

## 2019-12-04 DIAGNOSIS — J32.9 RECURRENT SINUSITIS: ICD-10-CM

## 2019-12-04 LAB
BILIRUB UR QL STRIP: NEGATIVE
CLARITY UR REFRACT.AUTO: ABNORMAL
COLOR UR AUTO: YELLOW
GLUCOSE UR QL STRIP: NEGATIVE
HGB UR QL STRIP: NEGATIVE
KETONES UR QL STRIP: NEGATIVE
LEUKOCYTE ESTERASE UR QL STRIP: NEGATIVE
NITRITE UR QL STRIP: NEGATIVE
PH UR STRIP: 5 [PH] (ref 5–8)
PROT UR QL STRIP: NEGATIVE
SP GR UR STRIP: 1.02 (ref 1–1.03)
URN SPEC COLLECT METH UR: ABNORMAL

## 2019-12-04 PROCEDURE — 99999 PR PBB SHADOW E&M-EST. PATIENT-LVL III: ICD-10-PCS | Mod: PBBFAC,,, | Performed by: INTERNAL MEDICINE

## 2019-12-04 PROCEDURE — 87491 CHLMYD TRACH DNA AMP PROBE: CPT

## 2019-12-04 PROCEDURE — 81003 URINALYSIS AUTO W/O SCOPE: CPT

## 2019-12-04 PROCEDURE — 90670 PNEUMOCOCCAL CONJUGATE VACCINE 13-VALENT LESS THAN 5YO & GREATER THAN: ICD-10-PCS | Mod: S$GLB,,, | Performed by: INTERNAL MEDICINE

## 2019-12-04 PROCEDURE — 90670 PCV13 VACCINE IM: CPT | Mod: S$GLB,,, | Performed by: INTERNAL MEDICINE

## 2019-12-04 PROCEDURE — 90471 PNEUMOCOCCAL CONJUGATE VACCINE 13-VALENT LESS THAN 5YO & GREATER THAN: ICD-10-PCS | Mod: S$GLB,,, | Performed by: INTERNAL MEDICINE

## 2019-12-04 PROCEDURE — 99999 PR PBB SHADOW E&M-EST. PATIENT-LVL III: CPT | Mod: PBBFAC,,, | Performed by: INTERNAL MEDICINE

## 2019-12-04 PROCEDURE — 99396 PR PREVENTIVE VISIT,EST,40-64: ICD-10-PCS | Mod: 25,S$GLB,, | Performed by: INTERNAL MEDICINE

## 2019-12-04 PROCEDURE — 90471 IMMUNIZATION ADMIN: CPT | Mod: S$GLB,,, | Performed by: INTERNAL MEDICINE

## 2019-12-04 PROCEDURE — 99396 PREV VISIT EST AGE 40-64: CPT | Mod: 25,S$GLB,, | Performed by: INTERNAL MEDICINE

## 2019-12-04 PROCEDURE — 87086 URINE CULTURE/COLONY COUNT: CPT

## 2019-12-04 NOTE — PROGRESS NOTES
INTERNAL MEDICINE ESTABLISHED PATIENT VISIT NOTE    Subjective:     Chief Complaint: Annual Exam; Abdominal Pain; and Testicle Pain       Patient ID: Juan José Ng is a 39 y.o. male with chronic rhinitis (see last note for details, has had sinus surgery in the past and followed by ID, allergy, and pulm in the past c neg immunodeficiency w/u, normal PFTs, normal CT sinuses/scope), depression (failed tx c cymbalta, lexapro, zoloft 2/2 inefficacy or s/e), last seen by me 7/2018, here today for annual exam.    Today also c several complaints including:   abd pain.  Sx both started around 2-3 weeks ago described as a lower abd dull discomfort.  Has also had some fecal urgency and says stools are softer than usual c a dark orange/brown color.      Says he always feels a discomfort in his testicular area when he has this abd discomfort so unsure if it is referred type pain.  Denies tenderness or pain in actual scrotum.  Denies dysuria or urethral discharge.  Is  c no changes in sexual partner.    Chronic rhinitis and cough have been followed over the past year by Dr. Payne/pulm.  To review, had normal PFTs in the past and did not tolerate flonase.  Was previously seeing ENT who rec Budesonide washes which pt states helped a little but that sx would return after about 30 min.      Had CT chest done which showed changes c/w inflammation vs infection.  KASIE ordered by me at that time was neg and then had sputum cx done by pulm which showed +MAC.  Was then seen by ID who did not rec tx yet as unsure if colonization.  Was then referred to rheum who did extensive w/u which has thus far been negative.  Also had genetic testing done, results of which are still pending.    Of note, incidentally on CT chest, pt also noted to have what appeared to be a thyroid nodule which was stable on subsequent CT.     Past Medical History:  Past Medical History:   Diagnosis Date    Allergy     Anxiety     Depression     Hx of  "psychiatric care     Major depression, chronic 7/6/2017    Psychiatric problem     Recurrent upper respiratory infection (URI)     Therapy        Home Medications:  Prior to Admission medications    Not on File       Allergies:  Review of patient's allergies indicates:  No Known Allergies    Social History:  Social History     Tobacco Use    Smoking status: Never Smoker    Smokeless tobacco: Never Used   Substance Use Topics    Alcohol use: No    Drug use: No        Review of Systems   Constitutional: Negative for appetite change, chills, fatigue, fever and unexpected weight change.   HENT: Positive for postnasal drip, rhinorrhea and sinus pressure. Negative for congestion and hearing loss.    Eyes: Negative for pain and visual disturbance.   Respiratory: Positive for cough. Negative for chest tightness, shortness of breath and wheezing.    Cardiovascular: Negative for chest pain, palpitations and leg swelling.   Gastrointestinal: Negative for abdominal distention and abdominal pain.   Endocrine: Negative for polydipsia and polyuria.   Genitourinary: Negative for decreased urine volume, discharge, dysuria and frequency.   Neurological: Negative for dizziness, weakness, numbness and headaches.   Psychiatric/Behavioral: Negative for behavioral problems and confusion.         Health Maintenance:     Immunizations:   Influenza 11/2019  TDap 6/2017  Pneumovax 2015 per pt, will give prevnar now given chronic pulm issues.     Cancer Screening:  [facility] rec at 50    Objective:   /84 (BP Location: Left arm, Patient Position: Sitting, BP Method: Large (Manual))   Pulse 91   Ht 6' 4" (1.93 m)   Wt 111.2 kg (245 lb 2.4 oz)   SpO2 97%   BMI 29.84 kg/m²        General: AAO x3, no apparent distress  HEENT: PERRL, OP clear  CV: RRR, no m/r/g  Pulm: Lungs CTAB, no crackles, no wheezes  Abd: s/NT/ND +BS  Extremities: no c/c/e  : testicular exam benign c no TTP and no masses appreciated.    Labs:     Lab Results "   Component Value Date    WBC 4.37 12/04/2019    HGB 14.0 12/04/2019    HCT 41.0 12/04/2019    MCV 87 12/04/2019     (L) 12/04/2019     Sodium   Date Value Ref Range Status   12/04/2019 139 136 - 145 mmol/L Final     Potassium   Date Value Ref Range Status   12/04/2019 4.1 3.5 - 5.1 mmol/L Final     Chloride   Date Value Ref Range Status   12/04/2019 103 95 - 110 mmol/L Final     CO2   Date Value Ref Range Status   12/04/2019 27 23 - 29 mmol/L Final     Glucose   Date Value Ref Range Status   12/04/2019 90 70 - 110 mg/dL Final     BUN, Bld   Date Value Ref Range Status   12/04/2019 12 6 - 20 mg/dL Final     Creatinine   Date Value Ref Range Status   12/04/2019 1.0 0.5 - 1.4 mg/dL Final     Calcium   Date Value Ref Range Status   12/04/2019 9.4 8.7 - 10.5 mg/dL Final     Total Protein   Date Value Ref Range Status   12/04/2019 7.3 6.0 - 8.4 g/dL Final     Albumin   Date Value Ref Range Status   12/04/2019 4.2 3.5 - 5.2 g/dL Final     Total Bilirubin   Date Value Ref Range Status   12/04/2019 1.2 (H) 0.1 - 1.0 mg/dL Final     Comment:     For infants and newborns, interpretation of results should be based  on gestational age, weight and in agreement with clinical  observations.  Premature Infant recommended reference ranges:  Up to 24 hours.............<8.0 mg/dL  Up to 48 hours............<12.0 mg/dL  3-5 days..................<15.0 mg/dL  6-29 days.................<15.0 mg/dL       Alkaline Phosphatase   Date Value Ref Range Status   12/04/2019 80 55 - 135 U/L Final     AST   Date Value Ref Range Status   12/04/2019 57 (H) 10 - 40 U/L Final     ALT   Date Value Ref Range Status   12/04/2019 90 (H) 10 - 44 U/L Final     Anion Gap   Date Value Ref Range Status   12/04/2019 9 8 - 16 mmol/L Final     eGFR if    Date Value Ref Range Status   12/04/2019 >60.0 >60 mL/min/1.73 m^2 Final     eGFR if non    Date Value Ref Range Status   12/04/2019 >60.0 >60 mL/min/1.73 m^2 Final      Comment:     Calculation used to obtain the estimated glomerular filtration  rate (eGFR) is the CKD-EPI equation.        Lab Results   Component Value Date    HGBA1C 5.0 12/04/2019     Lab Results   Component Value Date    LDLCALC 140.4 12/04/2019     Lab Results   Component Value Date    TSH 1.550 12/04/2019         Assessment/Plan     Juan José was seen today for annual exam, abdominal pain and testicle pain.    Diagnoses and all orders for this visit:    Visit for annual health examination  History and physical exam completed.  Health maintenance reviewed as above.  -     Hemoglobin A1c; Future  -     Lipid panel; Future    Recurrent sinusitis  Chronic rhinitis  Abnormal CT of the chest  Pulmonary Mycobacterium avium complex (MAC) infection  As per HPI  Awaiting final results of genetic testing and Rheum w/u.  Thus far unremarkable w/u via pulm other than MAC+ sputum cx which is not yet being treated as unsure if colonizer.  Advised to f/u c rheum and pulm pending initial results.    Lower abdominal pain  Unclear etiology  Exam benign today, will check urine studies and basic albs.  -     Urine culture  -     CBC auto differential; Future  -     Comprehensive metabolic panel; Future  -     Urinalysis  -     C. trachomatis/N. gonorrhoeae by AMP DNA [facility]; Urine    Thyroid nodule  Noted on CT chest, will check us and TFTs  -     TSH; Future  -     US Soft Tissue Head Neck Thyroid; Future    Need for pneumococcal vaccination  -     (In Office Administered) Pneumococcal Conjugate Vaccine (13 Valent) (IM)    HM as above  RTC in 1 year, sooner if needed.  Cont f/u c Pulm    Lina Silva MD  Department of Internal Medicine - [facility]  12/05/2019

## 2019-12-05 ENCOUNTER — TELEPHONE (OUTPATIENT)
Dept: INTERNAL MEDICINE | Facility: CLINIC | Age: 39
End: 2019-12-05

## 2019-12-05 DIAGNOSIS — R79.89 ELEVATED LFTS: Primary | ICD-10-CM

## 2019-12-05 LAB
C TRACH DNA SPEC QL NAA+PROBE: NOT DETECTED
N GONORRHOEA DNA SPEC QL NAA+PROBE: NOT DETECTED

## 2019-12-06 ENCOUNTER — TELEPHONE (OUTPATIENT)
Dept: INTERNAL MEDICINE | Facility: CLINIC | Age: 39
End: 2019-12-06

## 2019-12-06 ENCOUNTER — HOSPITAL ENCOUNTER (OUTPATIENT)
Dept: RADIOLOGY | Facility: HOSPITAL | Age: 39
Discharge: HOME | End: 2019-12-06
Attending: INTERNAL MEDICINE
Payer: COMMERCIAL

## 2019-12-06 DIAGNOSIS — E04.1 THYROID NODULE: ICD-10-CM

## 2019-12-06 DIAGNOSIS — E04.2 MULTIPLE THYROID NODULES: Primary | ICD-10-CM

## 2019-12-06 LAB — BACTERIA UR CULT: NO GROWTH

## 2019-12-06 PROCEDURE — 76536 US EXAM OF HEAD AND NECK: CPT | Mod: 26,,, | Performed by: RADIOLOGY

## 2019-12-06 PROCEDURE — 76536 US SOFT TISSUE HEAD NECK THYROID: ICD-10-PCS | Mod: 26,,, | Performed by: RADIOLOGY

## 2019-12-06 PROCEDURE — 76536 US EXAM OF HEAD AND NECK: CPT | Mod: TC

## 2019-12-06 NOTE — TELEPHONE ENCOUNTER
Pt notified of thyroid u/s results.  Reports mom had hx concerning nodules that required surgery/removal but unsure of details.  Will plan for repeat u/s in 6 mos, not req FNA at this time.

## 2019-12-14 ENCOUNTER — PATIENT MESSAGE (OUTPATIENT)
Dept: GENETICS | Facility: CLINIC | Age: 39
End: 2019-12-14

## 2019-12-16 ENCOUNTER — LAB VISIT (OUTPATIENT)
Dept: LAB | Facility: HOSPITAL | Age: 39
End: 2019-12-16
Attending: INTERNAL MEDICINE
Payer: COMMERCIAL

## 2019-12-16 DIAGNOSIS — R79.89 ELEVATED LFTS: ICD-10-CM

## 2019-12-16 LAB
ALBUMIN SERPL BCP-MCNC: 4.2 G/DL (ref 3.5–5.2)
ALP SERPL-CCNC: 73 U/L (ref 55–135)
ALT SERPL W/O P-5'-P-CCNC: 31 U/L (ref 10–44)
ANION GAP SERPL CALC-SCNC: 7 MMOL/L (ref 8–16)
AST SERPL-CCNC: 22 U/L (ref 10–40)
BILIRUB SERPL-MCNC: 1.1 MG/DL (ref 0.1–1)
BUN SERPL-MCNC: 12 MG/DL (ref 6–20)
CALCIUM SERPL-MCNC: 9.4 MG/DL (ref 8.7–10.5)
CHLORIDE SERPL-SCNC: 103 MMOL/L (ref 95–110)
CO2 SERPL-SCNC: 30 MMOL/L (ref 23–29)
CREAT SERPL-MCNC: 1 MG/DL (ref 0.5–1.4)
EST. GFR  (AFRICAN AMERICAN): >60 ML/MIN/1.73 M^2
EST. GFR  (NON AFRICAN AMERICAN): >60 ML/MIN/1.73 M^2
GLUCOSE SERPL-MCNC: 83 MG/DL (ref 70–110)
POTASSIUM SERPL-SCNC: 3.9 MMOL/L (ref 3.5–5.1)
PROT SERPL-MCNC: 7.4 G/DL (ref 6–8.4)
SODIUM SERPL-SCNC: 140 MMOL/L (ref 136–145)

## 2019-12-16 PROCEDURE — 36415 COLL VENOUS BLD VENIPUNCTURE: CPT

## 2019-12-16 PROCEDURE — 80053 COMPREHEN METABOLIC PANEL: CPT

## 2020-06-05 ENCOUNTER — PATIENT MESSAGE (OUTPATIENT)
Dept: INTERNAL MEDICINE | Facility: CLINIC | Age: 40
End: 2020-06-05

## 2020-06-08 ENCOUNTER — HOSPITAL ENCOUNTER (OUTPATIENT)
Dept: RADIOLOGY | Facility: HOSPITAL | Age: 40
Discharge: HOME | End: 2020-06-08
Attending: INTERNAL MEDICINE
Payer: COMMERCIAL

## 2020-06-08 DIAGNOSIS — E04.2 MULTIPLE THYROID NODULES: ICD-10-CM

## 2020-06-08 PROCEDURE — 76536 US EXAM OF HEAD AND NECK: CPT | Mod: TC

## 2020-06-08 PROCEDURE — 76536 US SOFT TISSUE HEAD NECK THYROID: ICD-10-PCS | Mod: 26,,, | Performed by: RADIOLOGY

## 2020-06-08 PROCEDURE — 76536 US EXAM OF HEAD AND NECK: CPT | Mod: 26,,, | Performed by: RADIOLOGY

## 2020-06-30 ENCOUNTER — OFFICE VISIT (OUTPATIENT)
Dept: PSYCHIATRY | Facility: CLINIC | Age: 40
End: 2020-06-30
Payer: COMMERCIAL

## 2020-06-30 DIAGNOSIS — F32.9 MAJOR DEPRESSION, CHRONIC: Primary | ICD-10-CM

## 2020-06-30 PROCEDURE — 90832 PR PSYCHOTHERAPY W/PATIENT, 30 MIN: ICD-10-PCS | Mod: S$GLB,,, | Performed by: SOCIAL WORKER

## 2020-06-30 PROCEDURE — 99999 PR PBB SHADOW E&M-EST. PATIENT-LVL II: ICD-10-PCS | Mod: PBBFAC,,, | Performed by: SOCIAL WORKER

## 2020-06-30 PROCEDURE — 90832 PSYTX W PT 30 MINUTES: CPT | Mod: S$GLB,,, | Performed by: SOCIAL WORKER

## 2020-06-30 PROCEDURE — 99999 PR PBB SHADOW E&M-EST. PATIENT-LVL II: CPT | Mod: PBBFAC,,, | Performed by: SOCIAL WORKER

## 2020-07-01 NOTE — PROGRESS NOTES
Individual Psychotherapy (PhD/LCSW)    6/30/2020    Site:  [facility]         Therapeutic Intervention: Met with patient.  Outpatient - Insight oriented psychotherapy 30 min - CPT code 84710    Chief complaint/reason for encounter: depression, mood swings, anxiety, sleep, appetite, behavior, cognition, somatic and interpersonal     Interval history and content of current session: returning client to find ways to decrease depression, interested in TMS and I will find if we do that and if not who does, and also let him know, and also may want to do group therapy, many years and symptoms of depression, meds have not helped very much, likes his job, likes his marriage, and is financially doing okay, and feels bad all the time, and also has some lung concerns, medically. Coping skills, and I will get back with him soon on information.    Treatment plan:  · Target symptoms: depression, recurrent depression, anxiety , mood swings, mood disorder, adjustment  · Why chosen therapy is appropriate versus another modality: relevant to diagnosis, patient responds to this modality, evidence based practice  · Outcome monitoring methods: self-report, observation  · Therapeutic intervention type: insight oriented psychotherapy, behavior modifying psychotherapy, supportive psychotherapy    Risk parameters:  Patient reports no suicidal ideation  Patient reports no homicidal ideation  Patient reports no self-injurious behavior  Patient reports no violent behavior    Verbal deficits: None    Patient's response to intervention:  The patient's response to intervention is accepting.    Progress toward goals and other mental status changes:  The patient's progress toward goals is limited.    Diagnosis:     ICD-10-CM ICD-9-CM   1. Major depression, chronic  F32.9 296.20       Plan:  individual psychotherapy, group psychotherapy, consult psychiatrist for medication evaluation and medication management by physician    Return to clinic: 2  weeks    Length of Service (minutes): 30

## 2020-09-21 ENCOUNTER — OFFICE VISIT (OUTPATIENT)
Dept: URGENT CARE | Facility: CLINIC | Age: 40
End: 2020-09-21
Payer: COMMERCIAL

## 2020-09-21 VITALS
BODY MASS INDEX: 29.83 KG/M2 | TEMPERATURE: 98 F | HEART RATE: 100 BPM | SYSTOLIC BLOOD PRESSURE: 146 MMHG | OXYGEN SATURATION: 96 % | DIASTOLIC BLOOD PRESSURE: 96 MMHG | HEIGHT: 76 IN | WEIGHT: 245 LBS

## 2020-09-21 DIAGNOSIS — M54.9 DORSALGIA, UNSPECIFIED: Primary | ICD-10-CM

## 2020-09-21 LAB
BILIRUB UR QL STRIP: NEGATIVE
GLUCOSE UR QL STRIP: NEGATIVE
KETONES UR QL STRIP: NEGATIVE
LEUKOCYTE ESTERASE UR QL STRIP: NEGATIVE
PH, POC UA: 5 (ref 5–8)
POC BLOOD, URINE: NEGATIVE
POC NITRATES, URINE: NEGATIVE
PROT UR QL STRIP: NEGATIVE
SP GR UR STRIP: 1 (ref 1–1.03)
UROBILINOGEN UR STRIP-ACNC: NORMAL (ref 0.3–2.2)

## 2020-09-21 PROCEDURE — 81003 POCT URINALYSIS, DIPSTICK, AUTOMATED, W/O SCOPE: ICD-10-PCS | Mod: QW,S$GLB,, | Performed by: NURSE PRACTITIONER

## 2020-09-21 PROCEDURE — 99214 OFFICE O/P EST MOD 30 MIN: CPT | Mod: 25,S$GLB,, | Performed by: NURSE PRACTITIONER

## 2020-09-21 PROCEDURE — 72100 X-RAY EXAM L-S SPINE 2/3 VWS: CPT | Mod: FY,S$GLB,, | Performed by: INTERNAL MEDICINE

## 2020-09-21 PROCEDURE — 99214 PR OFFICE/OUTPT VISIT, EST, LEVL IV, 30-39 MIN: ICD-10-PCS | Mod: 25,S$GLB,, | Performed by: NURSE PRACTITIONER

## 2020-09-21 PROCEDURE — 81003 URINALYSIS AUTO W/O SCOPE: CPT | Mod: QW,S$GLB,, | Performed by: NURSE PRACTITIONER

## 2020-09-21 PROCEDURE — 96372 PR INJECTION,THERAP/PROPH/DIAG2ST, IM OR SUBCUT: ICD-10-PCS | Mod: S$GLB,,, | Performed by: FAMILY MEDICINE

## 2020-09-21 PROCEDURE — 96372 THER/PROPH/DIAG INJ SC/IM: CPT | Mod: S$GLB,,, | Performed by: FAMILY MEDICINE

## 2020-09-21 PROCEDURE — 72100 XR LUMBAR SPINE 2 OR 3 VIEWS: ICD-10-PCS | Mod: FY,S$GLB,, | Performed by: INTERNAL MEDICINE

## 2020-09-21 RX ORDER — DICLOFENAC SODIUM 10 MG/G
2 GEL TOPICAL 4 TIMES DAILY
Qty: 50 G | Refills: 0 | Status: SHIPPED | OUTPATIENT
Start: 2020-09-21 | End: 2021-02-07

## 2020-09-21 RX ORDER — KETOROLAC TROMETHAMINE 30 MG/ML
60 INJECTION, SOLUTION INTRAMUSCULAR; INTRAVENOUS
Status: COMPLETED | OUTPATIENT
Start: 2020-09-21 | End: 2020-09-21

## 2020-09-21 RX ORDER — IBUPROFEN 800 MG/1
800 TABLET ORAL 3 TIMES DAILY
Qty: 30 TABLET | Refills: 0 | Status: SHIPPED | OUTPATIENT
Start: 2020-09-21 | End: 2021-02-07

## 2020-09-21 RX ADMIN — KETOROLAC TROMETHAMINE 60 MG: 30 INJECTION, SOLUTION INTRAMUSCULAR; INTRAVENOUS at 05:09

## 2020-09-21 NOTE — PATIENT INSTRUCTIONS
Orthopedic Follow up Discharge Instructions    If your condition worsens or fails to improve we recommend that you receive another evaluation at the ER immediately or contact your PCP to discuss your concerns or return here. You must understand that you've received an urgent care treatment only and that you may be released before all your medical problems are known or treated. You the patient will arrange for follouwp care as instructed.   If you were prescribed a narcotic or muscle relaxant do not drive or operate heavy machinery while taking these medications   You were given Toradol 60mg IM injection on today.  DO NOT START TAKING YOUR IBUPROFEN until tomorrow, 9/22/2020  Since you were given a prescription NSAID here do not also take any over the counter NSAID like ibuprofen, aleve, advil, motrin etc   RICE which means rest, ice compression and elevation are helpful.   If you have Low Back Pain and develop bowel or bladder symptoms or increase pain going down your legs go to the ER immediately.   If you were given a splint wear it at all times.   If you were given crutches use them as we instructed. Do not rest your armpits on the foam pad or you risk compressing the nerves and the vessels there   Follow up with the orthopedist in 1 week if you continue with pain.   Sometimes it can take up to 1 week for stress fractures to show up on an X-ray, and may need reimaging or a CT or MRI of the affected area.

## 2020-09-21 NOTE — PROGRESS NOTES
"Subjective:       Patient ID: Juan José Ng is a 39 y.o. male.    Vitals:  height is 6' 4" (1.93 m) and weight is 111.1 kg (245 lb). His temperature is 97.7 °F (36.5 °C). His blood pressure is 146/96 (abnormal) and his pulse is 100. His oxygen saturation is 96%.     Chief Complaint: Back Pain    Pt states that he began experiencing back pain approximately 9 days ago. Pt states that he does not remember injuring it. Pt advises that it is painful to rotate, bend, and twist.     Back Pain  This is a new problem. The current episode started 1 to 4 weeks ago (sunday of previous week). The problem occurs constantly. The problem has been waxing and waning since onset. The pain is present in the lumbar spine and sacro-iliac. The quality of the pain is described as aching. The pain does not radiate. The pain is at a severity of 3/10. The pain is mild. The pain is the same all the time. The symptoms are aggravated by bending and twisting. Pertinent negatives include no abdominal pain, bladder incontinence, bowel incontinence, chest pain, dysuria, fever, headaches, leg pain, numbness, paresis, paresthesias, pelvic pain, perianal numbness, tingling, weakness or weight loss. He has tried nothing for the symptoms.       Constitution: Negative for fatigue and fever.   Cardiovascular: Negative for chest pain.   Gastrointestinal: Negative for abdominal pain and bowel incontinence.   Genitourinary: Negative for dysuria, urgency, bladder incontinence, hematuria and pelvic pain.   Musculoskeletal: Positive for back pain. Negative for muscle cramps and history of spine disorder.   Skin: Negative for rash.   Neurological: Negative for coordination disturbances, headaches, numbness and tingling.       Objective:      Physical Exam   Constitutional: He is oriented to person, place, and time. He appears well-developed. He is cooperative. No distress.   HENT:   Head: Normocephalic and atraumatic.   Nose: Nose normal.   Mouth/Throat: " Oropharynx is clear and moist and mucous membranes are normal.   Eyes: Conjunctivae and lids are normal.   Neck: Trachea normal, normal range of motion, full passive range of motion without pain and phonation normal. Neck supple.   Cardiovascular: Normal rate, regular rhythm, normal heart sounds and normal pulses.   Pulmonary/Chest: Effort normal and breath sounds normal.   Abdominal: Soft. Normal appearance and bowel sounds are normal. He exhibits no abdominal bruit, no pulsatile midline mass and no mass. There is no abdominal tenderness. There is no left CVA tenderness and no right CVA tenderness.      Comments: NO CVA TENDERNESS    Musculoskeletal:         General: No deformity.      Lumbar back: He exhibits pain.        Back:         Arms:    Neurological: He is alert and oriented to person, place, and time. He has normal strength and normal reflexes. No sensory deficit.   Skin: Skin is warm, dry, intact and not diaphoretic. Psychiatric: His speech is normal and behavior is normal. Judgment and thought content normal.   Nursing note and vitals reviewed.        Results for orders placed or performed in visit on 09/21/20   POCT Urinalysis, Dipstick, Automated, W/O Scope   Result Value Ref Range    POC Blood, Urine Negative Negative    POC Bilirubin, Urine Negative Negative    POC Urobilinogen, Urine normal 0.3 - 2.2    POC Ketones, Urine Negative Negative    POC Protein, Urine Negative Negative    POC Nitrates, Urine Negative Negative    POC Glucose, Urine Negative Negative    pH, UA 5.0 5 - 8    POC Specific Gravity, Urine 1.005 1.003 - 1.029    POC Leukocytes, Urine Negative Negative   Xr Lumbar Spine 2 Or 3 Views    Result Date: 9/21/2020  EXAMINATION: XR LUMBAR SPINE 2 OR 3 VIEWS CLINICAL HISTORY: Back pain or radiculopathy, < 6 wks, uncomplicated;  Dorsalgia, unspecified TECHNIQUE: Three views COMPARISON: None FINDINGS: The vertebral bodies are normally aligned and normal in height.  The disc spaces are  maintained and there is no significant osteophytic spurring.     No finding to correlate with the given clinical history. Electronically signed by: Carmina Flood MD Date:    09/21/2020 Time:    16:56  Assessment:       1. Dorsalgia, unspecified        Plan:     XRAY NEGATIVE FOR FRACTURE OR ABNORMALITIES.   SEEMS TO BE MUSCULAR PAIN FROM MOVING BOXES. NO NEUROLOGICAL DEFICITS AND NO BOWEL OR BLADDER INVOLVEMENT.   NEGATIVE UA  Dorsalgia, unspecified  -     POCT Urinalysis, Dipstick, Automated, W/O Scope  -     XR LUMBAR SPINE 2 OR 3 VIEWS; Future; Expected date: 09/21/2020  -     ketorolac injection 60 mg  -     ibuprofen (ADVIL,MOTRIN) 800 MG tablet; Take 1 tablet (800 mg total) by mouth 3 (three) times daily.  Dispense: 30 tablet; Refill: 0  -     diclofenac sodium (VOLTAREN) 1 % Gel; Apply 2 g topically 4 (four) times daily.  Dispense: 50 g; Refill: 0         Patient Instructions   Orthopedic Follow up Discharge Instructions    If your condition worsens or fails to improve we recommend that you receive another evaluation at the ER immediately or contact your PCP to discuss your concerns or return here. You must understand that you've received an urgent care treatment only and that you may be released before all your medical problems are known or treated. You the patient will arrange for follouw care as instructed.   If you were prescribed a narcotic or muscle relaxant do not drive or operate heavy machinery while taking these medications   You were given Toradol 60mg IM injection on today.  DO NOT START TAKING YOUR IBUPROFEN until tomorrow, 9/22/2020  Since you were given a prescription NSAID here do not also take any over the counter NSAID like ibuprofen, aleve, advil, motrin etc   RICE which means rest, ice compression and elevation are helpful.   If you have Low Back Pain and develop bowel or bladder symptoms or increase pain going down your legs go to the ER immediately.   If you were given a splint wear  it at all times.   If you were given crutches use them as we instructed. Do not rest your armpits on the foam pad or you risk compressing the nerves and the vessels there   Follow up with the orthopedist in 1 week if you continue with pain.   Sometimes it can take up to 1 week for stress fractures to show up on an X-ray, and may need reimaging or a CT or MRI of the affected area.

## 2021-01-08 ENCOUNTER — OFFICE VISIT (OUTPATIENT)
Dept: URGENT CARE | Facility: CLINIC | Age: 41
End: 2021-01-08
Payer: COMMERCIAL

## 2021-01-08 VITALS
DIASTOLIC BLOOD PRESSURE: 91 MMHG | SYSTOLIC BLOOD PRESSURE: 124 MMHG | HEIGHT: 76 IN | OXYGEN SATURATION: 97 % | BODY MASS INDEX: 30.44 KG/M2 | RESPIRATION RATE: 14 BRPM | HEART RATE: 111 BPM | WEIGHT: 250 LBS | TEMPERATURE: 99 F

## 2021-01-08 DIAGNOSIS — R50.9 FEVER, UNSPECIFIED FEVER CAUSE: Primary | ICD-10-CM

## 2021-01-08 DIAGNOSIS — B34.9 VIRAL SYNDROME: ICD-10-CM

## 2021-01-08 LAB
CTP QC/QA: YES
SARS-COV-2 RDRP RESP QL NAA+PROBE: NEGATIVE

## 2021-01-08 PROCEDURE — 3008F PR BODY MASS INDEX (BMI) DOCUMENTED: ICD-10-PCS | Mod: CPTII,S$GLB,, | Performed by: NURSE PRACTITIONER

## 2021-01-08 PROCEDURE — U0002: ICD-10-PCS | Mod: QW,S$GLB,, | Performed by: NURSE PRACTITIONER

## 2021-01-08 PROCEDURE — 99214 PR OFFICE/OUTPT VISIT, EST, LEVL IV, 30-39 MIN: ICD-10-PCS | Mod: S$GLB,,, | Performed by: NURSE PRACTITIONER

## 2021-01-08 PROCEDURE — 3008F BODY MASS INDEX DOCD: CPT | Mod: CPTII,S$GLB,, | Performed by: NURSE PRACTITIONER

## 2021-01-08 PROCEDURE — U0002 COVID-19 LAB TEST NON-CDC: HCPCS | Mod: QW,S$GLB,, | Performed by: NURSE PRACTITIONER

## 2021-01-08 PROCEDURE — 99214 OFFICE O/P EST MOD 30 MIN: CPT | Mod: S$GLB,,, | Performed by: NURSE PRACTITIONER

## 2021-02-08 ENCOUNTER — IMMUNIZATION (OUTPATIENT)
Dept: PHARMACY | Facility: CLINIC | Age: 41
End: 2021-02-08
Payer: COMMERCIAL

## 2021-02-08 ENCOUNTER — OFFICE VISIT (OUTPATIENT)
Dept: INTERNAL MEDICINE | Facility: CLINIC | Age: 41
End: 2021-02-08
Payer: COMMERCIAL

## 2021-02-08 VITALS
SYSTOLIC BLOOD PRESSURE: 126 MMHG | OXYGEN SATURATION: 97 % | HEIGHT: 76 IN | DIASTOLIC BLOOD PRESSURE: 82 MMHG | WEIGHT: 254.19 LBS | BODY MASS INDEX: 30.95 KG/M2 | HEART RATE: 89 BPM

## 2021-02-08 DIAGNOSIS — R53.82 CHRONIC FATIGUE: ICD-10-CM

## 2021-02-08 DIAGNOSIS — R68.82 DECREASED SEX DRIVE: ICD-10-CM

## 2021-02-08 DIAGNOSIS — A31.0 PULMONARY MYCOBACTERIUM AVIUM COMPLEX (MAC) INFECTION: ICD-10-CM

## 2021-02-08 DIAGNOSIS — Z00.00 VISIT FOR ANNUAL HEALTH EXAMINATION: Primary | ICD-10-CM

## 2021-02-08 DIAGNOSIS — G47.33 OSA (OBSTRUCTIVE SLEEP APNEA): ICD-10-CM

## 2021-02-08 DIAGNOSIS — F32.9 MAJOR DEPRESSION, CHRONIC: ICD-10-CM

## 2021-02-08 DIAGNOSIS — D84.9 IMMUNODEFICIENCY: ICD-10-CM

## 2021-02-08 DIAGNOSIS — R93.89 ABNORMAL CT OF THE CHEST: ICD-10-CM

## 2021-02-08 DIAGNOSIS — J32.9 RECURRENT SINUSITIS: ICD-10-CM

## 2021-02-08 DIAGNOSIS — E04.1 THYROID NODULE: ICD-10-CM

## 2021-02-08 DIAGNOSIS — F51.01 PRIMARY INSOMNIA: ICD-10-CM

## 2021-02-08 PROCEDURE — 3008F PR BODY MASS INDEX (BMI) DOCUMENTED: ICD-10-PCS | Mod: CPTII,S$GLB,, | Performed by: INTERNAL MEDICINE

## 2021-02-08 PROCEDURE — 99999 PR PBB SHADOW E&M-EST. PATIENT-LVL V: ICD-10-PCS | Mod: PBBFAC,,, | Performed by: INTERNAL MEDICINE

## 2021-02-08 PROCEDURE — 99396 PR PREVENTIVE VISIT,EST,40-64: ICD-10-PCS | Mod: S$GLB,,, | Performed by: INTERNAL MEDICINE

## 2021-02-08 PROCEDURE — 99396 PREV VISIT EST AGE 40-64: CPT | Mod: S$GLB,,, | Performed by: INTERNAL MEDICINE

## 2021-02-08 PROCEDURE — 99999 PR PBB SHADOW E&M-EST. PATIENT-LVL V: CPT | Mod: PBBFAC,,, | Performed by: INTERNAL MEDICINE

## 2021-02-08 PROCEDURE — 1126F AMNT PAIN NOTED NONE PRSNT: CPT | Mod: S$GLB,,, | Performed by: INTERNAL MEDICINE

## 2021-02-08 PROCEDURE — 1126F PR PAIN SEVERITY QUANTIFIED, NO PAIN PRESENT: ICD-10-PCS | Mod: S$GLB,,, | Performed by: INTERNAL MEDICINE

## 2021-02-08 PROCEDURE — 3008F BODY MASS INDEX DOCD: CPT | Mod: CPTII,S$GLB,, | Performed by: INTERNAL MEDICINE

## 2021-02-08 RX ORDER — HYDROXYZINE HYDROCHLORIDE 25 MG/1
25 TABLET, FILM COATED ORAL NIGHTLY PRN
Qty: 30 TABLET | Refills: 3 | Status: SHIPPED | OUTPATIENT
Start: 2021-02-08 | End: 2022-03-21

## 2021-02-09 ENCOUNTER — LAB VISIT (OUTPATIENT)
Dept: LAB | Facility: HOSPITAL | Age: 41
End: 2021-02-09
Attending: INTERNAL MEDICINE
Payer: COMMERCIAL

## 2021-02-09 DIAGNOSIS — Z00.00 VISIT FOR ANNUAL HEALTH EXAMINATION: ICD-10-CM

## 2021-02-09 DIAGNOSIS — R68.82 DECREASED SEX DRIVE: ICD-10-CM

## 2021-02-09 DIAGNOSIS — R53.82 CHRONIC FATIGUE: ICD-10-CM

## 2021-02-09 LAB
ALBUMIN SERPL BCP-MCNC: 4 G/DL (ref 3.5–5.2)
ALP SERPL-CCNC: 75 U/L (ref 55–135)
ALT SERPL W/O P-5'-P-CCNC: 27 U/L (ref 10–44)
ANION GAP SERPL CALC-SCNC: 9 MMOL/L (ref 8–16)
AST SERPL-CCNC: 22 U/L (ref 10–40)
BASOPHILS # BLD AUTO: 0.04 K/UL (ref 0–0.2)
BASOPHILS NFR BLD: 0.8 % (ref 0–1.9)
BILIRUB SERPL-MCNC: 1.3 MG/DL (ref 0.1–1)
BUN SERPL-MCNC: 11 MG/DL (ref 6–20)
CALCIUM SERPL-MCNC: 9.1 MG/DL (ref 8.7–10.5)
CHLORIDE SERPL-SCNC: 102 MMOL/L (ref 95–110)
CHOLEST SERPL-MCNC: 269 MG/DL (ref 120–199)
CHOLEST/HDLC SERPL: 9 {RATIO} (ref 2–5)
CO2 SERPL-SCNC: 28 MMOL/L (ref 23–29)
CREAT SERPL-MCNC: 1 MG/DL (ref 0.5–1.4)
DIFFERENTIAL METHOD: ABNORMAL
EOSINOPHIL # BLD AUTO: 0.1 K/UL (ref 0–0.5)
EOSINOPHIL NFR BLD: 2.3 % (ref 0–8)
ERYTHROCYTE [DISTWIDTH] IN BLOOD BY AUTOMATED COUNT: 13.3 % (ref 11.5–14.5)
EST. GFR  (AFRICAN AMERICAN): >60 ML/MIN/1.73 M^2
EST. GFR  (NON AFRICAN AMERICAN): >60 ML/MIN/1.73 M^2
GLUCOSE SERPL-MCNC: 98 MG/DL (ref 70–110)
HCT VFR BLD AUTO: 45.6 % (ref 40–54)
HDLC SERPL-MCNC: 30 MG/DL (ref 40–75)
HDLC SERPL: 11.2 % (ref 20–50)
HGB BLD-MCNC: 14.6 G/DL (ref 14–18)
IMM GRANULOCYTES # BLD AUTO: 0.02 K/UL (ref 0–0.04)
IMM GRANULOCYTES NFR BLD AUTO: 0.4 % (ref 0–0.5)
LDLC SERPL CALC-MCNC: ABNORMAL MG/DL (ref 63–159)
LYMPHOCYTES # BLD AUTO: 1 K/UL (ref 1–4.8)
LYMPHOCYTES NFR BLD: 21.5 % (ref 18–48)
MCH RBC QN AUTO: 28.8 PG (ref 27–31)
MCHC RBC AUTO-ENTMCNC: 32 G/DL (ref 32–36)
MCV RBC AUTO: 90 FL (ref 82–98)
MONOCYTES # BLD AUTO: 0.6 K/UL (ref 0.3–1)
MONOCYTES NFR BLD: 11.8 % (ref 4–15)
NEUTROPHILS # BLD AUTO: 3.1 K/UL (ref 1.8–7.7)
NEUTROPHILS NFR BLD: 63.2 % (ref 38–73)
NONHDLC SERPL-MCNC: 239 MG/DL
NRBC BLD-RTO: 0 /100 WBC
PLATELET # BLD AUTO: 123 K/UL (ref 150–350)
PMV BLD AUTO: 11.4 FL (ref 9.2–12.9)
POTASSIUM SERPL-SCNC: 3.8 MMOL/L (ref 3.5–5.1)
PROT SERPL-MCNC: 7.5 G/DL (ref 6–8.4)
RBC # BLD AUTO: 5.07 M/UL (ref 4.6–6.2)
SODIUM SERPL-SCNC: 139 MMOL/L (ref 136–145)
TESTOST SERPL-MCNC: 503 NG/DL (ref 304–1227)
TRIGL SERPL-MCNC: 465 MG/DL (ref 30–150)
TSH SERPL DL<=0.005 MIU/L-ACNC: 1.33 UIU/ML (ref 0.4–4)
WBC # BLD AUTO: 4.84 K/UL (ref 3.9–12.7)

## 2021-02-09 PROCEDURE — 80053 COMPREHEN METABOLIC PANEL: CPT

## 2021-02-09 PROCEDURE — 85025 COMPLETE CBC W/AUTO DIFF WBC: CPT

## 2021-02-09 PROCEDURE — 84443 ASSAY THYROID STIM HORMONE: CPT

## 2021-02-09 PROCEDURE — 36415 COLL VENOUS BLD VENIPUNCTURE: CPT

## 2021-02-09 PROCEDURE — 80061 LIPID PANEL: CPT

## 2021-02-09 PROCEDURE — 84403 ASSAY OF TOTAL TESTOSTERONE: CPT

## 2021-02-10 ENCOUNTER — PATIENT MESSAGE (OUTPATIENT)
Dept: INTERNAL MEDICINE | Facility: CLINIC | Age: 41
End: 2021-02-10

## 2021-02-10 DIAGNOSIS — D69.6 THROMBOCYTOPENIA: ICD-10-CM

## 2021-02-10 DIAGNOSIS — E78.1 HYPERTRIGLYCERIDEMIA: Primary | ICD-10-CM

## 2021-02-11 ENCOUNTER — HOSPITAL ENCOUNTER (OUTPATIENT)
Dept: RADIOLOGY | Facility: HOSPITAL | Age: 41
Discharge: HOME | End: 2021-02-11
Attending: INTERNAL MEDICINE
Payer: COMMERCIAL

## 2021-02-11 ENCOUNTER — PATIENT MESSAGE (OUTPATIENT)
Dept: INTERNAL MEDICINE | Facility: CLINIC | Age: 41
End: 2021-02-11

## 2021-02-11 ENCOUNTER — TELEPHONE (OUTPATIENT)
Dept: HEMATOLOGY/ONCOLOGY | Facility: CLINIC | Age: 41
End: 2021-02-11

## 2021-02-11 DIAGNOSIS — E04.1 THYROID NODULE: ICD-10-CM

## 2021-02-11 PROCEDURE — 76536 US SOFT TISSUE HEAD NECK THYROID: ICD-10-PCS | Mod: 26,,, | Performed by: RADIOLOGY

## 2021-02-11 PROCEDURE — 76536 US EXAM OF HEAD AND NECK: CPT | Mod: TC

## 2021-02-11 PROCEDURE — 76536 US EXAM OF HEAD AND NECK: CPT | Mod: 26,,, | Performed by: RADIOLOGY

## 2021-02-12 ENCOUNTER — OFFICE VISIT (OUTPATIENT)
Dept: DERMATOLOGY | Facility: CLINIC | Age: 41
End: 2021-02-12
Payer: COMMERCIAL

## 2021-02-12 ENCOUNTER — TELEPHONE (OUTPATIENT)
Dept: DERMATOLOGY | Facility: CLINIC | Age: 41
End: 2021-02-12

## 2021-02-12 DIAGNOSIS — B08.1 MOLLUSCUM CONTAGIOSUM: ICD-10-CM

## 2021-02-12 DIAGNOSIS — D18.01 CHERRY ANGIOMA: ICD-10-CM

## 2021-02-12 DIAGNOSIS — Z12.83 SCREENING EXAM FOR SKIN CANCER: ICD-10-CM

## 2021-02-12 DIAGNOSIS — D22.9 MULTIPLE BENIGN NEVI: ICD-10-CM

## 2021-02-12 DIAGNOSIS — D48.5 NEOPLASM OF UNCERTAIN BEHAVIOR OF SKIN: ICD-10-CM

## 2021-02-12 DIAGNOSIS — Q82.5: ICD-10-CM

## 2021-02-12 DIAGNOSIS — L82.1 SK (SEBORRHEIC KERATOSIS): Primary | ICD-10-CM

## 2021-02-12 DIAGNOSIS — L91.8 ACROCHORDON: ICD-10-CM

## 2021-02-12 PROCEDURE — 99999 PR PBB SHADOW E&M-EST. PATIENT-LVL III: CPT | Mod: PBBFAC,,, | Performed by: DERMATOLOGY

## 2021-02-12 PROCEDURE — 11102 TANGNTL BX SKIN SINGLE LES: CPT | Mod: S$GLB,,, | Performed by: DERMATOLOGY

## 2021-02-12 PROCEDURE — 88305 TISSUE EXAM BY PATHOLOGIST: CPT | Performed by: PATHOLOGY

## 2021-02-12 PROCEDURE — 99203 PR OFFICE/OUTPT VISIT, NEW, LEVL III, 30-44 MIN: ICD-10-PCS | Mod: 25,S$GLB,, | Performed by: DERMATOLOGY

## 2021-02-12 PROCEDURE — 17110 DESTRUCTION B9 LES UP TO 14: CPT | Mod: 59,S$GLB,, | Performed by: DERMATOLOGY

## 2021-02-12 PROCEDURE — 99999 PR PBB SHADOW E&M-EST. PATIENT-LVL III: ICD-10-PCS | Mod: PBBFAC,,, | Performed by: DERMATOLOGY

## 2021-02-12 PROCEDURE — 1126F PR PAIN SEVERITY QUANTIFIED, NO PAIN PRESENT: ICD-10-PCS | Mod: S$GLB,,, | Performed by: DERMATOLOGY

## 2021-02-12 PROCEDURE — 11102 PR TANGENTIAL BIOPSY, SKIN, SINGLE LESION: ICD-10-PCS | Mod: S$GLB,,, | Performed by: DERMATOLOGY

## 2021-02-12 PROCEDURE — 1126F AMNT PAIN NOTED NONE PRSNT: CPT | Mod: S$GLB,,, | Performed by: DERMATOLOGY

## 2021-02-12 PROCEDURE — 99203 OFFICE O/P NEW LOW 30 MIN: CPT | Mod: 25,S$GLB,, | Performed by: DERMATOLOGY

## 2021-02-12 PROCEDURE — 88305 TISSUE EXAM BY PATHOLOGIST: ICD-10-PCS | Mod: 26,,, | Performed by: PATHOLOGY

## 2021-02-12 PROCEDURE — 17110 PR DESTRUCTION BENIGN LESIONS UP TO 14: ICD-10-PCS | Mod: 59,S$GLB,, | Performed by: DERMATOLOGY

## 2021-02-12 PROCEDURE — 88305 TISSUE EXAM BY PATHOLOGIST: CPT | Mod: 26,,, | Performed by: PATHOLOGY

## 2021-02-12 RX ORDER — MUPIROCIN 20 MG/G
OINTMENT TOPICAL DAILY
Qty: 1 TUBE | Refills: 2 | Status: SHIPPED | OUTPATIENT
Start: 2021-02-12 | End: 2022-03-21

## 2021-02-15 LAB
FINAL PATHOLOGIC DIAGNOSIS: NORMAL
GROSS: NORMAL
MICROSCOPIC EXAM: NORMAL

## 2021-02-23 ENCOUNTER — PATIENT MESSAGE (OUTPATIENT)
Dept: INTERNAL MEDICINE | Facility: CLINIC | Age: 41
End: 2021-02-23

## 2021-02-23 ENCOUNTER — PATIENT MESSAGE (OUTPATIENT)
Dept: DERMATOLOGY | Facility: CLINIC | Age: 41
End: 2021-02-23

## 2021-03-01 ENCOUNTER — OFFICE VISIT (OUTPATIENT)
Dept: ALLERGY | Facility: CLINIC | Age: 41
End: 2021-03-01
Payer: COMMERCIAL

## 2021-03-01 ENCOUNTER — PATIENT OUTREACH (OUTPATIENT)
Dept: ADMINISTRATIVE | Facility: OTHER | Age: 41
End: 2021-03-01

## 2021-03-01 VITALS — BODY MASS INDEX: 29.96 KG/M2 | HEIGHT: 76 IN | WEIGHT: 246.06 LBS

## 2021-03-01 DIAGNOSIS — R53.82 CHRONIC FATIGUE: ICD-10-CM

## 2021-03-01 DIAGNOSIS — J32.9 RECURRENT SINUSITIS: ICD-10-CM

## 2021-03-01 PROCEDURE — 99244 PR OFFICE CONSULTATION,LEVEL IV: ICD-10-PCS | Mod: S$GLB,,, | Performed by: ALLERGY & IMMUNOLOGY

## 2021-03-01 PROCEDURE — 3008F BODY MASS INDEX DOCD: CPT | Mod: CPTII,S$GLB,, | Performed by: ALLERGY & IMMUNOLOGY

## 2021-03-01 PROCEDURE — 99244 OFF/OP CNSLTJ NEW/EST MOD 40: CPT | Mod: S$GLB,,, | Performed by: ALLERGY & IMMUNOLOGY

## 2021-03-01 PROCEDURE — 1126F PR PAIN SEVERITY QUANTIFIED, NO PAIN PRESENT: ICD-10-PCS | Mod: S$GLB,,, | Performed by: ALLERGY & IMMUNOLOGY

## 2021-03-01 PROCEDURE — 99999 PR PBB SHADOW E&M-EST. PATIENT-LVL III: ICD-10-PCS | Mod: PBBFAC,,, | Performed by: ALLERGY & IMMUNOLOGY

## 2021-03-01 PROCEDURE — 99999 PR PBB SHADOW E&M-EST. PATIENT-LVL III: CPT | Mod: PBBFAC,,, | Performed by: ALLERGY & IMMUNOLOGY

## 2021-03-01 PROCEDURE — 1126F AMNT PAIN NOTED NONE PRSNT: CPT | Mod: S$GLB,,, | Performed by: ALLERGY & IMMUNOLOGY

## 2021-03-01 PROCEDURE — 3008F PR BODY MASS INDEX (BMI) DOCUMENTED: ICD-10-PCS | Mod: CPTII,S$GLB,, | Performed by: ALLERGY & IMMUNOLOGY

## 2021-03-01 RX ORDER — AZELASTINE 1 MG/ML
2 SPRAY, METERED NASAL 2 TIMES DAILY
Qty: 30 ML | Refills: 6 | Status: SHIPPED | OUTPATIENT
Start: 2021-03-01 | End: 2022-03-21 | Stop reason: SDUPTHER

## 2021-03-12 ENCOUNTER — LAB VISIT (OUTPATIENT)
Dept: LAB | Facility: HOSPITAL | Age: 41
End: 2021-03-12
Attending: ALLERGY & IMMUNOLOGY
Payer: COMMERCIAL

## 2021-03-12 ENCOUNTER — OFFICE VISIT (OUTPATIENT)
Dept: HEMATOLOGY/ONCOLOGY | Facility: CLINIC | Age: 41
End: 2021-03-12
Payer: COMMERCIAL

## 2021-03-12 VITALS
OXYGEN SATURATION: 97 % | SYSTOLIC BLOOD PRESSURE: 119 MMHG | WEIGHT: 242.19 LBS | RESPIRATION RATE: 17 BRPM | HEIGHT: 76 IN | HEART RATE: 93 BPM | BODY MASS INDEX: 29.49 KG/M2 | TEMPERATURE: 98 F | DIASTOLIC BLOOD PRESSURE: 76 MMHG

## 2021-03-12 DIAGNOSIS — R16.1 SPLENOMEGALY: ICD-10-CM

## 2021-03-12 DIAGNOSIS — D69.6 THROMBOCYTOPENIA: Primary | ICD-10-CM

## 2021-03-12 DIAGNOSIS — J32.9 RECURRENT SINUSITIS: ICD-10-CM

## 2021-03-12 PROCEDURE — 99999 PR PBB SHADOW E&M-EST. PATIENT-LVL III: ICD-10-PCS | Mod: PBBFAC,,, | Performed by: INTERNAL MEDICINE

## 2021-03-12 PROCEDURE — 3008F PR BODY MASS INDEX (BMI) DOCUMENTED: ICD-10-PCS | Mod: CPTII,S$GLB,, | Performed by: INTERNAL MEDICINE

## 2021-03-12 PROCEDURE — 3008F BODY MASS INDEX DOCD: CPT | Mod: CPTII,S$GLB,, | Performed by: INTERNAL MEDICINE

## 2021-03-12 PROCEDURE — 99204 PR OFFICE/OUTPT VISIT, NEW, LEVL IV, 45-59 MIN: ICD-10-PCS | Mod: S$GLB,,, | Performed by: INTERNAL MEDICINE

## 2021-03-12 PROCEDURE — 99204 OFFICE O/P NEW MOD 45 MIN: CPT | Mod: S$GLB,,, | Performed by: INTERNAL MEDICINE

## 2021-03-12 PROCEDURE — 82784 ASSAY IGA/IGD/IGG/IGM EACH: CPT | Mod: 59 | Performed by: ALLERGY & IMMUNOLOGY

## 2021-03-12 PROCEDURE — 99999 PR PBB SHADOW E&M-EST. PATIENT-LVL III: CPT | Mod: PBBFAC,,, | Performed by: INTERNAL MEDICINE

## 2021-03-12 PROCEDURE — 86317 IMMUNOASSAY INFECTIOUS AGENT: CPT | Performed by: ALLERGY & IMMUNOLOGY

## 2021-03-12 PROCEDURE — 1126F PR PAIN SEVERITY QUANTIFIED, NO PAIN PRESENT: ICD-10-PCS | Mod: S$GLB,,, | Performed by: INTERNAL MEDICINE

## 2021-03-12 PROCEDURE — 82785 ASSAY OF IGE: CPT | Performed by: ALLERGY & IMMUNOLOGY

## 2021-03-12 PROCEDURE — 1126F AMNT PAIN NOTED NONE PRSNT: CPT | Mod: S$GLB,,, | Performed by: INTERNAL MEDICINE

## 2021-03-13 LAB
IGA SERPL-MCNC: 260 MG/DL (ref 40–350)
IGE SERPL-ACNC: <35 IU/ML (ref 0–100)
IGG SERPL-MCNC: 1154 MG/DL (ref 650–1600)
IGM SERPL-MCNC: 164 MG/DL (ref 50–300)

## 2021-03-18 LAB
DEPRECATED S PNEUM12 IGG SER-MCNC: 2.2 UG/ML
DEPRECATED S PNEUM23 IGG SER-MCNC: 21.1 UG/ML
DEPRECATED S PNEUM4 IGG SER-MCNC: 9.4 UG/ML
DEPRECATED S PNEUM8 IGG SER-MCNC: 3.6 UG/ML
DEPRECATED S PNEUM9 IGG SER-MCNC: 6.3 UG/ML
S PN DA SERO 19F IGG SER-MCNC: 45.7 UG/ML
S PNEUM DA 1 IGG SER-MCNC: 3.1 UG/ML
S PNEUM DA 14 IGG SER-MCNC: 36.2
S PNEUM DA 18C IGG SER-MCNC: 13.4
S PNEUM DA 3 IGG SER-MCNC: 2.3 UG/ML
S PNEUM DA 5 IGG SER-MCNC: 27.9 UG/ML
S PNEUM DA 6B IGG SER-MCNC: 29.5 UG/ML
S PNEUM DA 7F IGG SER-MCNC: 16.8 UG/ML
S PNEUM DA 9V IGG SER-MCNC: 3.2 UG/ML

## 2021-03-22 ENCOUNTER — OFFICE VISIT (OUTPATIENT)
Dept: URGENT CARE | Facility: CLINIC | Age: 41
End: 2021-03-22
Payer: COMMERCIAL

## 2021-03-22 VITALS
TEMPERATURE: 98 F | BODY MASS INDEX: 29.47 KG/M2 | DIASTOLIC BLOOD PRESSURE: 81 MMHG | OXYGEN SATURATION: 97 % | SYSTOLIC BLOOD PRESSURE: 118 MMHG | HEART RATE: 89 BPM | HEIGHT: 76 IN | WEIGHT: 242 LBS

## 2021-03-22 DIAGNOSIS — H81.11 BPPV (BENIGN PAROXYSMAL POSITIONAL VERTIGO), RIGHT: Primary | ICD-10-CM

## 2021-03-22 DIAGNOSIS — R11.0 NAUSEA: ICD-10-CM

## 2021-03-22 LAB
CTP QC/QA: YES
SARS-COV-2 RDRP RESP QL NAA+PROBE: NEGATIVE

## 2021-03-22 PROCEDURE — U0002: ICD-10-PCS | Mod: QW,S$GLB,, | Performed by: PHYSICIAN ASSISTANT

## 2021-03-22 PROCEDURE — 3008F BODY MASS INDEX DOCD: CPT | Mod: CPTII,S$GLB,, | Performed by: PHYSICIAN ASSISTANT

## 2021-03-22 PROCEDURE — 99214 OFFICE O/P EST MOD 30 MIN: CPT | Mod: S$GLB,,, | Performed by: PHYSICIAN ASSISTANT

## 2021-03-22 PROCEDURE — U0002 COVID-19 LAB TEST NON-CDC: HCPCS | Mod: QW,S$GLB,, | Performed by: PHYSICIAN ASSISTANT

## 2021-03-22 PROCEDURE — 99214 PR OFFICE/OUTPT VISIT, EST, LEVL IV, 30-39 MIN: ICD-10-PCS | Mod: S$GLB,,, | Performed by: PHYSICIAN ASSISTANT

## 2021-03-22 PROCEDURE — 3008F PR BODY MASS INDEX (BMI) DOCUMENTED: ICD-10-PCS | Mod: CPTII,S$GLB,, | Performed by: PHYSICIAN ASSISTANT

## 2021-03-22 RX ORDER — MECLIZINE HYDROCHLORIDE 25 MG/1
25 TABLET ORAL 3 TIMES DAILY PRN
Qty: 20 TABLET | Refills: 0 | Status: SHIPPED | OUTPATIENT
Start: 2021-03-22 | End: 2022-03-21

## 2021-03-22 RX ORDER — ONDANSETRON 4 MG/1
4 TABLET, ORALLY DISINTEGRATING ORAL EVERY 8 HOURS PRN
Qty: 12 TABLET | Refills: 0 | Status: SHIPPED | OUTPATIENT
Start: 2021-03-22 | End: 2022-03-21

## 2021-03-22 RX ORDER — ONDANSETRON 8 MG/1
8 TABLET, ORALLY DISINTEGRATING ORAL
Status: COMPLETED | OUTPATIENT
Start: 2021-03-22 | End: 2021-03-22

## 2021-03-22 RX ADMIN — ONDANSETRON 8 MG: 8 TABLET, ORALLY DISINTEGRATING ORAL at 12:03

## 2021-03-24 ENCOUNTER — OFFICE VISIT (OUTPATIENT)
Dept: OTOLARYNGOLOGY | Facility: CLINIC | Age: 41
End: 2021-03-24
Payer: COMMERCIAL

## 2021-03-24 DIAGNOSIS — R09.89 RUNNY NOSE: ICD-10-CM

## 2021-03-24 DIAGNOSIS — R04.0 RECURRENT EPISTAXIS: ICD-10-CM

## 2021-03-24 DIAGNOSIS — R42 VERTIGO: Primary | ICD-10-CM

## 2021-03-24 DIAGNOSIS — J31.0 CHRONIC RHINITIS: ICD-10-CM

## 2021-03-24 PROCEDURE — 1126F AMNT PAIN NOTED NONE PRSNT: CPT | Mod: S$GLB,,, | Performed by: NURSE PRACTITIONER

## 2021-03-24 PROCEDURE — 1126F PR PAIN SEVERITY QUANTIFIED, NO PAIN PRESENT: ICD-10-PCS | Mod: S$GLB,,, | Performed by: NURSE PRACTITIONER

## 2021-03-24 PROCEDURE — 92551 PR PURE TONE HEARING TEST, AIR: ICD-10-PCS | Mod: S$GLB,,, | Performed by: NURSE PRACTITIONER

## 2021-03-24 PROCEDURE — 99999 PR PBB SHADOW E&M-EST. PATIENT-LVL III: ICD-10-PCS | Mod: PBBFAC,,, | Performed by: NURSE PRACTITIONER

## 2021-03-24 PROCEDURE — 99999 PR PBB SHADOW E&M-EST. PATIENT-LVL III: CPT | Mod: PBBFAC,,, | Performed by: NURSE PRACTITIONER

## 2021-03-24 PROCEDURE — 99215 PR OFFICE/OUTPT VISIT, EST, LEVL V, 40-54 MIN: ICD-10-PCS | Mod: 25,S$GLB,, | Performed by: NURSE PRACTITIONER

## 2021-03-24 PROCEDURE — 92551 PURE TONE HEARING TEST AIR: CPT | Mod: S$GLB,,, | Performed by: NURSE PRACTITIONER

## 2021-03-24 PROCEDURE — 99215 OFFICE O/P EST HI 40 MIN: CPT | Mod: 25,S$GLB,, | Performed by: NURSE PRACTITIONER

## 2021-03-25 ENCOUNTER — PATIENT MESSAGE (OUTPATIENT)
Dept: OTOLARYNGOLOGY | Facility: CLINIC | Age: 41
End: 2021-03-25

## 2021-05-10 ENCOUNTER — LAB VISIT (OUTPATIENT)
Dept: LAB | Facility: HOSPITAL | Age: 41
End: 2021-05-10
Attending: INTERNAL MEDICINE
Payer: COMMERCIAL

## 2021-05-10 DIAGNOSIS — E78.1 HYPERTRIGLYCERIDEMIA: ICD-10-CM

## 2021-05-10 LAB
CHOLEST SERPL-MCNC: 161 MG/DL (ref 120–199)
CHOLEST/HDLC SERPL: 4.5 {RATIO} (ref 2–5)
HDLC SERPL-MCNC: 36 MG/DL (ref 40–75)
HDLC SERPL: 22.4 % (ref 20–50)
LDLC SERPL CALC-MCNC: 95.2 MG/DL (ref 63–159)
NONHDLC SERPL-MCNC: 125 MG/DL
TRIGL SERPL-MCNC: 149 MG/DL (ref 30–150)

## 2021-05-10 PROCEDURE — 36415 COLL VENOUS BLD VENIPUNCTURE: CPT | Performed by: INTERNAL MEDICINE

## 2021-05-10 PROCEDURE — 80061 LIPID PANEL: CPT | Performed by: INTERNAL MEDICINE

## 2021-05-13 ENCOUNTER — OFFICE VISIT (OUTPATIENT)
Dept: OPTOMETRY | Facility: CLINIC | Age: 41
End: 2021-05-13
Payer: COMMERCIAL

## 2021-05-13 DIAGNOSIS — H52.03 HYPEROPIA, BILATERAL: Primary | ICD-10-CM

## 2021-05-13 DIAGNOSIS — Z00.00 VISIT FOR ANNUAL HEALTH EXAMINATION: ICD-10-CM

## 2021-05-13 PROCEDURE — 92004 COMPRE OPH EXAM NEW PT 1/>: CPT | Mod: S$GLB,,, | Performed by: OPTOMETRIST

## 2021-05-13 PROCEDURE — 92004 PR EYE EXAM, NEW PATIENT,COMPREHESV: ICD-10-PCS | Mod: S$GLB,,, | Performed by: OPTOMETRIST

## 2021-05-13 PROCEDURE — 92015 DETERMINE REFRACTIVE STATE: CPT | Mod: S$GLB,,, | Performed by: OPTOMETRIST

## 2021-05-13 PROCEDURE — 99999 PR PBB SHADOW E&M-EST. PATIENT-LVL III: CPT | Mod: PBBFAC,,, | Performed by: OPTOMETRIST

## 2021-05-13 PROCEDURE — 92015 PR REFRACTION: ICD-10-PCS | Mod: S$GLB,,, | Performed by: OPTOMETRIST

## 2021-05-13 PROCEDURE — 99999 PR PBB SHADOW E&M-EST. PATIENT-LVL III: ICD-10-PCS | Mod: PBBFAC,,, | Performed by: OPTOMETRIST

## 2021-09-11 ENCOUNTER — OFFICE VISIT (OUTPATIENT)
Dept: URGENT CARE | Facility: CLINIC | Age: 41
End: 2021-09-11
Payer: COMMERCIAL

## 2021-09-11 VITALS
DIASTOLIC BLOOD PRESSURE: 85 MMHG | HEIGHT: 76 IN | HEART RATE: 89 BPM | BODY MASS INDEX: 29.47 KG/M2 | RESPIRATION RATE: 16 BRPM | TEMPERATURE: 98 F | SYSTOLIC BLOOD PRESSURE: 119 MMHG | OXYGEN SATURATION: 96 % | WEIGHT: 242 LBS

## 2021-09-11 DIAGNOSIS — J06.9 URI, ACUTE: Primary | ICD-10-CM

## 2021-09-11 LAB
CTP QC/QA: YES
CTP QC/QA: YES
MOLECULAR STREP A: NEGATIVE
SARS-COV-2 RDRP RESP QL NAA+PROBE: NEGATIVE

## 2021-09-11 PROCEDURE — 1159F PR MEDICATION LIST DOCUMENTED IN MEDICAL RECORD: ICD-10-PCS | Mod: CPTII,S$GLB,, | Performed by: FAMILY MEDICINE

## 2021-09-11 PROCEDURE — 3079F PR MOST RECENT DIASTOLIC BLOOD PRESSURE 80-89 MM HG: ICD-10-PCS | Mod: CPTII,S$GLB,, | Performed by: FAMILY MEDICINE

## 2021-09-11 PROCEDURE — 1160F PR REVIEW ALL MEDS BY PRESCRIBER/CLIN PHARMACIST DOCUMENTED: ICD-10-PCS | Mod: CPTII,S$GLB,, | Performed by: FAMILY MEDICINE

## 2021-09-11 PROCEDURE — 3008F BODY MASS INDEX DOCD: CPT | Mod: CPTII,S$GLB,, | Performed by: FAMILY MEDICINE

## 2021-09-11 PROCEDURE — 87651 STREP A DNA AMP PROBE: CPT | Mod: QW,S$GLB,, | Performed by: FAMILY MEDICINE

## 2021-09-11 PROCEDURE — 3074F SYST BP LT 130 MM HG: CPT | Mod: CPTII,S$GLB,, | Performed by: FAMILY MEDICINE

## 2021-09-11 PROCEDURE — 3079F DIAST BP 80-89 MM HG: CPT | Mod: CPTII,S$GLB,, | Performed by: FAMILY MEDICINE

## 2021-09-11 PROCEDURE — 99214 PR OFFICE/OUTPT VISIT, EST, LEVL IV, 30-39 MIN: ICD-10-PCS | Mod: S$GLB,CS,, | Performed by: FAMILY MEDICINE

## 2021-09-11 PROCEDURE — 1160F RVW MEDS BY RX/DR IN RCRD: CPT | Mod: CPTII,S$GLB,, | Performed by: FAMILY MEDICINE

## 2021-09-11 PROCEDURE — 1159F MED LIST DOCD IN RCRD: CPT | Mod: CPTII,S$GLB,, | Performed by: FAMILY MEDICINE

## 2021-09-11 PROCEDURE — 99214 OFFICE O/P EST MOD 30 MIN: CPT | Mod: S$GLB,CS,, | Performed by: FAMILY MEDICINE

## 2021-09-11 PROCEDURE — U0002: ICD-10-PCS | Mod: QW,S$GLB,, | Performed by: FAMILY MEDICINE

## 2021-09-11 PROCEDURE — 3008F PR BODY MASS INDEX (BMI) DOCUMENTED: ICD-10-PCS | Mod: CPTII,S$GLB,, | Performed by: FAMILY MEDICINE

## 2021-09-11 PROCEDURE — 3074F PR MOST RECENT SYSTOLIC BLOOD PRESSURE < 130 MM HG: ICD-10-PCS | Mod: CPTII,S$GLB,, | Performed by: FAMILY MEDICINE

## 2021-09-11 PROCEDURE — U0002 COVID-19 LAB TEST NON-CDC: HCPCS | Mod: QW,S$GLB,, | Performed by: FAMILY MEDICINE

## 2021-09-11 PROCEDURE — 87651 POCT STREP A MOLECULAR: ICD-10-PCS | Mod: QW,S$GLB,, | Performed by: FAMILY MEDICINE

## 2021-09-11 RX ORDER — METHYLPREDNISOLONE 4 MG/1
TABLET ORAL
Qty: 1 PACKAGE | Refills: 0 | Status: SHIPPED | OUTPATIENT
Start: 2021-09-11 | End: 2021-10-02

## 2022-01-12 ENCOUNTER — PATIENT MESSAGE (OUTPATIENT)
Dept: ADMINISTRATIVE | Facility: OTHER | Age: 42
End: 2022-01-12
Payer: COMMERCIAL

## 2022-03-21 ENCOUNTER — OFFICE VISIT (OUTPATIENT)
Dept: INTERNAL MEDICINE | Facility: CLINIC | Age: 42
End: 2022-03-21
Payer: COMMERCIAL

## 2022-03-21 ENCOUNTER — LAB VISIT (OUTPATIENT)
Dept: LAB | Facility: HOSPITAL | Age: 42
End: 2022-03-21
Attending: INTERNAL MEDICINE
Payer: COMMERCIAL

## 2022-03-21 VITALS
DIASTOLIC BLOOD PRESSURE: 84 MMHG | HEART RATE: 93 BPM | HEIGHT: 76 IN | SYSTOLIC BLOOD PRESSURE: 118 MMHG | OXYGEN SATURATION: 99 % | BODY MASS INDEX: 29.21 KG/M2 | WEIGHT: 239.88 LBS

## 2022-03-21 DIAGNOSIS — G47.33 OSA (OBSTRUCTIVE SLEEP APNEA): ICD-10-CM

## 2022-03-21 DIAGNOSIS — R05.9 COUGH: ICD-10-CM

## 2022-03-21 DIAGNOSIS — R41.89 BRAIN FOG: ICD-10-CM

## 2022-03-21 DIAGNOSIS — Z00.00 VISIT FOR ANNUAL HEALTH EXAMINATION: Primary | ICD-10-CM

## 2022-03-21 DIAGNOSIS — E04.1 THYROID NODULE: ICD-10-CM

## 2022-03-21 DIAGNOSIS — R93.89 ABNORMAL CT OF THE CHEST: ICD-10-CM

## 2022-03-21 DIAGNOSIS — F32.9 MAJOR DEPRESSION, CHRONIC: ICD-10-CM

## 2022-03-21 DIAGNOSIS — R53.82 CHRONIC FATIGUE: ICD-10-CM

## 2022-03-21 DIAGNOSIS — Z13.220 LIPID SCREENING: ICD-10-CM

## 2022-03-21 DIAGNOSIS — D69.6 THROMBOCYTOPENIA: ICD-10-CM

## 2022-03-21 DIAGNOSIS — J35.8 TONSILLITH: ICD-10-CM

## 2022-03-21 DIAGNOSIS — A31.0 PULMONARY MYCOBACTERIUM AVIUM COMPLEX (MAC) INFECTION: ICD-10-CM

## 2022-03-21 DIAGNOSIS — J31.0 CHRONIC RHINITIS: ICD-10-CM

## 2022-03-21 DIAGNOSIS — R35.89 POLYURIA: ICD-10-CM

## 2022-03-21 LAB
ALBUMIN SERPL BCP-MCNC: 4.3 G/DL (ref 3.5–5.2)
ALP SERPL-CCNC: 73 U/L (ref 55–135)
ALT SERPL W/O P-5'-P-CCNC: 41 U/L (ref 10–44)
ANION GAP SERPL CALC-SCNC: 9 MMOL/L (ref 8–16)
AST SERPL-CCNC: 33 U/L (ref 10–40)
BASOPHILS # BLD AUTO: 0.04 K/UL (ref 0–0.2)
BASOPHILS NFR BLD: 0.8 % (ref 0–1.9)
BILIRUB SERPL-MCNC: 1.2 MG/DL (ref 0.1–1)
BILIRUB UR QL STRIP: NEGATIVE
BUN SERPL-MCNC: 14 MG/DL (ref 6–20)
CALCIUM SERPL-MCNC: 9.6 MG/DL (ref 8.7–10.5)
CHLORIDE SERPL-SCNC: 105 MMOL/L (ref 95–110)
CHOLEST SERPL-MCNC: 173 MG/DL (ref 120–199)
CHOLEST/HDLC SERPL: 3.7 {RATIO} (ref 2–5)
CLARITY UR REFRACT.AUTO: CLEAR
CO2 SERPL-SCNC: 28 MMOL/L (ref 23–29)
COLOR UR AUTO: YELLOW
COMPLEXED PSA SERPL-MCNC: 1 NG/ML (ref 0–4)
CREAT SERPL-MCNC: 0.9 MG/DL (ref 0.5–1.4)
DIFFERENTIAL METHOD: NORMAL
EOSINOPHIL # BLD AUTO: 0.1 K/UL (ref 0–0.5)
EOSINOPHIL NFR BLD: 1.9 % (ref 0–8)
ERYTHROCYTE [DISTWIDTH] IN BLOOD BY AUTOMATED COUNT: 13.6 % (ref 11.5–14.5)
EST. GFR  (AFRICAN AMERICAN): >60 ML/MIN/1.73 M^2
EST. GFR  (NON AFRICAN AMERICAN): >60 ML/MIN/1.73 M^2
ESTIMATED AVG GLUCOSE: 103 MG/DL (ref 68–131)
GLUCOSE SERPL-MCNC: 86 MG/DL (ref 70–110)
GLUCOSE UR QL STRIP: NEGATIVE
HBA1C MFR BLD: 5.2 % (ref 4–5.6)
HCT VFR BLD AUTO: 45.4 % (ref 40–54)
HDLC SERPL-MCNC: 47 MG/DL (ref 40–75)
HDLC SERPL: 27.2 % (ref 20–50)
HGB BLD-MCNC: 14.7 G/DL (ref 14–18)
HGB UR QL STRIP: NEGATIVE
IMM GRANULOCYTES # BLD AUTO: 0.02 K/UL (ref 0–0.04)
IMM GRANULOCYTES NFR BLD AUTO: 0.4 % (ref 0–0.5)
KETONES UR QL STRIP: NEGATIVE
LDLC SERPL CALC-MCNC: 99.8 MG/DL (ref 63–159)
LEUKOCYTE ESTERASE UR QL STRIP: NEGATIVE
LYMPHOCYTES # BLD AUTO: 1 K/UL (ref 1–4.8)
LYMPHOCYTES NFR BLD: 21.4 % (ref 18–48)
MCH RBC QN AUTO: 29.2 PG (ref 27–31)
MCHC RBC AUTO-ENTMCNC: 32.4 G/DL (ref 32–36)
MCV RBC AUTO: 90 FL (ref 82–98)
MONOCYTES # BLD AUTO: 0.5 K/UL (ref 0.3–1)
MONOCYTES NFR BLD: 10.4 % (ref 4–15)
NEUTROPHILS # BLD AUTO: 3.1 K/UL (ref 1.8–7.7)
NEUTROPHILS NFR BLD: 65.1 % (ref 38–73)
NITRITE UR QL STRIP: NEGATIVE
NONHDLC SERPL-MCNC: 126 MG/DL
NRBC BLD-RTO: 0 /100 WBC
PH UR STRIP: 5 [PH] (ref 5–8)
PLATELET # BLD AUTO: 174 K/UL (ref 150–450)
PMV BLD AUTO: 11.4 FL (ref 9.2–12.9)
POTASSIUM SERPL-SCNC: 4.1 MMOL/L (ref 3.5–5.1)
PROT SERPL-MCNC: 7.6 G/DL (ref 6–8.4)
PROT UR QL STRIP: NEGATIVE
RBC # BLD AUTO: 5.04 M/UL (ref 4.6–6.2)
SODIUM SERPL-SCNC: 142 MMOL/L (ref 136–145)
SP GR UR STRIP: 1.02 (ref 1–1.03)
TRIGL SERPL-MCNC: 131 MG/DL (ref 30–150)
TSH SERPL DL<=0.005 MIU/L-ACNC: 1.42 UIU/ML (ref 0.4–4)
URN SPEC COLLECT METH UR: NORMAL
WBC # BLD AUTO: 4.71 K/UL (ref 3.9–12.7)

## 2022-03-21 PROCEDURE — 3079F PR MOST RECENT DIASTOLIC BLOOD PRESSURE 80-89 MM HG: ICD-10-PCS | Mod: CPTII,S$GLB,, | Performed by: INTERNAL MEDICINE

## 2022-03-21 PROCEDURE — 99999 PR PBB SHADOW E&M-EST. PATIENT-LVL V: ICD-10-PCS | Mod: PBBFAC,,, | Performed by: INTERNAL MEDICINE

## 2022-03-21 PROCEDURE — 3008F BODY MASS INDEX DOCD: CPT | Mod: CPTII,S$GLB,, | Performed by: INTERNAL MEDICINE

## 2022-03-21 PROCEDURE — 99999 PR PBB SHADOW E&M-EST. PATIENT-LVL V: CPT | Mod: PBBFAC,,, | Performed by: INTERNAL MEDICINE

## 2022-03-21 PROCEDURE — 1159F MED LIST DOCD IN RCRD: CPT | Mod: CPTII,S$GLB,, | Performed by: INTERNAL MEDICINE

## 2022-03-21 PROCEDURE — 3074F PR MOST RECENT SYSTOLIC BLOOD PRESSURE < 130 MM HG: ICD-10-PCS | Mod: CPTII,S$GLB,, | Performed by: INTERNAL MEDICINE

## 2022-03-21 PROCEDURE — 1160F RVW MEDS BY RX/DR IN RCRD: CPT | Mod: CPTII,S$GLB,, | Performed by: INTERNAL MEDICINE

## 2022-03-21 PROCEDURE — 3074F SYST BP LT 130 MM HG: CPT | Mod: CPTII,S$GLB,, | Performed by: INTERNAL MEDICINE

## 2022-03-21 PROCEDURE — 85025 COMPLETE CBC W/AUTO DIFF WBC: CPT | Performed by: INTERNAL MEDICINE

## 2022-03-21 PROCEDURE — 36415 COLL VENOUS BLD VENIPUNCTURE: CPT | Performed by: INTERNAL MEDICINE

## 2022-03-21 PROCEDURE — 80061 LIPID PANEL: CPT | Performed by: INTERNAL MEDICINE

## 2022-03-21 PROCEDURE — 80053 COMPREHEN METABOLIC PANEL: CPT | Performed by: INTERNAL MEDICINE

## 2022-03-21 PROCEDURE — 1160F PR REVIEW ALL MEDS BY PRESCRIBER/CLIN PHARMACIST DOCUMENTED: ICD-10-PCS | Mod: CPTII,S$GLB,, | Performed by: INTERNAL MEDICINE

## 2022-03-21 PROCEDURE — 99396 PR PREVENTIVE VISIT,EST,40-64: ICD-10-PCS | Mod: S$GLB,,, | Performed by: INTERNAL MEDICINE

## 2022-03-21 PROCEDURE — 84153 ASSAY OF PSA TOTAL: CPT | Performed by: INTERNAL MEDICINE

## 2022-03-21 PROCEDURE — 84443 ASSAY THYROID STIM HORMONE: CPT | Performed by: INTERNAL MEDICINE

## 2022-03-21 PROCEDURE — 81003 URINALYSIS AUTO W/O SCOPE: CPT | Performed by: INTERNAL MEDICINE

## 2022-03-21 PROCEDURE — 87086 URINE CULTURE/COLONY COUNT: CPT | Performed by: INTERNAL MEDICINE

## 2022-03-21 PROCEDURE — 83036 HEMOGLOBIN GLYCOSYLATED A1C: CPT | Performed by: INTERNAL MEDICINE

## 2022-03-21 PROCEDURE — 99396 PREV VISIT EST AGE 40-64: CPT | Mod: S$GLB,,, | Performed by: INTERNAL MEDICINE

## 2022-03-21 PROCEDURE — 3079F DIAST BP 80-89 MM HG: CPT | Mod: CPTII,S$GLB,, | Performed by: INTERNAL MEDICINE

## 2022-03-21 PROCEDURE — 3008F PR BODY MASS INDEX (BMI) DOCUMENTED: ICD-10-PCS | Mod: CPTII,S$GLB,, | Performed by: INTERNAL MEDICINE

## 2022-03-21 PROCEDURE — 1159F PR MEDICATION LIST DOCUMENTED IN MEDICAL RECORD: ICD-10-PCS | Mod: CPTII,S$GLB,, | Performed by: INTERNAL MEDICINE

## 2022-03-21 RX ORDER — METHYLPHENIDATE HYDROCHLORIDE 10 MG/1
10 TABLET ORAL DAILY
COMMUNITY
Start: 2021-11-01 | End: 2023-09-21

## 2022-03-21 RX ORDER — AZELASTINE 1 MG/ML
2 SPRAY, METERED NASAL 2 TIMES DAILY
Qty: 30 ML | Refills: 6 | Status: SHIPPED | OUTPATIENT
Start: 2022-03-21 | End: 2023-09-21

## 2022-03-21 NOTE — PROGRESS NOTES
INTERNAL MEDICINE ESTABLISHED PATIENT VISIT NOTE    Subjective:     Chief Complaint: Annual Exam       Patient ID: Juan José Ng is a 41 y.o. male with chronic rhinitis (see last note for details, has had sinus surgery in the past and followed by ID, allergy, and pulm in the past c neg immunodeficiency w/u, normal PFTs, normal CT sinuses/scope), depression (failed tx c cymbalta, lexapro, zoloft 2/2 inefficacy or s/e), last seen by me 2/2021, here today for annual exam.    Had COVID in Jan s complications but still c chronic fatigue.  Has also dealt c depressive sx in the past.  Seeing psych and seeing Dr. Silva and started on Ritalin and feels he is in a much better from a mood perspective but feels very tired.    Has GISELL but not using CPAP as he has been unable to tolerate the mask in the past.  States it triggers panic sx.    States fatigue has gotten progressive worse c some associated weakness and h/a.  Also c/o brain fog.  Has trouble focusing for more than a few seconds at a time.  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  States he is worried he might need to go on disability due to fatigue and brain fog.    Today also c/o polyuria.  No dysuria.  Sx present for the past few weeks.  Present during the day and at night and voids 7-10x/day.    Past Medical History:  Past Medical History:   Diagnosis Date    Allergy     Anxiety     Depression      of psychiatric care     Major depression, chronic 7/6/2017    Psychiatric problem     Recurrent upper respiratory infection (URI)     Therapy        Home Medications:  Prior to Admission medications    Medication Sig Start Date End Date Taking? Authorizing Provider   methylphenidate HCl (RITALIN) 10 MG tablet  11/1/21  Yes Historical Provider   azelastine (ASTELIN) 137 mcg (0.1 %) nasal spray 2 sprays (274 mcg total) by Nasal route 2 (two) times daily.  Patient not taking: Reported on 9/11/2021 3/1/21 3/1/22  Derrell Canas MD   hydrOXYzine HCL  "(ATARAX) 25 MG tablet Take 1 tablet (25 mg total) by mouth nightly as needed (insomnia). 2/8/21   Lina Silva MD   meclizine (ANTIVERT) 25 mg tablet Take 1 tablet (25 mg total) by mouth 3 (three) times daily as needed for Dizziness. 3/22/21   Betsy Cuevas PA-C   mupirocin (BACTROBAN) 2 % ointment Apply topically once daily.  Patient not taking: Reported on 3/22/2021 2/12/21   Audelia Zhou MD   ondansetron (ZOFRAN-ODT) 4 MG TbDL Take 1 tablet (4 mg total) by mouth every 8 (eight) hours as needed (nausea). 3/22/21   Betsy Cuevas PA-C       Allergies:  Review of patient's allergies indicates:  No Known Allergies    Social History:  Social History     Tobacco Use    Smoking status: Never Smoker    Smokeless tobacco: Never Used   Substance Use Topics    Alcohol use: No    Drug use: No        Review of Systems   Constitutional: Positive for activity change. Negative for unexpected weight change.   HENT: Negative for hearing loss, rhinorrhea and trouble swallowing.    Eyes: Negative for discharge and visual disturbance.   Respiratory: Negative for chest tightness and wheezing.    Cardiovascular: Negative for chest pain and palpitations.   Gastrointestinal: Negative for blood in stool, constipation, diarrhea and vomiting.   Endocrine: Positive for polyuria. Negative for polydipsia.   Genitourinary: Negative for difficulty urinating, hematuria and urgency.   Musculoskeletal: Negative for arthralgias, joint swelling and neck pain.   Neurological: Positive for weakness and headaches.   Psychiatric/Behavioral: Positive for confusion and dysphoric mood.         Health Maintenance:     Immunizations:   Influenza 11/2019, declined today.  TDap 6/2017  Prevnar 12/2019, Pneumovax 2/2021  COVID 2/2021, 3/2021 Pfizer, advised to schedule repeat.     Cancer Screening:  [facility] rec at 44    Objective:   /84 (BP Location: Right arm, Patient Position: Sitting, BP Method: Large (Manual))   Pulse 93   Ht 6' 4" " (1.93 m)   Wt 108.8 kg (239 lb 13.8 oz)   SpO2 99%   BMI 29.20 kg/m²        General: AAO x3, no apparent distress  HEENT: PERRL, OP clear  CV: RRR, no m/r/g  Pulm: Lungs CTAB, no crackles, no wheezes  Abd: s/NT/ND +BS  Extremities: no c/c/e  Rectal exam: normal prostate, firm, no enlargement or nodules appreciated    Labs:     Lab Results   Component Value Date    WBC 4.84 02/09/2021    HGB 14.6 02/09/2021    HCT 45.6 02/09/2021    MCV 90 02/09/2021     (L) 02/09/2021     Lab Results   Component Value Date    OTF68PMKQ Negative 07/10/2017     Lab Results   Component Value Date    HEPAIGM Negative 07/10/2017    HEPBIGM Negative 07/10/2017    HEPCAB Negative 07/10/2017     Lab Results   Component Value Date    ALIRRUYZ85 525 07/19/2018     Lab Results   Component Value Date    FOLATE 14.5 07/19/2018       Sodium   Date Value Ref Range Status   02/09/2021 139 136 - 145 mmol/L Final     Potassium   Date Value Ref Range Status   02/09/2021 3.8 3.5 - 5.1 mmol/L Final     Chloride   Date Value Ref Range Status   02/09/2021 102 95 - 110 mmol/L Final     CO2   Date Value Ref Range Status   02/09/2021 28 23 - 29 mmol/L Final     Glucose   Date Value Ref Range Status   02/09/2021 98 70 - 110 mg/dL Final     BUN   Date Value Ref Range Status   02/09/2021 11 6 - 20 mg/dL Final     Creatinine   Date Value Ref Range Status   02/09/2021 1.0 0.5 - 1.4 mg/dL Final     Calcium   Date Value Ref Range Status   02/09/2021 9.1 8.7 - 10.5 mg/dL Final     Total Protein   Date Value Ref Range Status   02/09/2021 7.5 6.0 - 8.4 g/dL Final     Albumin   Date Value Ref Range Status   02/09/2021 4.0 3.5 - 5.2 g/dL Final     Total Bilirubin   Date Value Ref Range Status   02/09/2021 1.3 (H) 0.1 - 1.0 mg/dL Final     Comment:     For infants and newborns, interpretation of results should be based  on gestational age, weight and in agreement with clinical  observations.    Premature Infant recommended reference ranges:  Up to 24  hours.............<8.0 mg/dL  Up to 48 hours............<12.0 mg/dL  3-5 days..................<15.0 mg/dL  6-29 days.................<15.0 mg/dL       Alkaline Phosphatase   Date Value Ref Range Status   02/09/2021 75 55 - 135 U/L Final     AST   Date Value Ref Range Status   02/09/2021 22 10 - 40 U/L Final     ALT   Date Value Ref Range Status   02/09/2021 27 10 - 44 U/L Final     Anion Gap   Date Value Ref Range Status   02/09/2021 9 8 - 16 mmol/L Final     eGFR if    Date Value Ref Range Status   02/09/2021 >60.0 >60 mL/min/1.73 m^2 Final     eGFR if non    Date Value Ref Range Status   02/09/2021 >60.0 >60 mL/min/1.73 m^2 Final     Comment:     Calculation used to obtain the estimated glomerular filtration  rate (eGFR) is the CKD-EPI equation.        Lab Results   Component Value Date    HGBA1C 5.0 12/04/2019     Lab Results   Component Value Date    LDLCALC 95.2 05/10/2021     Lab Results   Component Value Date    TSH 1.326 02/09/2021         Assessment/Plan     Juan José was seen today for annual exam.    Diagnoses and all orders for this visit:    Visit for annual health examination  History and physical exam completed.  Health maintenance reviewed as above.    Cough, abnormal CT chest  Had abnormal CT chest in the past via pulm, will set up f/u  -     CT Chest Without Contrast; Future    Chronic fatigue  Brain fog  Suspect multifactorial, advised to f/u c sleep clinic to discuss options for tx of GISELL, could also d/w psych to see if he can take something for anxiety associated c cpap use.  Will also send for eval by [facility] COVID clinic as sx worsened p having COVID via home testing in Jan.  Will repeat annual labs.  -     CBC Auto Differential; Future  -     Comprehensive Metabolic Panel; Future  -     TSH; Future  -     Ambulatory referral/consult to [facility]; Future    Major depression, chronic  Improved overall but did not tolerate several meds in the past, cont f/u c  psych    Chronic rhinitis  As per HPI  Cont f/u c allergy clinic as needed.    Pulmonary Mycobacterium avium complex (MAC) infection  Sx appear chronic  Pt to schedule f/u c pulm  -     Ambulatory referral/consult to Pulmonology; Future    Thyroid nodule  Due for f/u imaging, will order.  -     TSH; Future  -     US Soft Tissue Head Neck Thyroid; Future    GISELL (obstructive sleep apnea)  -     Ambulatory referral/consult to Sleep Disorders; Future    Polyuria  Normal prostate exam  Will check urine studies and A1C, consider eval via Urology pending initial w/u  -     Hemoglobin A1C; Future  -     Urinalysis  -     PSA, Screening; Future  -     Urine culture    Lipid screening  -     Lipid Panel; Future    Tonsillith  C/o recurrent tonsilliths and intermittent swelling  Will have ENT evaluate  -     Ambulatory referral/consult to ENT; Future    Thrombocytopenia  Nos  Will repeat labs, previous w/u unremarkable    HM as above  RTC in 6 mos, sooner if needed.  Labs today.  Lina Silva MD  Department of Internal Medicine - [facility]  03/21/2022

## 2022-03-22 LAB — BACTERIA UR CULT: NO GROWTH

## 2022-03-25 ENCOUNTER — HOSPITAL ENCOUNTER (OUTPATIENT)
Dept: RADIOLOGY | Facility: HOSPITAL | Age: 42
Discharge: HOME | End: 2022-03-25
Attending: INTERNAL MEDICINE
Payer: COMMERCIAL

## 2022-03-25 DIAGNOSIS — E04.1 THYROID NODULE: ICD-10-CM

## 2022-03-25 PROCEDURE — 76536 US SOFT TISSUE HEAD NECK THYROID: ICD-10-PCS | Mod: 26,,, | Performed by: RADIOLOGY

## 2022-03-25 PROCEDURE — 76536 US EXAM OF HEAD AND NECK: CPT | Mod: 26,,, | Performed by: RADIOLOGY

## 2022-03-25 PROCEDURE — 76536 US EXAM OF HEAD AND NECK: CPT | Mod: TC

## 2022-04-04 ENCOUNTER — OFFICE VISIT (OUTPATIENT)
Dept: OTOLARYNGOLOGY | Facility: CLINIC | Age: 42
End: 2022-04-04
Payer: COMMERCIAL

## 2022-04-04 VITALS
WEIGHT: 244.25 LBS | SYSTOLIC BLOOD PRESSURE: 127 MMHG | BODY MASS INDEX: 29.73 KG/M2 | DIASTOLIC BLOOD PRESSURE: 91 MMHG | TEMPERATURE: 98 F | HEART RATE: 98 BPM

## 2022-04-04 DIAGNOSIS — J35.8 TONSIL ASYMMETRY: ICD-10-CM

## 2022-04-04 PROCEDURE — 3008F PR BODY MASS INDEX (BMI) DOCUMENTED: ICD-10-PCS | Mod: CPTII,S$GLB,, | Performed by: OTOLARYNGOLOGY

## 2022-04-04 PROCEDURE — 1160F RVW MEDS BY RX/DR IN RCRD: CPT | Mod: CPTII,S$GLB,, | Performed by: OTOLARYNGOLOGY

## 2022-04-04 PROCEDURE — 1159F PR MEDICATION LIST DOCUMENTED IN MEDICAL RECORD: ICD-10-PCS | Mod: CPTII,S$GLB,, | Performed by: OTOLARYNGOLOGY

## 2022-04-04 PROCEDURE — 99999 PR PBB SHADOW E&M-EST. PATIENT-LVL III: ICD-10-PCS | Mod: PBBFAC,,, | Performed by: OTOLARYNGOLOGY

## 2022-04-04 PROCEDURE — 3044F PR MOST RECENT HEMOGLOBIN A1C LEVEL <7.0%: ICD-10-PCS | Mod: CPTII,S$GLB,, | Performed by: OTOLARYNGOLOGY

## 2022-04-04 PROCEDURE — 99213 PR OFFICE/OUTPT VISIT, EST, LEVL III, 20-29 MIN: ICD-10-PCS | Mod: S$GLB,,, | Performed by: OTOLARYNGOLOGY

## 2022-04-04 PROCEDURE — 3074F SYST BP LT 130 MM HG: CPT | Mod: CPTII,S$GLB,, | Performed by: OTOLARYNGOLOGY

## 2022-04-04 PROCEDURE — 1160F PR REVIEW ALL MEDS BY PRESCRIBER/CLIN PHARMACIST DOCUMENTED: ICD-10-PCS | Mod: CPTII,S$GLB,, | Performed by: OTOLARYNGOLOGY

## 2022-04-04 PROCEDURE — 3074F PR MOST RECENT SYSTOLIC BLOOD PRESSURE < 130 MM HG: ICD-10-PCS | Mod: CPTII,S$GLB,, | Performed by: OTOLARYNGOLOGY

## 2022-04-04 PROCEDURE — 99213 OFFICE O/P EST LOW 20 MIN: CPT | Mod: S$GLB,,, | Performed by: OTOLARYNGOLOGY

## 2022-04-04 PROCEDURE — 3044F HG A1C LEVEL LT 7.0%: CPT | Mod: CPTII,S$GLB,, | Performed by: OTOLARYNGOLOGY

## 2022-04-04 PROCEDURE — 3080F PR MOST RECENT DIASTOLIC BLOOD PRESSURE >= 90 MM HG: ICD-10-PCS | Mod: CPTII,S$GLB,, | Performed by: OTOLARYNGOLOGY

## 2022-04-04 PROCEDURE — 3008F BODY MASS INDEX DOCD: CPT | Mod: CPTII,S$GLB,, | Performed by: OTOLARYNGOLOGY

## 2022-04-04 PROCEDURE — 3080F DIAST BP >= 90 MM HG: CPT | Mod: CPTII,S$GLB,, | Performed by: OTOLARYNGOLOGY

## 2022-04-04 PROCEDURE — 99999 PR PBB SHADOW E&M-EST. PATIENT-LVL III: CPT | Mod: PBBFAC,,, | Performed by: OTOLARYNGOLOGY

## 2022-04-04 PROCEDURE — 1159F MED LIST DOCD IN RCRD: CPT | Mod: CPTII,S$GLB,, | Performed by: OTOLARYNGOLOGY

## 2022-04-05 PROBLEM — J35.8 TONSIL ASYMMETRY: Status: ACTIVE | Noted: 2022-04-05

## 2022-04-05 NOTE — PROGRESS NOTES
Chief Complaint   Patient presents with    consult/swollen throat       HPI   41 y.o. male presents for evaluation of tonsil with some left-sided throat irritation.  He denies ruben pain he reports occasional discomfort in this area.  He does not smoke.    Review of Systems   Constitutional: Negative for fatigue and unexpected weight change.   HENT: Per HPI.  Eyes: Negative for visual disturbance.   Respiratory: Negative for shortness of breath, hemoptysis   Cardiovascular: Negative for chest pain and palpitations.   Musculoskeletal: Negative for decreased ROM, back pain.   Skin: Negative for rash, sunburn, itching.   Neurological: Negative for dizziness and seizures.   Hematological: Negative for adenopathy. Does not bruise/bleed easily.   Endocrine: Negative for rapid weight loss/weight gain, heat/cold intolerance.     Past Medical History   Patient Active Problem List   Diagnosis    Immunodeficiency    Recurrent sinus infections    Recurrent sinusitis    Chronic rhinitis    Major depression, chronic    Chronic fatigue    GISELL (obstructive sleep apnea)    Cough    Pulmonary Mycobacterium avium complex (MAC) infection    Abnormal CT of the chest    Thyroid nodule    Thrombocytopenia    Tonsil asymmetry           Past Surgical History   Past Surgical History:   Procedure Laterality Date    APPENDECTOMY      SINUS SURGERY           Family History   Family History   Problem Relation Age of Onset    Diverticulitis Mother     Hypertension Father     Diabetes Paternal Aunt     Diabetes Paternal Uncle     Cancer Cousin         some type of intestinal cancer    Depression Brother     Melanoma Maternal Grandmother            Social History   .  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          Allergies   Review of patient's allergies indicates:  No Known Allergies        Physical Exam     Vitals:    04/04/22 0907   BP: (!) 127/91   Pulse: 98   Temp: 98 °F (36.7 °C)         Body mass index is 29.73 kg/m².      General: AOx3, NAD   Respiratory:  Symmetric chest rise, normal effort  Nose: No gross nasal septal deviation. Inferior Turbinates WNL bilaterally. No septal perforation. No masses/lesions.   Oral Cavity:  Oral Tongue mobile, no lesions noted. Hard Palate WNL. No buccal or FOM lesions.  Oropharynx:  No masses/lesions of the posterior pharyngeal wall.  Left tonsil slightly enlarged relative to the left.  There is a medially based likely benign cyst of the left tonsil.  Soft palate without masses. Midline uvula.   Neck: No scars.  No cervical lymphadenopathy, thyromegaly or thyroid nodules.  Normal range of motion.    Face: House Brackmann I bilaterally.     Assessment/Plan  Problem List Items Addressed This Visit        ENT    Tonsil asymmetry     Likely benign tonsil cyst on left.  Will assess asymmetry further was CT scan.           Relevant Orders    CT Soft Tissue Neck With Contrast

## 2022-04-07 ENCOUNTER — HOSPITAL ENCOUNTER (OUTPATIENT)
Dept: RADIOLOGY | Facility: HOSPITAL | Age: 42
Discharge: HOME | End: 2022-04-07
Attending: INTERNAL MEDICINE
Payer: COMMERCIAL

## 2022-04-07 DIAGNOSIS — R05.9 COUGH: ICD-10-CM

## 2022-04-07 PROCEDURE — 71250 CT CHEST WITHOUT CONTRAST: ICD-10-PCS | Mod: 26,,, | Performed by: RADIOLOGY

## 2022-04-07 PROCEDURE — 71250 CT THORAX DX C-: CPT | Mod: 26,,, | Performed by: RADIOLOGY

## 2022-04-07 PROCEDURE — 71250 CT THORAX DX C-: CPT | Mod: TC

## 2022-04-08 ENCOUNTER — PATIENT MESSAGE (OUTPATIENT)
Dept: INTERNAL MEDICINE | Facility: CLINIC | Age: 42
End: 2022-04-08
Payer: COMMERCIAL

## 2022-04-14 ENCOUNTER — HOSPITAL ENCOUNTER (OUTPATIENT)
Dept: RADIOLOGY | Facility: HOSPITAL | Age: 42
Discharge: HOME | End: 2022-04-14
Attending: OTOLARYNGOLOGY
Payer: COMMERCIAL

## 2022-04-14 DIAGNOSIS — J35.8 TONSIL ASYMMETRY: ICD-10-CM

## 2022-04-14 PROCEDURE — 25500020 PHARM REV CODE 255: Performed by: OTOLARYNGOLOGY

## 2022-04-14 PROCEDURE — 70491 CT SOFT TISSUE NECK W/DYE: CPT | Mod: TC

## 2022-04-14 PROCEDURE — 70491 CT SOFT TISSUE NECK W/DYE: CPT | Mod: 26,,, | Performed by: RADIOLOGY

## 2022-04-14 PROCEDURE — 70491 CT SOFT TISSUE NECK WITH CONTRAST: ICD-10-PCS | Mod: 26,,, | Performed by: RADIOLOGY

## 2022-04-14 RX ADMIN — IOHEXOL 75 ML: 350 INJECTION, SOLUTION INTRAVENOUS at 09:04

## 2022-05-13 ENCOUNTER — OFFICE VISIT (OUTPATIENT)
Dept: INTERNAL MEDICINE | Facility: CLINIC | Age: 42
End: 2022-05-13
Payer: COMMERCIAL

## 2022-05-13 VITALS
BODY MASS INDEX: 28.76 KG/M2 | WEIGHT: 236.13 LBS | TEMPERATURE: 99 F | SYSTOLIC BLOOD PRESSURE: 129 MMHG | DIASTOLIC BLOOD PRESSURE: 80 MMHG | HEIGHT: 76 IN | HEART RATE: 100 BPM

## 2022-05-13 DIAGNOSIS — J32.4 CHRONIC PANSINUSITIS: ICD-10-CM

## 2022-05-13 DIAGNOSIS — R06.09 POST-COVID CHRONIC DYSPNEA: ICD-10-CM

## 2022-05-13 DIAGNOSIS — U09.9 COVID-19 LONG HAULER MANIFESTING CHRONIC FATIGUE: ICD-10-CM

## 2022-05-13 DIAGNOSIS — A31.0 PULMONARY MYCOBACTERIUM AVIUM COMPLEX (MAC) INFECTION: ICD-10-CM

## 2022-05-13 DIAGNOSIS — G93.32 COVID-19 LONG HAULER MANIFESTING CHRONIC FATIGUE: ICD-10-CM

## 2022-05-13 DIAGNOSIS — G47.30 SEVERE SLEEP APNEA: ICD-10-CM

## 2022-05-13 DIAGNOSIS — U09.9 COVID-19 LONG HAULER MANIFESTING CHRONIC CONCENTRATION DEFICIT: Primary | ICD-10-CM

## 2022-05-13 DIAGNOSIS — R05.3 CHRONIC COUGH: ICD-10-CM

## 2022-05-13 DIAGNOSIS — U09.9 POST-COVID CHRONIC DYSPNEA: ICD-10-CM

## 2022-05-13 DIAGNOSIS — R41.840 COVID-19 LONG HAULER MANIFESTING CHRONIC CONCENTRATION DEFICIT: Primary | ICD-10-CM

## 2022-05-13 DIAGNOSIS — F32.9 MAJOR DEPRESSION, CHRONIC: ICD-10-CM

## 2022-05-13 DIAGNOSIS — J45.41 MODERATE PERSISTENT EXACERBATION OF REACTIVE AIRWAY DISEASE: ICD-10-CM

## 2022-05-13 PROCEDURE — 3079F PR MOST RECENT DIASTOLIC BLOOD PRESSURE 80-89 MM HG: ICD-10-PCS | Mod: CPTII,S$GLB,, | Performed by: INTERNAL MEDICINE

## 2022-05-13 PROCEDURE — 99215 PR OFFICE/OUTPT VISIT, EST, LEVL V, 40-54 MIN: ICD-10-PCS | Mod: S$GLB,,, | Performed by: INTERNAL MEDICINE

## 2022-05-13 PROCEDURE — 1159F MED LIST DOCD IN RCRD: CPT | Mod: CPTII,S$GLB,, | Performed by: INTERNAL MEDICINE

## 2022-05-13 PROCEDURE — 99215 OFFICE O/P EST HI 40 MIN: CPT | Mod: S$GLB,,, | Performed by: INTERNAL MEDICINE

## 2022-05-13 PROCEDURE — 99999 PR PBB SHADOW E&M-EST. PATIENT-LVL IV: CPT | Mod: PBBFAC,,, | Performed by: INTERNAL MEDICINE

## 2022-05-13 PROCEDURE — 1160F PR REVIEW ALL MEDS BY PRESCRIBER/CLIN PHARMACIST DOCUMENTED: ICD-10-PCS | Mod: CPTII,S$GLB,, | Performed by: INTERNAL MEDICINE

## 2022-05-13 PROCEDURE — 3008F PR BODY MASS INDEX (BMI) DOCUMENTED: ICD-10-PCS | Mod: CPTII,S$GLB,, | Performed by: INTERNAL MEDICINE

## 2022-05-13 PROCEDURE — 3044F PR MOST RECENT HEMOGLOBIN A1C LEVEL <7.0%: ICD-10-PCS | Mod: CPTII,S$GLB,, | Performed by: INTERNAL MEDICINE

## 2022-05-13 PROCEDURE — 1160F RVW MEDS BY RX/DR IN RCRD: CPT | Mod: CPTII,S$GLB,, | Performed by: INTERNAL MEDICINE

## 2022-05-13 PROCEDURE — 1159F PR MEDICATION LIST DOCUMENTED IN MEDICAL RECORD: ICD-10-PCS | Mod: CPTII,S$GLB,, | Performed by: INTERNAL MEDICINE

## 2022-05-13 PROCEDURE — 3079F DIAST BP 80-89 MM HG: CPT | Mod: CPTII,S$GLB,, | Performed by: INTERNAL MEDICINE

## 2022-05-13 PROCEDURE — 3044F HG A1C LEVEL LT 7.0%: CPT | Mod: CPTII,S$GLB,, | Performed by: INTERNAL MEDICINE

## 2022-05-13 PROCEDURE — 3074F PR MOST RECENT SYSTOLIC BLOOD PRESSURE < 130 MM HG: ICD-10-PCS | Mod: CPTII,S$GLB,, | Performed by: INTERNAL MEDICINE

## 2022-05-13 PROCEDURE — 3008F BODY MASS INDEX DOCD: CPT | Mod: CPTII,S$GLB,, | Performed by: INTERNAL MEDICINE

## 2022-05-13 PROCEDURE — 99999 PR PBB SHADOW E&M-EST. PATIENT-LVL IV: ICD-10-PCS | Mod: PBBFAC,,, | Performed by: INTERNAL MEDICINE

## 2022-05-13 PROCEDURE — 3074F SYST BP LT 130 MM HG: CPT | Mod: CPTII,S$GLB,, | Performed by: INTERNAL MEDICINE

## 2022-05-13 RX ORDER — METHYLPHENIDATE HYDROCHLORIDE 18 MG/1
18 TABLET ORAL DAILY
COMMUNITY
Start: 2022-05-02 | End: 2023-01-09

## 2022-05-13 RX ORDER — ALPRAZOLAM 0.25 MG/1
0.25 TABLET ORAL NIGHTLY PRN
COMMUNITY
Start: 2022-04-28 | End: 2023-09-21

## 2022-05-13 RX ORDER — BUDESONIDE AND FORMOTEROL FUMARATE DIHYDRATE 80; 4.5 UG/1; UG/1
2 AEROSOL RESPIRATORY (INHALATION) 2 TIMES DAILY
Qty: 10.2 G | Refills: 2 | Status: SHIPPED | OUTPATIENT
Start: 2022-05-13 | End: 2023-09-21

## 2022-05-13 RX ORDER — FLUTICASONE PROPIONATE 50 MCG
1 SPRAY, SUSPENSION (ML) NASAL 2 TIMES DAILY
Qty: 16 G | Refills: 1 | Status: SHIPPED | OUTPATIENT
Start: 2022-05-13 | End: 2022-06-12

## 2022-05-13 RX ORDER — IPRATROPIUM BROMIDE 42 UG/1
SPRAY, METERED NASAL
COMMUNITY
Start: 2022-01-11 | End: 2023-09-21

## 2022-05-13 RX ORDER — DOXYCYCLINE 100 MG/1
100 CAPSULE ORAL 2 TIMES DAILY
COMMUNITY
Start: 2022-01-11 | End: 2022-05-13

## 2022-05-14 NOTE — PROGRESS NOTES
Subjective:       Patient ID: Juan José Ng is a 41 y.o. male.    Chief Complaint: COVID-19 long haul    41-year-old nonsmoking gentleman here for his 1st long he had COVID in mid January of 2022. He felt fine as 1st week with just some fever but is 2nd week he continue with fever, developed a sore throat with severe fatigue and that is when the brain fog set in as well.      Pre-existing to COVID he had a past medical history significant for chronic depression with anxiety since he was 19 years of age.  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     He has a constant cough as in 2018 he was diagnosed with mycobacterium avium complex.  He has an abnormal chest x-ray and CT scan at baseline.  His cough got worse since he had COVID.  He has received 1 round of antibiotics with doxycycline when he went to urgent care in March of this year.    His cough is wet but nonproductive.  He does still have shortness of breath especially with exertion.  He no longer can exercise since she contracted COVID.  His brain fog is very bad but he is still working.    He also follows with psychiatry who has over the mid years try the different anti anxiety and antidepressants over right now he is just taking a low-dose of ADD, and fed a mean in order to get through his days.  He is not suicidal or homicidal.  He does follow with his counselor as well very regularly.    As concerning his MAC infection years ago was decided that he would not undergo treatment as the treatment would be for over a year with multiple antimicrobials that had many side effects and at that time he was fairly asymptomatic he had not visited regularly with a pulmonologist since winsome COVID.  His last CT of the chest still showed infiltrates mostly the middle right lung field.  Some small airway  disease as well.  This was done in April of this year.    Also very important in October of 2018 he was diagnosed with severe sleep apnea due to his anxiety he elected not to use the CPAP machine but is now trying to pursue it as daytime fatigue and shortness of breath with exertion is much worse post COVID.    As far as his exposure concerning with MAC infection the patient has spent his [place] from the age of 6 to the age of 13 multiple other countries as his parents were missionaries.        Review of Systems   Constitutional: Positive for fatigue. Negative for activity change, diaphoresis and fever.   HENT: Positive for sinus pressure/congestion. Negative for nasal congestion, ear pain, postnasal drip, sore throat and trouble swallowing.    Eyes: Negative for pain.   Respiratory: Positive for cough and shortness of breath. Negative for chest tightness and wheezing.    Cardiovascular: Negative for chest pain, palpitations and leg swelling.   Gastrointestinal: Negative for abdominal distention, abdominal pain, blood in stool, constipation and diarrhea.   Endocrine: Negative for cold intolerance and heat intolerance.   Genitourinary: Negative for decreased urine volume, difficulty urinating, dysuria, flank pain, frequency and hematuria.   Musculoskeletal: Negative for arthralgias, back pain, joint swelling, myalgias and neck pain.   Integumentary:  Negative for pallor, rash and wound.   Neurological: Negative for tremors, syncope, weakness, light-headedness, numbness and headaches.   Hematological: Negative for adenopathy.   Psychiatric/Behavioral: Positive for decreased concentration, dysphoric mood and sleep disturbance. Negative for confusion, hallucinations, self-injury and suicidal ideas. The patient is nervous/anxious.          Objective:      Physical Exam  Constitutional:       General: He is not in acute distress.     Appearance: Normal appearance.   HENT:      Head: Normocephalic  and atraumatic.   Eyes:      Conjunctiva/sclera: Conjunctivae normal.   Cardiovascular:      Rate and Rhythm: Normal rate and regular rhythm.      Heart sounds: Normal heart sounds.   Pulmonary:      Effort: Pulmonary effort is normal.      Breath sounds: Transmitted upper airway sounds present. Examination of the right-middle field reveals wheezing. Examination of the right-lower field reveals wheezing. Wheezing present.   Abdominal:      Palpations: Abdomen is soft.   Musculoskeletal:         General: Normal range of motion.      Cervical back: Normal range of motion and neck supple.   Skin:     General: Skin is warm and dry.   Neurological:      General: No focal deficit present.      Mental Status: He is alert.   Psychiatric:         Mood and Affect: Mood normal.         Behavior: Behavior normal.         Thought Content: Thought content normal.         Judgment: Judgment normal.         Assessment/plan       Problem List Items Addressed This Visit     Major depression, chronic    Continue to follow with psychiatry and therapy.  I think short-term ADD medicines are a good idea until your on a CPAP machine with adequate pressures and tolerating it.    Chronic fatigue    One sleep apnea is treated than the fatigue can be addressed if it is still present.    Cough- chronic    Pulmonary Mycobacterium avium complex (MAC) infection-diagnosis made by sputum culture back in 2018    Relevant Orders    Ambulatory referral/consult to Pulmonology-I think we considering treatment at this time is very appropriate.  You may need to be re-cultured with sensitivities obtained.      Other Visit Diagnoses     COVID-19 long hauler manifesting chronic concentration deficit    -  Primary    Post-COVID chronic dyspnea        Severe sleep apnea- untreated        Relevant Orders    Ambulatory referral/consult to Pulmonology    Ambulatory referral/consult to Sleep Disorders    Moderate persistent exacerbation of reactive airway disease         Relevant Medications    budesonide-formoterol 80-4.5 mcg (SYMBICORT) 80-4.5 mcg/actuation HFAA    Chronic pansinusitis        Relevant Medications    fluticasone propionate (FLONASE) 50 mcg/actuation nasal spray    Other Relevant Orders    Ambulatory referral/consult to Pulmonology          Time spent with the patient was 40 min and 50 percent of that was in face to face contact

## 2022-05-20 ENCOUNTER — PATIENT MESSAGE (OUTPATIENT)
Dept: INTERNAL MEDICINE | Facility: CLINIC | Age: 42
End: 2022-05-20
Payer: COMMERCIAL

## 2022-06-16 ENCOUNTER — OFFICE VISIT (OUTPATIENT)
Dept: PULMONOLOGY | Facility: CLINIC | Age: 42
End: 2022-06-16
Payer: COMMERCIAL

## 2022-06-16 VITALS
HEART RATE: 74 BPM | BODY MASS INDEX: 29.11 KG/M2 | HEIGHT: 76 IN | OXYGEN SATURATION: 98 % | SYSTOLIC BLOOD PRESSURE: 117 MMHG | DIASTOLIC BLOOD PRESSURE: 75 MMHG | WEIGHT: 239.06 LBS

## 2022-06-16 DIAGNOSIS — J47.9 BRONCHIECTASIS WITHOUT COMPLICATION: Primary | ICD-10-CM

## 2022-06-16 DIAGNOSIS — G47.30 SEVERE SLEEP APNEA: ICD-10-CM

## 2022-06-16 DIAGNOSIS — A31.0 MAI (MYCOBACTERIUM AVIUM-INTRACELLULARE): ICD-10-CM

## 2022-06-16 DIAGNOSIS — A31.0 PULMONARY MYCOBACTERIUM AVIUM COMPLEX (MAC) INFECTION: ICD-10-CM

## 2022-06-16 DIAGNOSIS — J32.4 CHRONIC PANSINUSITIS: ICD-10-CM

## 2022-06-16 PROCEDURE — 99205 OFFICE O/P NEW HI 60 MIN: CPT | Mod: S$GLB,,, | Performed by: INTERNAL MEDICINE

## 2022-06-16 PROCEDURE — 3008F PR BODY MASS INDEX (BMI) DOCUMENTED: ICD-10-PCS | Mod: CPTII,S$GLB,, | Performed by: INTERNAL MEDICINE

## 2022-06-16 PROCEDURE — 1159F MED LIST DOCD IN RCRD: CPT | Mod: CPTII,S$GLB,, | Performed by: INTERNAL MEDICINE

## 2022-06-16 PROCEDURE — 99205 PR OFFICE/OUTPT VISIT, NEW, LEVL V, 60-74 MIN: ICD-10-PCS | Mod: S$GLB,,, | Performed by: INTERNAL MEDICINE

## 2022-06-16 PROCEDURE — 99999 PR PBB SHADOW E&M-EST. PATIENT-LVL IV: CPT | Mod: PBBFAC,,, | Performed by: INTERNAL MEDICINE

## 2022-06-16 PROCEDURE — 3074F PR MOST RECENT SYSTOLIC BLOOD PRESSURE < 130 MM HG: ICD-10-PCS | Mod: CPTII,S$GLB,, | Performed by: INTERNAL MEDICINE

## 2022-06-16 PROCEDURE — 1159F PR MEDICATION LIST DOCUMENTED IN MEDICAL RECORD: ICD-10-PCS | Mod: CPTII,S$GLB,, | Performed by: INTERNAL MEDICINE

## 2022-06-16 PROCEDURE — 3044F HG A1C LEVEL LT 7.0%: CPT | Mod: CPTII,S$GLB,, | Performed by: INTERNAL MEDICINE

## 2022-06-16 PROCEDURE — 3074F SYST BP LT 130 MM HG: CPT | Mod: CPTII,S$GLB,, | Performed by: INTERNAL MEDICINE

## 2022-06-16 PROCEDURE — 3044F PR MOST RECENT HEMOGLOBIN A1C LEVEL <7.0%: ICD-10-PCS | Mod: CPTII,S$GLB,, | Performed by: INTERNAL MEDICINE

## 2022-06-16 PROCEDURE — 99999 PR PBB SHADOW E&M-EST. PATIENT-LVL IV: ICD-10-PCS | Mod: PBBFAC,,, | Performed by: INTERNAL MEDICINE

## 2022-06-16 PROCEDURE — 3008F BODY MASS INDEX DOCD: CPT | Mod: CPTII,S$GLB,, | Performed by: INTERNAL MEDICINE

## 2022-06-16 PROCEDURE — 3078F DIAST BP <80 MM HG: CPT | Mod: CPTII,S$GLB,, | Performed by: INTERNAL MEDICINE

## 2022-06-16 PROCEDURE — 3078F PR MOST RECENT DIASTOLIC BLOOD PRESSURE < 80 MM HG: ICD-10-PCS | Mod: CPTII,S$GLB,, | Performed by: INTERNAL MEDICINE

## 2022-06-16 RX ORDER — FLUTICASONE FUROATE, UMECLIDINIUM BROMIDE AND VILANTEROL TRIFENATATE 200; 62.5; 25 UG/1; UG/1; UG/1
1 POWDER RESPIRATORY (INHALATION) DAILY
Qty: 60 EACH | Refills: 11 | Status: SHIPPED | OUTPATIENT
Start: 2022-06-16 | End: 2023-09-21

## 2022-06-16 RX ORDER — ALBUTEROL SULFATE 90 UG/1
AEROSOL, METERED RESPIRATORY (INHALATION)
Qty: 18 G | Refills: 11 | Status: SHIPPED | OUTPATIENT
Start: 2022-06-16 | End: 2023-09-21

## 2022-06-16 NOTE — PATIENT INSTRUCTIONS
Check for mac resistance,  would start treatment once culture show sensitive. Expect may need treatment for 18 months, need to treat 1 yr after cultures negative.    You have bronchiectasis with 1/4 cup bronchorrhea daily,  Check for regular germs in lungs, psuedomonas likely    For pulmonary toilet-- use trelegy once daily or symbicort  regular use medication,and use aerobika -- might use vest if recurrent significant lung infections.  Use 15 breaths after trelegy or symbicort or albuterol twice daily    May treat infection if present.      May need sleep study.  You are to call if help needed.     Recheck in 3 months, may do by phone.

## 2022-06-16 NOTE — PROGRESS NOTES
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  .  Care in past limited by lack of coverage at times.    Pt had had severe freq sinus and lung infections. Looks like he had good ig levels in 2015 but had only 2/14 protection to pneumococcal serotypes.  He had 12/14 protection 2 months later appatently in response to vaccine. 2018 MAC was cultur and sensitive to macrolides. No rx.      Pt produces 1/4 cup yellow mucous daily, and has freq wheezes with no carballo.  Ct chest viewed from 4/7 2022 with bronchiectasis apparent - more impressive on 1/2/2019 ct chest.  Tree in bud nodules seen but looked less in 4/2022 than prior.      Pt has severe sinus still with occ abx but vague.  Was given symbicort recently.  No limits.         The chief compliant  problem is new to me",   PFSH:  Past Medical History:   Diagnosis Date    Allergy     Anxiety     Depression     Hx of psychiatric care     Major depression, chronic 7/6/2017    Psychiatric problem     Recurrent upper respiratory infection (URI)     Therapy          Past Surgical History:   Procedure Laterality Date    APPENDECTOMY      SINUS SURGERY       Social History     Tobacco Use    Smoking status: Never Smoker    Smokeless tobacco: Never Used   Substance Use Topics    Alcohol use: No    Drug use: No     Family History   Problem Relation Age of Onset    Diverticulitis Mother     Hypertension Father     Diabetes Paternal Aunt     Diabetes Paternal Uncle     Cancer Cousin         some type of intestinal cancer    Depression Brother     Melanoma Maternal Grandmother      Review of patient's allergies indicates:  No Known Allergies    Performance Status:The patient's activity level is functions out of house.      Review of Systems:  a review of eleven systems covering constitutional, Eye, HEENT, Psych, Respiratory, Cardiac, GI, , " "Musculoskeletal, Endocrine, Dermatologic was negative except for pertinent findings as listed ABOVE and below:  pertinent positive as above, rest is good       Exam:Comprehensive exam done. /75 (BP Location: Left arm, Patient Position: Sitting, BP Method: Medium (Automatic))   Pulse 74   Ht 6' 4" (1.93 m)   Wt 108.4 kg (239 lb 1.4 oz)   SpO2 98% Comment: on room air at rest  BMI 29.10 kg/m²   Exam included Vitals as listed, and patient's appearance and affect and alertness and mood, oral exam for yeast and hygiene and pharynx lesions and Mallapatti (M) score, neck with inspection for jvd and masses and thyroid abnormalities and lymph nodes (supraclavicular and infraclavicular nodes and axillary also examined and noted if abn), chest exam included symmetry and effort and fremitus and percussion and auscultation, cardiac exam included rhythm and gallops and murmur and rubs and jvd and edema, abdominal exam for mass and hepatosplenomegaly and tenderness and hernias and bowel sounds, Musculoskeletal exam with muscle tone and posture and mobility/gait and  strength, and skin for rashes and cyanosis and pallor and turgor, extremity for clubbing.  Findings were normal except for pertinent findings listed below:  M3, occ wheeze but min ,chest is symmetric, no distress, normal percussion, normal fremitus and rest good               Radiographs (ct chest and cxr) reviewed: view by direct vision  As above    Labs reviewed           PFT will be done and results to be reviewed later      Plan:  Clinical impression is apparently straight forward and impression with management as below.     Juan José was seen today for abnormal ct scan.    Diagnoses and all orders for this visit:    Bronchiectasis without complication  The following orders have not been finalized:  -     albuterol (PROVENTIL/VENTOLIN HFA) 90 mcg/actuation inhaler  -     fluticasone-umeclidin-vilanter (TRELEGY ELLIPTA) 200-62.5-25 mcg inhaler    Severe " sleep apnea- untreated  -     Ambulatory referral/consult to Pulmonology    Pulmonary Mycobacterium avium complex (MAC) infection  -     Ambulatory referral/consult to Pulmonology    Chronic pansinusitis  -     Ambulatory referral/consult to Pulmonology    SEAN (mycobacterium avium-intracellulare)        Follow up in about 2 months (around 8/16/2022), or if symptoms worsen or fail to improve.    Discussed with patient above for education the following:      Patient Instructions   Check for mac resistance,  would start treatment once culture show sensitive.     Check for regular germs in lungs, psuedomonas likely    Use trelegy once daily or symbicort    Use aerobika -- might use vest if recurrent significant lung infections.  Use 15 breaths after trelegy or symbicort or albuterol twice daily    May treat infection if present.

## 2022-06-21 ENCOUNTER — LAB VISIT (OUTPATIENT)
Dept: LAB | Facility: HOSPITAL | Age: 42
End: 2022-06-21
Attending: INTERNAL MEDICINE
Payer: COMMERCIAL

## 2022-06-21 DIAGNOSIS — J47.9 BRONCHIECTASIS WITHOUT COMPLICATION: ICD-10-CM

## 2022-06-21 DIAGNOSIS — A31.0 MAI (MYCOBACTERIUM AVIUM-INTRACELLULARE): ICD-10-CM

## 2022-06-21 PROCEDURE — 87206 SMEAR FLUORESCENT/ACID STAI: CPT | Performed by: INTERNAL MEDICINE

## 2022-06-21 PROCEDURE — 87015 SPECIMEN INFECT AGNT CONCNTJ: CPT | Performed by: INTERNAL MEDICINE

## 2022-06-21 PROCEDURE — 87116 MYCOBACTERIA CULTURE: CPT | Performed by: INTERNAL MEDICINE

## 2022-06-24 ENCOUNTER — LAB VISIT (OUTPATIENT)
Dept: LAB | Facility: HOSPITAL | Age: 42
End: 2022-06-24
Attending: INTERNAL MEDICINE
Payer: COMMERCIAL

## 2022-06-24 DIAGNOSIS — J47.9 BRONCHIECTASIS WITHOUT COMPLICATION: ICD-10-CM

## 2022-06-24 DIAGNOSIS — A31.0 MAI (MYCOBACTERIUM AVIUM-INTRACELLULARE): ICD-10-CM

## 2022-06-24 PROCEDURE — 87015 SPECIMEN INFECT AGNT CONCNTJ: CPT | Performed by: INTERNAL MEDICINE

## 2022-06-24 PROCEDURE — 87206 SMEAR FLUORESCENT/ACID STAI: CPT | Performed by: INTERNAL MEDICINE

## 2022-06-24 PROCEDURE — 87116 MYCOBACTERIA CULTURE: CPT | Performed by: INTERNAL MEDICINE

## 2022-07-01 ENCOUNTER — LAB VISIT (OUTPATIENT)
Dept: LAB | Facility: HOSPITAL | Age: 42
End: 2022-07-01
Attending: INTERNAL MEDICINE
Payer: COMMERCIAL

## 2022-07-01 DIAGNOSIS — A31.0 MAI (MYCOBACTERIUM AVIUM-INTRACELLULARE): ICD-10-CM

## 2022-07-01 DIAGNOSIS — J47.9 BRONCHIECTASIS WITHOUT COMPLICATION: ICD-10-CM

## 2022-07-01 PROCEDURE — 87206 SMEAR FLUORESCENT/ACID STAI: CPT | Performed by: INTERNAL MEDICINE

## 2022-07-01 PROCEDURE — 87116 MYCOBACTERIA CULTURE: CPT | Performed by: INTERNAL MEDICINE

## 2022-07-01 PROCEDURE — 87015 SPECIMEN INFECT AGNT CONCNTJ: CPT | Performed by: INTERNAL MEDICINE

## 2022-08-09 LAB
ACID FAST MOD KINY STN SPEC: NORMAL
MYCOBACTERIUM SPEC QL CULT: NORMAL

## 2022-08-19 LAB
ACID FAST MOD KINY STN SPEC: NORMAL
MYCOBACTERIUM SPEC QL CULT: NORMAL

## 2022-09-21 ENCOUNTER — OFFICE VISIT (OUTPATIENT)
Dept: PULMONOLOGY | Facility: CLINIC | Age: 42
End: 2022-09-21
Payer: COMMERCIAL

## 2022-09-21 DIAGNOSIS — A31.0 MAI (MYCOBACTERIUM AVIUM-INTRACELLULARE): ICD-10-CM

## 2022-09-21 DIAGNOSIS — J47.9 BRONCHIECTASIS WITHOUT COMPLICATION: Primary | ICD-10-CM

## 2022-09-21 PROCEDURE — 99213 OFFICE O/P EST LOW 20 MIN: CPT | Mod: 95,,, | Performed by: INTERNAL MEDICINE

## 2022-09-21 PROCEDURE — 3044F PR MOST RECENT HEMOGLOBIN A1C LEVEL <7.0%: ICD-10-PCS | Mod: CPTII,95,, | Performed by: INTERNAL MEDICINE

## 2022-09-21 PROCEDURE — 99213 PR OFFICE/OUTPT VISIT, EST, LEVL III, 20-29 MIN: ICD-10-PCS | Mod: 95,,, | Performed by: INTERNAL MEDICINE

## 2022-09-21 PROCEDURE — 3044F HG A1C LEVEL LT 7.0%: CPT | Mod: CPTII,95,, | Performed by: INTERNAL MEDICINE

## 2022-09-21 NOTE — PATIENT INSTRUCTIONS
Would check regular cultures now and repeat in future.  Standing order for every 2 wk sputum cultures placed.  Need to do now and we need to contact you to instruct to use special antibiotic if needed  or use doxycycline.        Will place standing order to re submit sputum for MAC next yr.  Your positive MAC culture in 2018 may have been contaminate.      Would check pulmonary function once lungs treated for infection if needed.    You should stay on symbicort or trelegy and use aerobika-- we discuss cleaning aerobika.         Chronic sinus drainage  may respond to doxycyline -- await culture lung mucous       Could give vest trail if you are in [place]-- would be best after sputum cleared.    Need to get back after sputum culture to instruct specific antibiotic or doxycycline --- will plan to do phone f/u 8 wks.

## 2022-09-21 NOTE — PROGRESS NOTES
Established Patient - Audio Only Telehealth Visit     The patient location is: home   The chief complaint leading to consultation is: bronchiectasis  Visit type: Virtual visit with audio only (telephone)  Total time spent with patient: 15min       The reason for the audio only service rather than synchronous audio and video virtual visit was related to technical difficulties or patient preference/necessity.     Each patient to whom I provide medical services by telemedicine is:  (1) informed of the relationship between the physician and patient and the respective role of any other health care provider with respect to management of the patient; and (2) notified that they may decline to receive medical services by telemedicine and may withdraw from such care at any time. Patient verbally consented to receive this service via voice-only telephone call.      9/21/2022    Juan José Ng  New Patient Consult    No chief complaint on file.      HPI:     9/21/2022 mucous min yellow colored.  Lost trelegy/symbicort-- pt still 1/4 cup daily.    Afb neg x 3, mac + once in 2018.  Uses symbicort/trelegy but lost in recent move, uses aerobika.  Mucous similar .            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  .  Care in past limited by lack of coverage at times.    Pt had had severe freq sinus and lung infections. Looks like he had good ig levels in 2015 but had only 2/14 protection to pneumococcal serotypes.  He had 12/14 protection 2 months later appatently in response to vaccine. 2018 MAC was cultur and sensitive to macrolides. No rx.      Pt produces 1/4 cup yellow mucous daily, and has freq wheezes with no carballo.  Ct chest viewed from 4/7 2022 with bronchiectasis apparent - more impressive on 1/2/2019 ct chest.  Tree in bud nodules seen but looked less in 4/2022 than prior.      Pt has severe sinus still with occ abx but vague.  Was given symbicort recently.  No limits.   Patient Instructions   Check for mac  "resistance,  would start treatment once culture show sensitive.     Check for regular germs in lungs, psuedomonas likely    Use trelegy once daily or symbicort    Use aerobika -- might use vest if recurrent significant lung infections.  Use 15 breaths after trelegy or symbicort or albuterol twice daily    May treat infection if present.                The chief compliant  problem is new to me",   PFSH:  Past Medical History:   Diagnosis Date    Allergy     Anxiety     Depression     Hx of psychiatric care     Major depression, chronic 7/6/2017    Psychiatric problem     Recurrent upper respiratory infection (URI)     Therapy          Past Surgical History:   Procedure Laterality Date    APPENDECTOMY      SINUS SURGERY       Social History     Tobacco Use    Smoking status: Never    Smokeless tobacco: Never   Substance Use Topics    Alcohol use: No    Drug use: No     Family History   Problem Relation Age of Onset    Diverticulitis Mother     Hypertension Father     Diabetes Paternal Aunt     Diabetes Paternal Uncle     Cancer Cousin         some type of intestinal cancer    Depression Brother     Melanoma Maternal Grandmother      Review of patient's allergies indicates:  No Known Allergies    Performance Status:The patient's activity level is functions out of house.      Review of Systems:  a review of eleven systems covering constitutional, Eye, HEENT, Psych, Respiratory, Cardiac, GI, , Musculoskeletal, Endocrine, Dermatologic was negative except for pertinent findings as listed ABOVE and below:  pertinent positive as above, rest is good       Exam:Comprehensive exam done. There were no vitals taken for this visit.  Exam included Vitals as listed, and patient's appearance and affect and alertness and mood, oral exam for yeast and hygiene and pharynx lesions and Mallapatti (M) score, neck with inspection for jvd and masses and thyroid abnormalities and lymph nodes (supraclavicular and infraclavicular nodes and " axillary also examined and noted if abn), chest exam included symmetry and effort and fremitus and percussion and auscultation, cardiac exam included rhythm and gallops and murmur and rubs and jvd and edema, abdominal exam for mass and hepatosplenomegaly and tenderness and hernias and bowel sounds, Musculoskeletal exam with muscle tone and posture and mobility/gait and  strength, and skin for rashes and cyanosis and pallor and turgor, extremity for clubbing.  Findings were normal except for pertinent findings listed below:  M3, occ wheeze but min ,chest is symmetric, no distress, normal percussion, normal fremitus and rest good               Radiographs (ct chest and cxr) reviewed: view by direct vision  As above    Labs reviewed           PFT will be done and results to be reviewed later      Plan:  Clinical impression is apparently straight forward and impression with management as below.     Diagnoses and all orders for this visit:    Bronchiectasis without complication  -     Culture, Respiratory with Gram Stain; Standing    SEAN (mycobacterium avium-intracellulare)  -     AFB Culture & Smear; Standing        Follow up in about 8 weeks (around 11/16/2022).    Discussed with patient above for education the following:      Patient Instructions   Would check regular cultures now and repeat in future.  Standing order for every 2 wk sputum cultures placed.  Need to do now and we need to contact you to instruct to use special antibiotic if needed  or use doxycycline.        Will place standing order to re submit sputum for MAC next yr.  Your positive MAC culture in 2018 may have been contaminate.      Would check pulmonary function once lungs treated for infection if needed.    You should stay on symbicort or trelegy and use aerobika-- we discuss cleaning aerobika.         Chronic sinus drainage  may respond to doxycyline -- await culture lung mucous       Could give vest trail if you are in [place]-- would be best  after sputum cleared.    Need to get back after sputum culture to instruct specific antibiotic or doxycycline --- will plan to do phone f/u 8 wks.                      This service was not originating from a related E/M service provided within the previous 7 days nor will  to an E/M service or procedure within the next 24 hours or my soonest available appointment.  Prevailing standard of care was able to be met in this audio-only visit.

## 2022-09-22 ENCOUNTER — OFFICE VISIT (OUTPATIENT)
Dept: INTERNAL MEDICINE | Facility: CLINIC | Age: 42
End: 2022-09-22
Payer: COMMERCIAL

## 2022-09-22 VITALS
HEART RATE: 81 BPM | SYSTOLIC BLOOD PRESSURE: 116 MMHG | HEIGHT: 76 IN | WEIGHT: 246.5 LBS | BODY MASS INDEX: 30.02 KG/M2 | DIASTOLIC BLOOD PRESSURE: 80 MMHG | OXYGEN SATURATION: 98 %

## 2022-09-22 DIAGNOSIS — J47.9 BRONCHIECTASIS WITHOUT COMPLICATION: ICD-10-CM

## 2022-09-22 DIAGNOSIS — R93.89 ABNORMAL CT OF THE CHEST: ICD-10-CM

## 2022-09-22 DIAGNOSIS — Z13.1 SCREENING FOR DIABETES MELLITUS: ICD-10-CM

## 2022-09-22 DIAGNOSIS — D69.6 THROMBOCYTOPENIA: Primary | ICD-10-CM

## 2022-09-22 DIAGNOSIS — G47.33 OSA (OBSTRUCTIVE SLEEP APNEA): ICD-10-CM

## 2022-09-22 DIAGNOSIS — J32.4 CHRONIC PANSINUSITIS: ICD-10-CM

## 2022-09-22 DIAGNOSIS — E04.1 THYROID NODULE: ICD-10-CM

## 2022-09-22 DIAGNOSIS — Z13.220 LIPID SCREENING: ICD-10-CM

## 2022-09-22 DIAGNOSIS — A31.0 MAI (MYCOBACTERIUM AVIUM-INTRACELLULARE): ICD-10-CM

## 2022-09-22 DIAGNOSIS — J35.8 TONSIL ASYMMETRY: ICD-10-CM

## 2022-09-22 PROCEDURE — 3044F HG A1C LEVEL LT 7.0%: CPT | Mod: CPTII,S$GLB,, | Performed by: INTERNAL MEDICINE

## 2022-09-22 PROCEDURE — 3079F PR MOST RECENT DIASTOLIC BLOOD PRESSURE 80-89 MM HG: ICD-10-PCS | Mod: CPTII,S$GLB,, | Performed by: INTERNAL MEDICINE

## 2022-09-22 PROCEDURE — 1160F RVW MEDS BY RX/DR IN RCRD: CPT | Mod: CPTII,S$GLB,, | Performed by: INTERNAL MEDICINE

## 2022-09-22 PROCEDURE — 3044F PR MOST RECENT HEMOGLOBIN A1C LEVEL <7.0%: ICD-10-PCS | Mod: CPTII,S$GLB,, | Performed by: INTERNAL MEDICINE

## 2022-09-22 PROCEDURE — 3008F BODY MASS INDEX DOCD: CPT | Mod: CPTII,S$GLB,, | Performed by: INTERNAL MEDICINE

## 2022-09-22 PROCEDURE — 1159F MED LIST DOCD IN RCRD: CPT | Mod: CPTII,S$GLB,, | Performed by: INTERNAL MEDICINE

## 2022-09-22 PROCEDURE — 3074F PR MOST RECENT SYSTOLIC BLOOD PRESSURE < 130 MM HG: ICD-10-PCS | Mod: CPTII,S$GLB,, | Performed by: INTERNAL MEDICINE

## 2022-09-22 PROCEDURE — 3074F SYST BP LT 130 MM HG: CPT | Mod: CPTII,S$GLB,, | Performed by: INTERNAL MEDICINE

## 2022-09-22 PROCEDURE — 99214 PR OFFICE/OUTPT VISIT, EST, LEVL IV, 30-39 MIN: ICD-10-PCS | Mod: S$GLB,,, | Performed by: INTERNAL MEDICINE

## 2022-09-22 PROCEDURE — 1160F PR REVIEW ALL MEDS BY PRESCRIBER/CLIN PHARMACIST DOCUMENTED: ICD-10-PCS | Mod: CPTII,S$GLB,, | Performed by: INTERNAL MEDICINE

## 2022-09-22 PROCEDURE — 1159F PR MEDICATION LIST DOCUMENTED IN MEDICAL RECORD: ICD-10-PCS | Mod: CPTII,S$GLB,, | Performed by: INTERNAL MEDICINE

## 2022-09-22 PROCEDURE — 3008F PR BODY MASS INDEX (BMI) DOCUMENTED: ICD-10-PCS | Mod: CPTII,S$GLB,, | Performed by: INTERNAL MEDICINE

## 2022-09-22 PROCEDURE — 99214 OFFICE O/P EST MOD 30 MIN: CPT | Mod: S$GLB,,, | Performed by: INTERNAL MEDICINE

## 2022-09-22 PROCEDURE — 99999 PR PBB SHADOW E&M-EST. PATIENT-LVL III: CPT | Mod: PBBFAC,,, | Performed by: INTERNAL MEDICINE

## 2022-09-22 PROCEDURE — 99999 PR PBB SHADOW E&M-EST. PATIENT-LVL III: ICD-10-PCS | Mod: PBBFAC,,, | Performed by: INTERNAL MEDICINE

## 2022-09-22 PROCEDURE — 3079F DIAST BP 80-89 MM HG: CPT | Mod: CPTII,S$GLB,, | Performed by: INTERNAL MEDICINE

## 2022-09-22 NOTE — PROGRESS NOTES
INTERNAL MEDICINE ESTABLISHED PATIENT VISIT NOTE    Subjective:     Chief Complaint: Follow-up  Fatigue, sinus issues     Patient ID: Juan José Ng is a 41 y.o. male with chronic rhinitis (see last note for details, has had sinus surgery in the past and followed by ID, allergy, and pulm in the past c neg immunodeficiency w/u, normal PFTs, normal CT sinuses/scope), depression (failed tx c cymbalta, lexapro, zoloft 2/2 inefficacy or s/e), last seen by me in March, here today for 6 mo f/u.    Since last visit, was seen by ENT for issues c chronic throat irritation.  ENT suspected tonsil cyst so CT done which showed L sided tonsilliths c tonsillar cyst.    Was also seen in COVID clinic in May who rec f/u c Pulm.  Seen by Dr. Burleson/Pulm in June who did testing for MAC resistance c plan to start tx pending resuts.    Past Medical History:  Past Medical History:   Diagnosis Date    Allergy     Anxiety     Depression     Hx of psychiatric care     Major depression, chronic 7/6/2017    Psychiatric problem     Recurrent upper respiratory infection (URI)     Therapy        Home Medications:  Prior to Admission medications    Medication Sig Start Date End Date Taking? Authorizing Provider   albuterol (PROVENTIL/VENTOLIN HFA) 90 mcg/actuation inhaler 2 puffs every 4 hours as needed for cough, wheeze, or shortness of breath 6/16/22   Ryan Burleson MD   ALPRAZolam (XANAX) 0.25 MG tablet Take 0.25 mg by mouth nightly as needed. 4/28/22   Historical Provider   azelastine (ASTELIN) 137 mcg (0.1 %) nasal spray 2 sprays (274 mcg total) by Nasal route 2 (two) times daily. 3/21/22 3/21/23  Lina Silva MD   budesonide-formoterol 80-4.5 mcg (SYMBICORT) 80-4.5 mcg/actuation HFAA Inhale 2 puffs into the lungs 2 (two) times daily. Controller 5/13/22 5/13/23  Nhi Mack MD   fluticasone-umeclidin-vilanter (TRELEGY ELLIPTA) 200-62.5-25 mcg inhaler Inhale 1 puff into the lungs once daily. 6/16/22   Ryan Burleson MD   ipratropium  "(ATROVENT) 42 mcg (0.06 %) nasal spray SMARTSIG:Both Nares 1/11/22   Historical Provider   methylphenidate HCl (RITALIN) 10 MG tablet Take 10 mg by mouth once daily. 11/1/21   Historical Provider   methylphenidate HCl 18 MG CR tablet Take 18 mg by mouth once daily. 5/2/22   Historical Provider       Allergies:  Review of patient's allergies indicates:  No Known Allergies    Social History:  Social History     Tobacco Use    Smoking status: Never    Smokeless tobacco: Never   Substance Use Topics    Alcohol use: No    Drug use: No        Review of Systems   Constitutional:  Negative for chills, fatigue and fever.   Respiratory:  Negative for cough, chest tightness and shortness of breath.    Cardiovascular:  Negative for chest pain.   Gastrointestinal:  Negative for abdominal pain and blood in stool.   Genitourinary:  Negative for dysuria and frequency.       Health Maintenance:     Immunizations:   Influenza 11/2019, declined today.  TDap 6/2017  Prevnar 12/2019, Pneumovax 2/2021  COVID 2/2021, 3/2021 Pfizer, advised to schedule repeat.     [place]  [facility] [place]    Objective:   /80 (BP Location: Left arm, Patient Position: Sitting, BP Method: Medium (Manual))   Pulse 81   Ht 6' 4" (1.93 m)   Wt 111.8 kg (246 lb 7.6 oz)   SpO2 98%   BMI 30.00 kg/m²        General: AAO x3, no apparent distress  HEENT: PERRL, OP clear  CV: RRR, no m/r/g  Pulm: Lungs CTAB, no crackles, no wheezes  Abd: s/NT/ND +BS  Extremities: no c/c/e    Labs:     Lab Results   Component Value Date    WBC 4.71 03/21/2022    HGB 14.7 03/21/2022    HCT 45.4 03/21/2022    MCV 90 03/21/2022     03/21/2022     Sodium   Date Value Ref Range Status   03/21/2022 142 136 - 145 mmol/L Final     Potassium   Date Value Ref Range Status   03/21/2022 4.1 3.5 - 5.1 mmol/L Final     Chloride   Date Value Ref Range Status   03/21/2022 105 95 - 110 mmol/L Final     CO2   Date Value Ref Range Status   03/21/2022 28 23 - 29 mmol/L Final "     Glucose   Date Value Ref Range Status   03/21/2022 86 70 - 110 mg/dL Final     BUN   Date Value Ref Range Status   03/21/2022 14 6 - 20 mg/dL Final     Creatinine   Date Value Ref Range Status   03/21/2022 0.9 0.5 - 1.4 mg/dL Final     Calcium   Date Value Ref Range Status   03/21/2022 9.6 8.7 - 10.5 mg/dL Final     Total Protein   Date Value Ref Range Status   03/21/2022 7.6 6.0 - 8.4 g/dL Final     Albumin   Date Value Ref Range Status   03/21/2022 4.3 3.5 - 5.2 g/dL Final     Total Bilirubin   Date Value Ref Range Status   03/21/2022 1.2 (H) 0.1 - 1.0 mg/dL Final     Comment:     For infants and newborns, interpretation of results should be based  on gestational age, weight and in agreement with clinical  observations.    Premature Infant recommended reference ranges:  Up to 24 hours.............<8.0 mg/dL  Up to 48 hours............<12.0 mg/dL  3-5 days..................<15.0 mg/dL  6-29 days.................<15.0 mg/dL       Alkaline Phosphatase   Date Value Ref Range Status   03/21/2022 73 55 - 135 U/L Final     AST   Date Value Ref Range Status   03/21/2022 33 10 - 40 U/L Final     ALT   Date Value Ref Range Status   03/21/2022 41 10 - 44 U/L Final     Anion Gap   Date Value Ref Range Status   03/21/2022 9 8 - 16 mmol/L Final     eGFR if    Date Value Ref Range Status   03/21/2022 >60.0 >60 mL/min/1.73 m^2 Final     eGFR if non    Date Value Ref Range Status   03/21/2022 >60.0 >60 mL/min/1.73 m^2 Final     Comment:     Calculation used to obtain the estimated glomerular filtration  rate (eGFR) is the CKD-EPI equation.        Lab Results   Component Value Date    HGBA1C 5.2 03/21/2022     Lab Results   Component Value Date    LDLCALC 99.8 03/21/2022     Lab Results   Component Value Date    TSH 1.423 03/21/2022         Assessment/Plan     Juan José was seen today for follow-up.    Diagnoses and all orders for this visit:    Thrombocytopenia  Appears chronic and normalized on last  check earlier this year  Prior w/u unrevealing.  Can check annually.    Lab Results   Component Value Date    WOMSIGQN49 525 07/19/2018     Lab Results   Component Value Date    FOLATE 14.5 07/19/2018       Lab Results   Component Value Date    YZF62DBBP Negative 07/10/2017     Lab Results   Component Value Date    HEPAIGM Negative 07/10/2017    HEPBIGM Negative 07/10/2017    HEPCAB Negative 07/10/2017     -     CBC Auto Differential; Future    Chronic pansinusitis  As per HPI  Cont meds as per ENT/Allergy    Tonsil asymmetry  Seen by ENT who noted conservative monitoring as long as asymptomatic.  Not bothersome currently.    Thyroid nodule  Last u/s 3/2022 c stable nodules and rec f/u 6/2024 per Radiology recs  Will monitor.  -     TSH; Future    SEAN (mycobacterium avium-intracellulare)  Abnormal CT of the chest  Bronchiectasis without complication  As per HPI  Followed by pulm, awaiting repeat cx    GISELL (obstructive sleep apnea)  Did not tolerate CPAP, has tried various masks  States side sleeping has helped which we will continue    Screening for diabetes mellitus  -     Comprehensive Metabolic Panel; Future  -     Hemoglobin A1C; Future    Lipid screening  -     Lipid Panel; Future      HM as above  RTC in 6 mos for annual with fasting labs prior, sooner if needed.    Lina Silva MD  Department of Internal Medicine - [facility]  09/26/2022

## 2022-09-23 ENCOUNTER — PATIENT MESSAGE (OUTPATIENT)
Dept: PULMONOLOGY | Facility: CLINIC | Age: 42
End: 2022-09-23
Payer: COMMERCIAL

## 2022-12-28 ENCOUNTER — PATIENT MESSAGE (OUTPATIENT)
Dept: INTERNAL MEDICINE | Facility: CLINIC | Age: 42
End: 2022-12-28
Payer: COMMERCIAL

## 2022-12-29 DIAGNOSIS — Z30.09 VASECTOMY EVALUATION: Primary | ICD-10-CM

## 2023-01-09 ENCOUNTER — OFFICE VISIT (OUTPATIENT)
Dept: UROLOGY | Facility: CLINIC | Age: 43
End: 2023-01-09
Payer: COMMERCIAL

## 2023-01-09 VITALS
HEIGHT: 76 IN | SYSTOLIC BLOOD PRESSURE: 121 MMHG | DIASTOLIC BLOOD PRESSURE: 84 MMHG | WEIGHT: 242.5 LBS | HEART RATE: 90 BPM | BODY MASS INDEX: 29.53 KG/M2

## 2023-01-09 DIAGNOSIS — Z30.09 VASECTOMY EVALUATION: ICD-10-CM

## 2023-01-09 PROCEDURE — 1159F PR MEDICATION LIST DOCUMENTED IN MEDICAL RECORD: ICD-10-PCS | Mod: CPTII,S$GLB,, | Performed by: UROLOGY

## 2023-01-09 PROCEDURE — 99999 PR PBB SHADOW E&M-EST. PATIENT-LVL IV: ICD-10-PCS | Mod: PBBFAC,,, | Performed by: UROLOGY

## 2023-01-09 PROCEDURE — 1159F MED LIST DOCD IN RCRD: CPT | Mod: CPTII,S$GLB,, | Performed by: UROLOGY

## 2023-01-09 PROCEDURE — 3008F BODY MASS INDEX DOCD: CPT | Mod: CPTII,S$GLB,, | Performed by: UROLOGY

## 2023-01-09 PROCEDURE — 99203 PR OFFICE/OUTPT VISIT, NEW, LEVL III, 30-44 MIN: ICD-10-PCS | Mod: S$GLB,,, | Performed by: UROLOGY

## 2023-01-09 PROCEDURE — 3079F DIAST BP 80-89 MM HG: CPT | Mod: CPTII,S$GLB,, | Performed by: UROLOGY

## 2023-01-09 PROCEDURE — 99999 PR PBB SHADOW E&M-EST. PATIENT-LVL IV: CPT | Mod: PBBFAC,,, | Performed by: UROLOGY

## 2023-01-09 PROCEDURE — 3074F SYST BP LT 130 MM HG: CPT | Mod: CPTII,S$GLB,, | Performed by: UROLOGY

## 2023-01-09 PROCEDURE — 3008F PR BODY MASS INDEX (BMI) DOCUMENTED: ICD-10-PCS | Mod: CPTII,S$GLB,, | Performed by: UROLOGY

## 2023-01-09 PROCEDURE — 99203 OFFICE O/P NEW LOW 30 MIN: CPT | Mod: S$GLB,,, | Performed by: UROLOGY

## 2023-01-09 PROCEDURE — 3079F PR MOST RECENT DIASTOLIC BLOOD PRESSURE 80-89 MM HG: ICD-10-PCS | Mod: CPTII,S$GLB,, | Performed by: UROLOGY

## 2023-01-09 PROCEDURE — 3074F PR MOST RECENT SYSTOLIC BLOOD PRESSURE < 130 MM HG: ICD-10-PCS | Mod: CPTII,S$GLB,, | Performed by: UROLOGY

## 2023-01-09 RX ORDER — DIAZEPAM 10 MG/1
TABLET ORAL
Qty: 2 TABLET | Refills: 0 | Status: SHIPPED | OUTPATIENT
Start: 2023-01-09 | End: 2023-09-21

## 2023-01-09 NOTE — PROGRESS NOTES
Subjective:       Patient ID: Juan José Ng is a 42 y.o. male.    Chief Complaint: Advice Only (Vasectomy Consult )    HPI patient is here for vasectomy.  He has no children and does not wish to have any.  He notes that it is considered irreversible    Past Medical History:   Diagnosis Date    Allergy     Anxiety     Depression     Hx of psychiatric care     Major depression, chronic 7/6/2017    Psychiatric problem     Recurrent upper respiratory infection (URI)     Therapy        Past Surgical History:   Procedure Laterality Date    APPENDECTOMY      SINUS SURGERY         Family History   Problem Relation Age of Onset    Diverticulitis Mother     Hypertension Father     Diabetes Paternal Aunt     Diabetes Paternal Uncle     Cancer Cousin         some type of intestinal cancer    Depression Brother     Melanoma Maternal Grandmother        Social History     Socioeconomic History    Marital status:    Tobacco Use    Smoking status: Never    Smokeless tobacco: Never   Substance and Sexual Activity    Alcohol use: No    Drug use: No    Sexual activity: Yes     Partners: Female     Comment: wife   Social History Narrative    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      Social Determinants of Health     Financial Resource Strain: Low Risk     Difficulty of Paying Living Expenses: Not hard at all   Food Insecurity: No Food Insecurity    Worried About Running Out of Food in the Last Year: Never true    Ran Out of Food in the Last Year: Never true   Transportation Needs: No Transportation Needs    Lack of Transportation (Medical): No    Lack of Transportation (Non-Medical): No   Physical Activity: Inactive    Days of Exercise per Week: 0 days    Minutes of Exercise per Session: 0 min   Stress: Stress Concern Present    Feeling of Stress : Very much   Social Connections: Unknown    Frequency of Communication with Friends and  Family: Once a week    Frequency of Social Gatherings with Friends and Family: Once a week    Active Member of Clubs or Organizations: No    Attends Club or Organization Meetings: Never    Marital Status:    Housing Stability: Low Risk     Unable to Pay for Housing in the Last Year: No    Number of Places Lived in the Last Year: 2    Unstable Housing in the Last Year: No       Allergies:  Patient has no known allergies.    Medications:    Current Outpatient Medications:     albuterol (PROVENTIL/VENTOLIN HFA) 90 mcg/actuation inhaler, 2 puffs every 4 hours as needed for cough, wheeze, or shortness of breath, Disp: 18 g, Rfl: 11    ALPRAZolam (XANAX) 0.25 MG tablet, Take 0.25 mg by mouth nightly as needed., Disp: , Rfl:     azelastine (ASTELIN) 137 mcg (0.1 %) nasal spray, 2 sprays (274 mcg total) by Nasal route 2 (two) times daily., Disp: 30 mL, Rfl: 6    budesonide-formoterol 80-4.5 mcg (SYMBICORT) 80-4.5 mcg/actuation HFAA, Inhale 2 puffs into the lungs 2 (two) times daily. Controller, Disp: 10.2 g, Rfl: 2    diazePAM (VALIUM) 10 MG Tab, Take 2 pills 1 hour before procedure, Disp: 2 tablet, Rfl: 0    fluticasone-umeclidin-vilanter (TRELEGY ELLIPTA) 200-62.5-25 mcg inhaler, Inhale 1 puff into the lungs once daily., Disp: 60 each, Rfl: 11    ipratropium (ATROVENT) 42 mcg (0.06 %) nasal spray, SMARTSIG:Both Nares, Disp: , Rfl:     methylphenidate HCl (CONCERTA) 54 MG CR tablet, Take 1 tablet by mouth once a day, Disp: 30 tablet, Rfl: 0    methylphenidate HCl (RITALIN) 10 MG tablet, Take 10 mg by mouth once daily., Disp: , Rfl:     Review of Systems   Constitutional:  Negative for activity change, appetite change, chills, diaphoresis, fatigue, fever and unexpected weight change.   HENT:  Negative for congestion, dental problem, hearing loss, mouth sores, postnasal drip, rhinorrhea, sinus pressure and trouble swallowing.    Eyes:  Negative for pain, discharge and itching.   Respiratory:  Negative for apnea,  cough, choking, chest tightness, shortness of breath and wheezing.    Cardiovascular:  Negative for chest pain, palpitations and leg swelling.   Gastrointestinal:  Negative for abdominal distention, abdominal pain, anal bleeding, blood in stool, constipation, diarrhea, nausea, rectal pain and vomiting.   Endocrine: Negative for polydipsia and polyuria.   Genitourinary:  Negative for decreased urine volume, difficulty urinating, dysuria, enuresis, flank pain, frequency, genital sores, hematuria, penile discharge, penile pain, penile swelling, scrotal swelling, testicular pain and urgency.   Musculoskeletal:  Negative for arthralgias, back pain and myalgias.   Skin:  Negative for color change, rash and wound.   Neurological:  Negative for dizziness, syncope, speech difficulty, light-headedness and headaches.   Hematological:  Negative for adenopathy. Does not bruise/bleed easily.   Psychiatric/Behavioral:  Negative for behavioral problems, confusion, hallucinations and sleep disturbance.      Objective:      Physical Exam  Constitutional:       Appearance: He is well-developed.   HENT:      Head: Normocephalic.   Cardiovascular:      Rate and Rhythm: Normal rate.   Pulmonary:      Effort: Pulmonary effort is normal.   Abdominal:      Palpations: Abdomen is soft.   Genitourinary:     Comments: Bilateral Vasa are palpable  Skin:     General: Skin is warm.   Neurological:      Mental Status: He is alert.       Assessment:       1. Vasectomy evaluation          Plan:       Juan José was seen today for advice only.    Diagnoses and all orders for this visit:    Vasectomy evaluation  -     Ambulatory referral/consult to Urology  -     Vasectomy; Future    Other orders  -     diazePAM (VALIUM) 10 MG Tab; Take 2 pills 1 hour before procedure       The risks and benefits of vasectomy were discussed with the patient in detail.  The risks include bleeding, infection, pain, scarring, chronic pain, sperm granuloma, recannulization and  loss of testicular function.  The patient was informed that the long term effects of vasectomy are unknown and could be associated with a higher risk of prostate cancer.  He will make sure he is off all blood thinners 1 week prior.  Consent was obtained.  The patient will be scheduled for vasectomy.   A prescription was written for Valium and Doxycyline pre-op and Hydrocodone post-vasectomy.

## 2023-01-14 ENCOUNTER — PATIENT MESSAGE (OUTPATIENT)
Dept: ENDOSCOPY | Facility: HOSPITAL | Age: 43
End: 2023-01-14
Payer: COMMERCIAL

## 2023-01-14 ENCOUNTER — TELEPHONE (OUTPATIENT)
Dept: ENDOSCOPY | Facility: HOSPITAL | Age: 43
End: 2023-01-14
Payer: COMMERCIAL

## 2023-01-14 NOTE — TELEPHONE ENCOUNTER
----- Message from Izzy Fan LPN sent at 1/13/2023  9:01 AM CST -----  Regarding: FW: Appt  Contact: [phone]    ----- Message -----  From: Adali Montoya  Sent: 1/13/2023   8:42 AM CST  To: Jacob GUZMAN Jr Staff  Subject: Appt                                             Pt calling to cancel procedure scheduled on 01/31 @ 1pm. Please call and adv @ [phone]

## 2023-01-27 LAB
ACID FAST MOD KINY STN SPEC: NORMAL
MYCOBACTERIUM SPEC QL CULT: NORMAL

## 2023-09-05 ENCOUNTER — OFFICE VISIT (OUTPATIENT)
Dept: URGENT CARE | Facility: CLINIC | Age: 43
End: 2023-09-05
Payer: COMMERCIAL

## 2023-09-05 VITALS
TEMPERATURE: 99 F | OXYGEN SATURATION: 95 % | HEART RATE: 102 BPM | BODY MASS INDEX: 29.47 KG/M2 | DIASTOLIC BLOOD PRESSURE: 85 MMHG | SYSTOLIC BLOOD PRESSURE: 121 MMHG | WEIGHT: 242 LBS | HEIGHT: 76 IN | RESPIRATION RATE: 18 BRPM

## 2023-09-05 DIAGNOSIS — R11.0 NAUSEA: ICD-10-CM

## 2023-09-05 DIAGNOSIS — T67.1XXA HEAT SYNCOPE, INITIAL ENCOUNTER: Primary | ICD-10-CM

## 2023-09-05 DIAGNOSIS — R42 LIGHT HEADED: ICD-10-CM

## 2023-09-05 LAB
CTP QC/QA: YES
SARS-COV-2 AG RESP QL IA.RAPID: NEGATIVE

## 2023-09-05 PROCEDURE — 99213 PR OFFICE/OUTPT VISIT, EST, LEVL III, 20-29 MIN: ICD-10-PCS | Mod: S$GLB,,, | Performed by: FAMILY MEDICINE

## 2023-09-05 PROCEDURE — 87811 SARS-COV-2 COVID19 W/OPTIC: CPT | Mod: QW,S$GLB,, | Performed by: FAMILY MEDICINE

## 2023-09-05 PROCEDURE — 99213 OFFICE O/P EST LOW 20 MIN: CPT | Mod: S$GLB,,, | Performed by: FAMILY MEDICINE

## 2023-09-05 PROCEDURE — 87811 SARS CORONAVIRUS 2 ANTIGEN POCT, MANUAL READ: ICD-10-PCS | Mod: QW,S$GLB,, | Performed by: FAMILY MEDICINE

## 2023-09-05 NOTE — PROGRESS NOTES
"Subjective:      Patient ID: Juan José Ng is a 42 y.o. male.    Vitals:  height is 6' 4" (1.93 m) and weight is 109.8 kg (242 lb). His oral temperature is 99.1 °F (37.3 °C). His blood pressure is 121/85 and his pulse is 102. His respiration is 18 and oxygen saturation is 95%.     Chief Complaint: Nausea    Patient is coming in for nausea and light-headedness. Patient started having symptoms on last night.  Light-headedness is constant. Patient increased fluid intake.    Nausea  This is a new problem. The current episode started yesterday. Associated symptoms include nausea. Nothing aggravates the symptoms. He has tried nothing for the symptoms.       Gastrointestinal:  Positive for nausea.      Objective:     Physical Exam   Constitutional: He is oriented to person, place, and time. He appears well-developed. He is cooperative.  Non-toxic appearance. He does not appear ill. No distress.   HENT:   Head: Normocephalic and atraumatic.   Ears:   Right Ear: Hearing, tympanic membrane, external ear and ear canal normal.   Left Ear: Hearing, tympanic membrane, external ear and ear canal normal.   Nose: Nose normal. No mucosal edema, rhinorrhea or nasal deformity. No epistaxis. Right sinus exhibits no maxillary sinus tenderness and no frontal sinus tenderness. Left sinus exhibits no maxillary sinus tenderness and no frontal sinus tenderness.   Mouth/Throat: Uvula is midline, oropharynx is clear and moist and mucous membranes are normal. No trismus in the jaw. Normal dentition. No uvula swelling. No posterior oropharyngeal erythema.   Eyes: Conjunctivae and lids are normal. Right eye exhibits no discharge. Left eye exhibits no discharge. No scleral icterus.   Neck: Trachea normal and phonation normal. Neck supple.   Cardiovascular: Normal rate, regular rhythm, normal heart sounds and normal pulses.   Pulmonary/Chest: Effort normal and breath sounds normal. No respiratory distress.   Abdominal: Normal appearance and bowel " sounds are normal. He exhibits no distension and no mass. Soft. There is no abdominal tenderness.   Musculoskeletal: Normal range of motion.         General: No deformity. Normal range of motion.   Neurological: He is alert and oriented to person, place, and time. He exhibits normal muscle tone. Coordination normal.   Skin: Skin is warm, dry, intact, not diaphoretic and not pale.   Psychiatric: His speech is normal and behavior is normal. Judgment and thought content normal.   Nursing note and vitals reviewed.      Assessment:     1. Heat syncope, initial encounter    2. Nausea    3. Light headed        Plan:       Heat syncope, initial encounter  --Pt given instructions and teaching on the mechanism of action that causes this as result of decreased water intake and how important this is to prevent syncope which involves LOC briefly by definition. Pt advised the need to stay well hydrated which includes prevent thirst or getting to the point that you are not sweating as this has a potential to lead to heat stroke due to elevation in Core body temperature.    Nausea  -     SARS Coronavirus 2 Antigen, POCT Manual Read    Light headed  -     SARS Coronavirus 2 Antigen, POCT Manual Read

## 2023-09-20 NOTE — PROGRESS NOTES
Subjective:     HPI: Juan José Ng is a 42 y.o. male with bronchiectasis (on trelegy, Aerobika), s/p ESS in 1998 who was self-referred for  lightheadedness .    Patient reports developing chronic daily lightheadedness about 2 weeks ago which has wax and wane in severity.  Patient denies any known trigger but usually more severe episodes last about 15 minutes.  These episodes can occur while lying down and do not occur more often when going from a seated or lying position to standing.  Patient denies any room spinning, headache, ear pain, tinnitus, chest pain, palpitations.  Patient reports being seen at urgent care and being diagnosed with dehydration due to recent yd work.  Of note, patient recently starting propel sugar free energy supplements for his chronic fatigue.  Patient has been trying to cut back on sodas and eating healthier hence so has been utilizing sugar free options.  Patient previously drank diet cokes but those let them with a headache and lightheaded sensation.  Patient evaluated on 3/24/21 by ENT and had normal audiogram and Negative Kae-Hallpike.      Per Chart review, patient has been tested for immune deficiencies in the past including CF, ciliary dyskinesia, and more which were all negative.  He follows with Pulmonology for bronchiectasis.  Patient reports chronic cough usually able to cough up yellow mucus.  Patient feels like globus sensation which clears.      He recalls previously having allergy testing, which was reportedly all negative. In 2015 negative skin test to inhalant allergens.  He denies a history of asthma.  He denies a history of reflux symptoms.    He relates a diagnosis of SEVERE obstructive sleep apnea without regular CPAP use.  Patient with significant anxiety/panic attacks from CPAP use so no longer uses it.  He does not recall a prior history of nasal trauma.  He has not had sinonasal surgery.    He has not had a tonsillectomy.  He is not a tobacco smoker.  Has  received prevnar and pneumovax in the past for low S. Pneumo titers.     SNOT-22 score: : (P) 56  NOSE score:: (P) 20%  ETDQ-7 score:: (P) 2.9    Past Medical/Past Surgical History  Past Medical History:   Diagnosis Date    Allergy     Anxiety     Depression     Hx of psychiatric care     Major depression, chronic 7/6/2017    Psychiatric problem     Recurrent upper respiratory infection (URI)     Therapy      He has a past surgical history that includes Appendectomy and Sinus surgery.    Family History/Social History  His family history includes Cancer in his cousin; Depression in his brother; Diabetes in his paternal aunt and paternal uncle; Diverticulitis in his mother; Hypertension in his father; Melanoma in his maternal grandmother.  He reports that he has never smoked. He has never used smokeless tobacco. He reports that he does not drink alcohol and does not use drugs.    Allergies/Immunizations  He has No Known Allergies.  Immunization History   Administered Date(s) Administered    COVID-19, MRNA, LN-S, PF (Pfizer) (Purple Cap) 02/22/2021, 03/15/2021    Influenza 01/24/2011, 12/05/2014    Influenza - Quadrivalent 10/19/2015    Influenza - Quadrivalent - MDCK - PF 09/22/2017    Influenza - Quadrivalent - PF *Preferred* (6 months and older) 01/24/2011, 11/07/2019    Influenza Split 01/24/2011    Pneumococcal Conjugate - 13 Valent 12/04/2019    Pneumococcal Polysaccharide - 23 Valent 11/11/2015, 02/08/2021    Tdap 07/06/2017        Medications   albuterol  ALPRAZolam  dexmethylphenidate  diazePAM Tab  ipratropium  methylphenidate HCl  TRELEGY ELLIPTA Dsdv     Review of Systems     Constitutional: Positive for fatigue.      HENT: Positive for postnasal drip and sinus pressure.      Eyes:  Negative for change in eyesight, eye drainage, eye itching and photophobia.     Respiratory:  Positive for cough.      Cardiovascular:  Negative for chest pain, foot swelling, irregular heartbeat and swollen veins.      Gastrointestinal:  Negative for abdominal pain, acid reflux, constipation, diarrhea, heartburn and vomiting.     Genitourinary: Negative for difficulty urinating, sexual problems and frequent urination.     Musc: Positive for back pain.     Skin: Negative for rash.     Allergy: Negative for food allergies and seasonal allergies.     Endocrine: Negative for cold intolerance and heat intolerance.      Neurological: Positive for dizziness and light-headedness.     Hematologic: Positive for swollen glands.     Psychiatric: Positive for decreased concentration, depression and nervous/anxious.           Objective:     /86 (BP Location: Right arm, Patient Position: Sitting)   Pulse 99   Wt 111.7 kg (246 lb 4.1 oz)   BMI 29.97 kg/m²        Constitutional:   Vital signs are normal. He appears well-developed and well-nourished. Normal speech.      Head:  Normocephalic and atraumatic. Head is without TMJ tenderness (R TMJ crepitus). Facial strength is normal.    EOM intact     Ears:    Right Ear: No drainage or swelling. Tympanic membrane is not perforated and not erythematous. No middle ear effusion.   Left Ear: No drainage or swelling. Tympanic membrane is not perforated and not erythematous.  No middle ear effusion.     Nose:  No mucosal edema, rhinorrhea, septal deviation or polyps.  No foreign bodies. Turbinates normal, no turbinate masses and no turbinate hypertrophy.  Right sinus exhibits no maxillary sinus tenderness and no frontal sinus tenderness. Left sinus exhibits no maxillary sinus tenderness and no frontal sinus tenderness.   Scant R nasal cavity stringy mucous    Mouth/Throat  Oropharynx clear and moist without lesions or asymmetry, normal uvula midline and lips, teeth, and gums normal. No trismus or mucous membrane lesions. No oropharyngeal exudate, posterior oropharyngeal edema or posterior oropharyngeal erythema. Tonsils present.      Neck:  Phonation normal. Thyroid tenderness is present. No  thyroid mass and no thyromegaly present.     He has no cervical adenopathy.     Pulmonary/Chest:   Effort normal.     Psychiatric:   He has a normal mood and affect. His speech is normal and behavior is normal.       Procedure    None    Data Reviewed  I personally reviewed the chart, including any outside records, and pertinent data below:    I reviewed the following notes: Primary care, Urgent Care, Pulmonology, ENT    WBC (K/uL)   Date Value   03/21/2022 4.71     Eosinophil % (%)   Date Value   03/21/2022 1.9     Eos # (K/uL)   Date Value   03/21/2022 0.1     Platelets (K/uL)   Date Value   03/21/2022 174     Glucose (mg/dL)   Date Value   03/21/2022 86     IgE (IU/mL)   Date Value   03/12/2021 <35       I independently reviewed the images of the CT ST neck dated 4/14/22. Pertinent findings include chronic L sphenoid sinusitis, bilateral maxillary partial opacifications, mucosal thickening of ethmoids, post surgical changes including partial ethmoidectomy and BL uncinectomies .It also shows L tonsiliths with tonsillar cyst.    Assessment & Plan:     1. Lightheadedness  2. Chronic fatigue   - unclear etiology, but symptoms seemed to start after regular intake of Sucralose.  Advised patient complete cessation of Sucralose.    - does not appear to have a inner ear etiology for symptoms   - checking CBC, CMP, mag, phos to assess for dehydration/e- derangements   - BP WNL; does not appear to be orthostatic based on symptoms   - advised to follow up with PCP as scheduled    He will follow up as needed  I had a discussion with the patient regarding his condition and the further workup and management options.    All questions were answered, and the patient is in agreement with the above.     I spent a total of 39 minutes on the day of the visit.  This includes face to face time and non-face to face time preparing to see the patient (eg, review of tests), obtaining and/or reviewing separately obtained history, documenting  clinical information in the electronic or other health record, independently interpreting results and communicating results to the patient/family/caregiver, or care coordinator.    Disclaimer:  This note may have been prepared utilizing voice recognition software which may result in occasional typographical errors in the text such as sound alike words.   If further clarification is needed, please contact the ENT department of [facility].

## 2023-09-21 ENCOUNTER — OFFICE VISIT (OUTPATIENT)
Dept: OTOLARYNGOLOGY | Facility: CLINIC | Age: 43
End: 2023-09-21
Payer: COMMERCIAL

## 2023-09-21 VITALS
BODY MASS INDEX: 29.97 KG/M2 | WEIGHT: 246.25 LBS | DIASTOLIC BLOOD PRESSURE: 86 MMHG | SYSTOLIC BLOOD PRESSURE: 133 MMHG | HEART RATE: 99 BPM

## 2023-09-21 DIAGNOSIS — R42 LIGHTHEADEDNESS: Primary | ICD-10-CM

## 2023-09-21 DIAGNOSIS — R53.82 CHRONIC FATIGUE: ICD-10-CM

## 2023-09-21 PROCEDURE — 3075F PR MOST RECENT SYSTOLIC BLOOD PRESS GE 130-139MM HG: ICD-10-PCS | Mod: CPTII,S$GLB,, | Performed by: PHYSICIAN ASSISTANT

## 2023-09-21 PROCEDURE — 3079F DIAST BP 80-89 MM HG: CPT | Mod: CPTII,S$GLB,, | Performed by: PHYSICIAN ASSISTANT

## 2023-09-21 PROCEDURE — 99999 PR PBB SHADOW E&M-EST. PATIENT-LVL III: ICD-10-PCS | Mod: PBBFAC,,, | Performed by: PHYSICIAN ASSISTANT

## 2023-09-21 PROCEDURE — 3079F PR MOST RECENT DIASTOLIC BLOOD PRESSURE 80-89 MM HG: ICD-10-PCS | Mod: CPTII,S$GLB,, | Performed by: PHYSICIAN ASSISTANT

## 2023-09-21 PROCEDURE — 1159F MED LIST DOCD IN RCRD: CPT | Mod: CPTII,S$GLB,, | Performed by: PHYSICIAN ASSISTANT

## 2023-09-21 PROCEDURE — 3075F SYST BP GE 130 - 139MM HG: CPT | Mod: CPTII,S$GLB,, | Performed by: PHYSICIAN ASSISTANT

## 2023-09-21 PROCEDURE — 99213 PR OFFICE/OUTPT VISIT, EST, LEVL III, 20-29 MIN: ICD-10-PCS | Mod: S$GLB,,, | Performed by: PHYSICIAN ASSISTANT

## 2023-09-21 PROCEDURE — 3008F PR BODY MASS INDEX (BMI) DOCUMENTED: ICD-10-PCS | Mod: CPTII,S$GLB,, | Performed by: PHYSICIAN ASSISTANT

## 2023-09-21 PROCEDURE — 1159F PR MEDICATION LIST DOCUMENTED IN MEDICAL RECORD: ICD-10-PCS | Mod: CPTII,S$GLB,, | Performed by: PHYSICIAN ASSISTANT

## 2023-09-21 PROCEDURE — 99213 OFFICE O/P EST LOW 20 MIN: CPT | Mod: S$GLB,,, | Performed by: PHYSICIAN ASSISTANT

## 2023-09-21 PROCEDURE — 99999 PR PBB SHADOW E&M-EST. PATIENT-LVL III: CPT | Mod: PBBFAC,,, | Performed by: PHYSICIAN ASSISTANT

## 2023-09-21 PROCEDURE — 3008F BODY MASS INDEX DOCD: CPT | Mod: CPTII,S$GLB,, | Performed by: PHYSICIAN ASSISTANT

## 2023-09-22 ENCOUNTER — LAB VISIT (OUTPATIENT)
Dept: LAB | Facility: HOSPITAL | Age: 43
End: 2023-09-22
Attending: PHYSICIAN ASSISTANT
Payer: COMMERCIAL

## 2023-09-22 DIAGNOSIS — R53.82 CHRONIC FATIGUE: ICD-10-CM

## 2023-09-22 DIAGNOSIS — R42 LIGHTHEADEDNESS: ICD-10-CM

## 2023-09-22 LAB
MAGNESIUM SERPL-MCNC: 2.1 MG/DL (ref 1.6–2.6)
PHOSPHATE SERPL-MCNC: 3 MG/DL (ref 2.7–4.5)

## 2023-09-22 PROCEDURE — 83735 ASSAY OF MAGNESIUM: CPT | Performed by: PHYSICIAN ASSISTANT

## 2023-09-22 PROCEDURE — 84100 ASSAY OF PHOSPHORUS: CPT | Performed by: PHYSICIAN ASSISTANT

## 2023-09-22 PROCEDURE — 36415 COLL VENOUS BLD VENIPUNCTURE: CPT | Mod: PO | Performed by: PHYSICIAN ASSISTANT

## 2023-09-26 ENCOUNTER — HOSPITAL ENCOUNTER (EMERGENCY)
Facility: HOSPITAL | Age: 43
Discharge: HOME | End: 2023-09-27
Attending: EMERGENCY MEDICINE
Payer: COMMERCIAL

## 2023-09-26 DIAGNOSIS — R07.9 CHEST PAIN: Primary | ICD-10-CM

## 2023-09-26 DIAGNOSIS — R59.0 LYMPHADENOPATHY, MESENTERIC: ICD-10-CM

## 2023-09-26 LAB
ALBUMIN SERPL BCP-MCNC: 4.2 G/DL (ref 3.5–5.2)
ALP SERPL-CCNC: 78 U/L (ref 55–135)
ALT SERPL W/O P-5'-P-CCNC: 45 U/L (ref 10–44)
ANION GAP SERPL CALC-SCNC: 8 MMOL/L (ref 8–16)
AST SERPL-CCNC: 31 U/L (ref 10–40)
BASOPHILS # BLD AUTO: 0.02 K/UL (ref 0–0.2)
BASOPHILS NFR BLD: 0.4 % (ref 0–1.9)
BILIRUB SERPL-MCNC: 1.1 MG/DL (ref 0.1–1)
BNP SERPL-MCNC: <10 PG/ML (ref 0–99)
BUN SERPL-MCNC: 12 MG/DL (ref 6–20)
CALCIUM SERPL-MCNC: 9.3 MG/DL (ref 8.7–10.5)
CHLORIDE SERPL-SCNC: 106 MMOL/L (ref 95–110)
CO2 SERPL-SCNC: 25 MMOL/L (ref 23–29)
CREAT SERPL-MCNC: 1.1 MG/DL (ref 0.5–1.4)
DIFFERENTIAL METHOD: ABNORMAL
EOSINOPHIL # BLD AUTO: 0.1 K/UL (ref 0–0.5)
EOSINOPHIL NFR BLD: 1.4 % (ref 0–8)
ERYTHROCYTE [DISTWIDTH] IN BLOOD BY AUTOMATED COUNT: 13.4 % (ref 11.5–14.5)
EST. GFR  (NO RACE VARIABLE): >60 ML/MIN/1.73 M^2
GLUCOSE SERPL-MCNC: 87 MG/DL (ref 70–110)
HCT VFR BLD AUTO: 39.9 % (ref 40–54)
HCV AB SERPL QL IA: NORMAL
HGB BLD-MCNC: 14.1 G/DL (ref 14–18)
HIV 1+2 AB+HIV1 P24 AG SERPL QL IA: NORMAL
IMM GRANULOCYTES # BLD AUTO: 0 K/UL (ref 0–0.04)
IMM GRANULOCYTES NFR BLD AUTO: 0 % (ref 0–0.5)
LYMPHOCYTES # BLD AUTO: 1 K/UL (ref 1–4.8)
LYMPHOCYTES NFR BLD: 19.6 % (ref 18–48)
MCH RBC QN AUTO: 29.3 PG (ref 27–31)
MCHC RBC AUTO-ENTMCNC: 35.3 G/DL (ref 32–36)
MCV RBC AUTO: 83 FL (ref 82–98)
MONOCYTES # BLD AUTO: 0.5 K/UL (ref 0.3–1)
MONOCYTES NFR BLD: 9 % (ref 4–15)
NEUTROPHILS # BLD AUTO: 3.5 K/UL (ref 1.8–7.7)
NEUTROPHILS NFR BLD: 69.6 % (ref 38–73)
NRBC BLD-RTO: 0 /100 WBC
PLATELET # BLD AUTO: 144 K/UL (ref 150–450)
PMV BLD AUTO: 10.7 FL (ref 9.2–12.9)
POTASSIUM SERPL-SCNC: 4.3 MMOL/L (ref 3.5–5.1)
PROT SERPL-MCNC: 7.4 G/DL (ref 6–8.4)
RBC # BLD AUTO: 4.82 M/UL (ref 4.6–6.2)
SODIUM SERPL-SCNC: 139 MMOL/L (ref 136–145)
TROPONIN I SERPL DL<=0.01 NG/ML-MCNC: 0.01 NG/ML (ref 0–0.03)
TROPONIN I SERPL DL<=0.01 NG/ML-MCNC: 0.02 NG/ML (ref 0–0.03)
WBC # BLD AUTO: 5 K/UL (ref 3.9–12.7)

## 2023-09-26 PROCEDURE — 84484 ASSAY OF TROPONIN QUANT: CPT

## 2023-09-26 PROCEDURE — 87389 HIV-1 AG W/HIV-1&-2 AB AG IA: CPT | Performed by: PHYSICIAN ASSISTANT

## 2023-09-26 PROCEDURE — 93010 EKG 12-LEAD: ICD-10-PCS | Mod: ,,, | Performed by: INTERNAL MEDICINE

## 2023-09-26 PROCEDURE — 99285 EMERGENCY DEPT VISIT HI MDM: CPT | Mod: 25

## 2023-09-26 PROCEDURE — 85025 COMPLETE CBC W/AUTO DIFF WBC: CPT

## 2023-09-26 PROCEDURE — 25500020 PHARM REV CODE 255: Performed by: EMERGENCY MEDICINE

## 2023-09-26 PROCEDURE — 80053 COMPREHEN METABOLIC PANEL: CPT

## 2023-09-26 PROCEDURE — 93010 ELECTROCARDIOGRAM REPORT: CPT | Mod: ,,, | Performed by: INTERNAL MEDICINE

## 2023-09-26 PROCEDURE — 83880 ASSAY OF NATRIURETIC PEPTIDE: CPT

## 2023-09-26 PROCEDURE — 93005 ELECTROCARDIOGRAM TRACING: CPT

## 2023-09-26 PROCEDURE — 86803 HEPATITIS C AB TEST: CPT | Performed by: PHYSICIAN ASSISTANT

## 2023-09-26 RX ADMIN — IOHEXOL 100 ML: 350 INJECTION, SOLUTION INTRAVENOUS at 08:09

## 2023-09-26 NOTE — ED PROVIDER NOTES
Encounter Date: 9/26/2023       History     Chief Complaint   Patient presents with    Chest Pain     Left hand numbness denies cardiac history     Mr. Ng is a 41 yo M with a PMHx of panic attacks, anxiety, depression and ADHD who presents today with left sided chest pain. He describes it as a heavy pressure over his L chest that started about an hour ago while he was at his desk at work. It does radiate to his back. Pt also endorses L 5th finger numbness as well as left foot numbness starting about 30 minutes ago.  He also has had worsening lightheadedness for the past 3 weeks which started after doing yard work. He has seen his PCP for the lightheadedness and was told it was due to dehydration. His last panic attack was about a year ago and describes his typical panic attacks as having palpitations but no chest pain. He does say he has been under a lot of stress lately. He has never seen a cardiologist or had any sort of cardiac stress testing. Family history is positive for a grandfather who had a coronary bypass surgery at 70 years old. Father has no heart disease history but has a history of HTN, HLD and DM.     The history is provided by the patient.     Review of patient's allergies indicates:  No Known Allergies  Past Medical History:   Diagnosis Date    Allergy     Anxiety     Depression     Hx of psychiatric care     Major depression, chronic 7/6/2017    Psychiatric problem     Recurrent upper respiratory infection (URI)     Therapy      Past Surgical History:   Procedure Laterality Date    APPENDECTOMY      SINUS SURGERY       Family History   Problem Relation Age of Onset    Diverticulitis Mother     Hypertension Father     Diabetes Paternal Aunt     Diabetes Paternal Uncle     Cancer Cousin         some type of intestinal cancer    Depression Brother     Melanoma Maternal Grandmother      Social History     Tobacco Use    Smoking status: Never    Smokeless tobacco: Never   Substance Use Topics     Alcohol use: No    Drug use: No     Review of Systems    Physical Exam     Initial Vitals [09/26/23 1734]   BP Pulse Resp Temp SpO2   (!) 141/105 110 16 98.1 °F (36.7 °C) 100 %      MAP       --         Physical Exam    Constitutional: He appears well-developed and well-nourished.   HENT:   Head: Normocephalic and atraumatic.   Eyes: Conjunctivae are normal.   Neck: Neck supple. No tracheal deviation present.   Normal range of motion.  Cardiovascular:  Regular rhythm.   Tachycardia present.         Pulmonary/Chest: Breath sounds normal. No respiratory distress. He exhibits tenderness.   Abdominal: Abdomen is soft. Bowel sounds are normal. He exhibits no distension. There is no abdominal tenderness.   Musculoskeletal:         General: No edema. Normal range of motion.      Cervical back: Normal range of motion and neck supple.     Neurological: He is alert and oriented to person, place, and time. He has normal strength. A sensory deficit (L 5th finger) is present.   Skin: Skin is warm and dry. Capillary refill takes less than 2 seconds.         ED Course   Procedures  Labs Reviewed   CBC W/ AUTO DIFFERENTIAL - Abnormal; Notable for the following components:       Result Value    Hematocrit 39.9 (*)     Platelets 144 (*)     All other components within normal limits   COMPREHENSIVE METABOLIC PANEL - Abnormal; Notable for the following components:    Total Bilirubin 1.1 (*)     ALT 45 (*)     All other components within normal limits   HIV 1 / 2 ANTIBODY    Narrative:     Release to patient->Immediate   HEPATITIS C ANTIBODY    Narrative:     Release to patient->Immediate   TROPONIN I   B-TYPE NATRIURETIC PEPTIDE   TROPONIN I          Imaging Results              CTA Chest Abdomen Pelvis (In process)                      X-Ray Chest AP Portable (Final result)  Result time 09/26/23 19:33:10      Final result by Salvatore Wilkes MD (09/26/23 19:33:10)                   Impression:      1. No acute cardiopulmonary  process, left basilar subsegmental atelectasis.  This may reflect residual from prior process.      Electronically signed by: Salvatore Wilkes MD  Date:    09/26/2023  Time:    19:33               Narrative:    EXAMINATION:  XR CHEST AP PORTABLE    CLINICAL HISTORY:  chest pain;    TECHNIQUE:  Single frontal view of the chest was performed.    COMPARISON:  None    FINDINGS:  The cardiomediastinal silhouette is not enlarged.  There is no pleural effusion.  The trachea is midline.  The lungs are symmetrically expanded bilaterally with mild bilateral basilar subsegmental atelectasis..  No large focal consolidation seen.  There is no pneumothorax.  The osseous structures are remarkable for degenerative change..                                       Medications   iohexoL (OMNIPAQUE 350) injection 100 mL (100 mLs Intravenous Given 9/26/23 2056)     Medical Decision Making  Mr. Ng is a 41 yo M with a PMHx of panic attacks, anxiety, depression and ADHD who presented with L sided chest pain which began while he was at work. First time having this type of pain, different than his panic attacks. EKG ordered to rule out MI showed sinus tach and an old septal infarct. CBC, CMP, Troponin and BNP to evaluate cardiac function. CTA chest abd pelvis to rule out aortic dissection and PE in the setting of chest pain radiating to his back and some left sided numbness. With patients history of panic attacks and anxiety and his increase in stress level recently, this is likely an exacerbation of his anxiety or panic attack.    Amount and/or Complexity of Data Reviewed  Labs: ordered.     Details: CBC: Unremarkable  CMP: Unremarkable  Troponin #1: 0.018  Troponin #2:   HIV: Non reactive  Hep C: Non reactive  BNP: < 10  Radiology: ordered and independent interpretation performed.     Details: CXR: Normal Cardiac silhouette, no effusion or pulmonary edema  CTA chest abd pelvis  ECG/medicine tests: ordered.     Details: EKG: sinus  tachycardia, no sign of acute ischemic changes                               Clinical Impression:   Final diagnoses:  [R07.9] Chest pain               Kam Amaya MD  Resident  09/26/23 2100

## 2023-09-27 VITALS
WEIGHT: 245 LBS | RESPIRATION RATE: 17 BRPM | DIASTOLIC BLOOD PRESSURE: 83 MMHG | OXYGEN SATURATION: 95 % | HEART RATE: 74 BPM | SYSTOLIC BLOOD PRESSURE: 132 MMHG | HEIGHT: 76 IN | TEMPERATURE: 98 F | BODY MASS INDEX: 29.83 KG/M2

## 2023-09-27 NOTE — ED TRIAGE NOTES
Patient to ED BIB friend with complaints of L sided chest pressure radiating to L back x1 hr pta while sitting at work desk. Patient also endorses associated L hand 5th digit/L foot numbness. Denies shortness of breath, NVD, abd pain,  complaints. Patient also endorses intermittent dizziness/lightheadedness x 3 weeks. Denies recent syncopal episodes. Strength equal bilaterally. Patient aaox4. Respirations even and unlabored. Skin warm and dry. Wife at bedside. Plan of care discussed with patient - verbalized understanding. Pending ED workup at this time.

## 2023-09-27 NOTE — ED NOTES
Repeat troponin to be collected at 2020. Specimen scanned and marked collected in chart by mistake. Specimen not sent - will recollect blood at scheduled time and send for results.

## 2023-09-27 NOTE — DISCHARGE INSTRUCTIONS
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 Other underlying pathology, such as neoplasm or lymphoproliferative disorder, is not excluded. Correlate clinically, and with follow-up abdominal CT or abdominal MRI in 3-6 months.  No evidence of aneurysm, dissection, or other acute abnormality of thoracic aorta, abdominal aorta, or iliac arteries.  No evidence of large central pulmonary thromboemboli.  Possible bronchitis or aspiration.  Other bronchial wall or endobronchial lesions not excluded.  Correlate clinically, and with follow-up chest CT in 3-6 months.     Keep the appointment you have with neurology as well as your primary doctor.    Seek immediate medical attention if you have new or worsening symptoms, or for any other concern.

## 2023-09-27 NOTE — PROVIDER PROGRESS NOTES - EMERGENCY DEPT.
Encounter Date: 9/26/2023    ED Physician Progress Notes        ED Physician Hand-off Note:    ED Course: I assumed care of patient from off-going ED physician team. Briefly, Patient is a 43 yo M with PMH of allergies, anxiety, bronchitis, depression who presents with concern for not feeling well, left sided chest pain, and intermittent left hand and leg tingling and numbness.     At the time of signout plan was pending CTA chest abdomen and pelvis.    Medications given in the ED:    Medications   iohexoL (OMNIPAQUE 350) injection 100 mL (100 mLs Intravenous Given 9/26/23 2056)     Imaging Results              CTA Chest Abdomen Pelvis (Final result)  Result time 09/26/23 23:51:36      Final result by Juan Casillas MD (09/26/23 23:51:36)                   Impression:      CTA CHEST, ABDOMEN, AND PELVIS:    Mariam mesenteric stranding with associated prominent to enlarged lymph nodes (up to 17 x 12 mm), most likely related to mesenteric panniculitis. Other underlying pathology, such as neoplasm or lymphoproliferative disorder, is not excluded. Correlate clinically, and with follow-up abdominal CT or abdominal MRI in 3-6 months.    No evidence of aneurysm, dissection, or other acute abnormality of thoracic aorta, abdominal aorta, or iliac arteries.    No evidence of large central pulmonary thromboemboli.    Possible bronchitis or aspiration.  Other bronchial wall or endobronchial lesions not excluded.  Correlate clinically, and with follow-up chest CT in 3-6 months.    Intrathoracic granulomatous calcifications.    Status post appendectomy.      Electronically signed by: Juan Casillas  Date:    09/26/2023  Time:    23:51               Narrative:    EXAMINATION:  CTA CHEST ABDOMEN PELVIS    CLINICAL HISTORY:  Aortic aneurysm, known or suspected;concern for dissection;    TECHNIQUE:  Prior to administration of IV contrast, images of the thoracic aorta, abdominal aorta, and iliac arteries were obtained from the  thoracic inlet through the lesser femoral trochanters.    Following the intravenous administration 100 mL Omnipaque 350, arterial phase angiographic CT images of the thoracic aorta, abdominal aorta, and iliac arteries was performed from the thoracic inlet through the lesser femoral trochanters.     images, axial images, and sagittal and coronal two-dimensional multiplanar reformations are submitted and reviewed.    COMPARISON:  Noncontrast chest CT 04/07/2022    FINDINGS:  Allowing for some beam hardening and/or motion artifacts, pre and postcontrast CTA images demonstrates no evidence of aneurysmal enlargement, hyperattenuating intramural hematoma, dissection, or other acute abnormality of the thoracic aorta, abdominal aorta, or iliac arteries.    No mediastinal hematoma, hemopericardium, hemothorax, retroperitoneal hematoma, or hemoperitoneum.    Visualized segments of the brachiocephalic arteries appear patent.    Celiac trunk, SMA, WHITNEY, and right and left main renal arteries appear patent.    Right and left accessory renal arteries are not optimally visualized but appear grossly patent as well.    Pulmonary arteries: No evidence of large central pulmonary thromboemboli.    Chest CT:    Thyroid: Incompletely visualized subcentimeter bilateral hypoattenuating thyroid nodules.  Subcentimeter left thyroid calcification.    Airways: Trachea and mainstem bronchi are grossly patent.  Minimal somewhat nodular mural thickening along the posterolateral aspect of the luminal surface of the thoracic trachea (series 603, images 12-13) likely represents tumefactive mural adherent mucus; other pathology not fully excluded.  Some irregular frothy opacities and/or wall thickening are demonstrated in some lower lobe segmental or subsegmental bronchi bilaterally.    Mediastinum/jo-ann: No mediastinal or hilar mass or lymph node enlargement (by 1 cm short axis diameter size criteria).  No pneumomediastinum.  Subcentimeter  calcifications inferior to the left mainstem bronchus are likely within nonenlarged lymph nodes and are likely granulomatous.    Heart/pericardium: No acute CT abnormality, but assessment is limited by motion and beam hardening artifacts, especially on the postcontrast images.  These artifacts also limit visualization/assessment of the coronary arteries.    Lungs: Enhancing subpleural opacities in the anteromedial aspect of the right middle lobe and in anterior inferolateral aspect of the left lingula most likely represent subsegmental or rounded atelectasis.    Calcified granulomas in the left lower lobe.    No consolidation, pulmonary edema, or discrete pulmonary mass.    Pleura: No pneumothorax, pleural fluid, or discrete pleural mass.    Abdomen CT:    There is belinda stranding within portions of the central mesentery.  This encompasses some prominent to mildly enlarged mesenteric lymph nodes.    The liver, gallbladder, bile ducts, pancreas, prominent spleen, accessory spleens, right and left adrenal glands, right and left kidneys, poorly distended stomach, duodenum, small bowel, large bowel, mesentery, omentum, and peritoneal cavity demonstrate no acute CT abnormalities.    The appendix is surgically absent.    Pelvic CT:    Prostate gland: Normal transverse diameter.  Coarse parenchyma calcifications.    Seminal vesicles: Mild asymmetric prominence of the right seminal vesicle, relative to the left, etiology uncertain.    Urinary bladder: Poorly distended.    Body wall: No acute CT abnormalities.    Musculoskeletal: No acute CT abnormality in the visualized thoracic skeleton, lumbar spine, osseous pelvic ring, or hips.                                       X-Ray Chest AP Portable (Final result)  Result time 09/26/23 19:33:10      Final result by Salvatore Wilkes MD (09/26/23 19:33:10)                   Impression:      1. No acute cardiopulmonary process, left basilar subsegmental atelectasis.  This may  reflect residual from prior process.      Electronically signed by: Salvatore Wilkes MD  Date:    09/26/2023  Time:    19:33               Narrative:    EXAMINATION:  XR CHEST AP PORTABLE    CLINICAL HISTORY:  chest pain;    TECHNIQUE:  Single frontal view of the chest was performed.    COMPARISON:  None    FINDINGS:  The cardiomediastinal silhouette is not enlarged.  There is no pleural effusion.  The trachea is midline.  The lungs are symmetrically expanded bilaterally with mild bilateral basilar subsegmental atelectasis..  No large focal consolidation seen.  There is no pneumothorax.  The osseous structures are remarkable for degenerative change..                                    1:05 AM  Updated patient on results  He has no symptoms of pneumonia or aspiration  We discussed CT findings and he feels the lung findings are chronic  He has a follow up appt with pcp and neurology   He does not currently have numbness to his left hand, arm, leg  He also has no abdominal pain     Disposition: home    Patient comfortable with plan for discharge. Patient counseled regarding exam, results, diagnosis, treatment, and plan.    Impression: Final diagnoses:  [R07.9] Chest pain (Primary)  [R59.0] Lymphadenopathy, mesenteric

## 2023-09-28 ENCOUNTER — OFFICE VISIT (OUTPATIENT)
Dept: PULMONOLOGY | Facility: CLINIC | Age: 43
End: 2023-09-28
Payer: COMMERCIAL

## 2023-09-28 ENCOUNTER — LAB VISIT (OUTPATIENT)
Dept: LAB | Facility: HOSPITAL | Age: 43
End: 2023-09-28
Attending: INTERNAL MEDICINE
Payer: COMMERCIAL

## 2023-09-28 VITALS
BODY MASS INDEX: 29.76 KG/M2 | HEART RATE: 114 BPM | DIASTOLIC BLOOD PRESSURE: 88 MMHG | SYSTOLIC BLOOD PRESSURE: 124 MMHG | OXYGEN SATURATION: 98 % | HEIGHT: 76 IN | WEIGHT: 244.38 LBS

## 2023-09-28 DIAGNOSIS — F45.8 HYPERVENTILATION SYNDROME: ICD-10-CM

## 2023-09-28 DIAGNOSIS — J47.1 BRONCHIECTASIS WITH (ACUTE) EXACERBATION: ICD-10-CM

## 2023-09-28 DIAGNOSIS — D84.9 IMMUNODEFICIENCY: ICD-10-CM

## 2023-09-28 DIAGNOSIS — J47.1 BRONCHIECTASIS WITH (ACUTE) EXACERBATION: Primary | ICD-10-CM

## 2023-09-28 PROBLEM — A31.0 PULMONARY MYCOBACTERIUM AVIUM COMPLEX (MAC) INFECTION: Status: RESOLVED | Noted: 2019-08-19 | Resolved: 2023-09-28

## 2023-09-28 PROBLEM — A31.0 MAI (MYCOBACTERIUM AVIUM-INTRACELLULARE): Status: RESOLVED | Noted: 2022-06-16 | Resolved: 2023-09-28

## 2023-09-28 PROCEDURE — 1159F PR MEDICATION LIST DOCUMENTED IN MEDICAL RECORD: ICD-10-PCS | Mod: CPTII,S$GLB,, | Performed by: INTERNAL MEDICINE

## 2023-09-28 PROCEDURE — 1159F MED LIST DOCD IN RCRD: CPT | Mod: CPTII,S$GLB,, | Performed by: INTERNAL MEDICINE

## 2023-09-28 PROCEDURE — 99215 PR OFFICE/OUTPT VISIT, EST, LEVL V, 40-54 MIN: ICD-10-PCS | Mod: S$GLB,,, | Performed by: INTERNAL MEDICINE

## 2023-09-28 PROCEDURE — 99999 PR PBB SHADOW E&M-EST. PATIENT-LVL III: ICD-10-PCS | Mod: PBBFAC,,, | Performed by: INTERNAL MEDICINE

## 2023-09-28 PROCEDURE — 3074F PR MOST RECENT SYSTOLIC BLOOD PRESSURE < 130 MM HG: ICD-10-PCS | Mod: CPTII,S$GLB,, | Performed by: INTERNAL MEDICINE

## 2023-09-28 PROCEDURE — 87205 SMEAR GRAM STAIN: CPT | Performed by: INTERNAL MEDICINE

## 2023-09-28 PROCEDURE — 99215 OFFICE O/P EST HI 40 MIN: CPT | Mod: S$GLB,,, | Performed by: INTERNAL MEDICINE

## 2023-09-28 PROCEDURE — 99999 PR PBB SHADOW E&M-EST. PATIENT-LVL III: CPT | Mod: PBBFAC,,, | Performed by: INTERNAL MEDICINE

## 2023-09-28 PROCEDURE — 3008F PR BODY MASS INDEX (BMI) DOCUMENTED: ICD-10-PCS | Mod: CPTII,S$GLB,, | Performed by: INTERNAL MEDICINE

## 2023-09-28 PROCEDURE — 3079F DIAST BP 80-89 MM HG: CPT | Mod: CPTII,S$GLB,, | Performed by: INTERNAL MEDICINE

## 2023-09-28 PROCEDURE — 3008F BODY MASS INDEX DOCD: CPT | Mod: CPTII,S$GLB,, | Performed by: INTERNAL MEDICINE

## 2023-09-28 PROCEDURE — 3074F SYST BP LT 130 MM HG: CPT | Mod: CPTII,S$GLB,, | Performed by: INTERNAL MEDICINE

## 2023-09-28 PROCEDURE — 3079F PR MOST RECENT DIASTOLIC BLOOD PRESSURE 80-89 MM HG: ICD-10-PCS | Mod: CPTII,S$GLB,, | Performed by: INTERNAL MEDICINE

## 2023-09-28 RX ORDER — FLUTICASONE FUROATE, UMECLIDINIUM BROMIDE AND VILANTEROL TRIFENATATE 200; 62.5; 25 UG/1; UG/1; UG/1
1 POWDER RESPIRATORY (INHALATION) DAILY
Qty: 60 EACH | Refills: 11 | Status: SHIPPED | OUTPATIENT
Start: 2023-09-28

## 2023-09-28 RX ORDER — ALBUTEROL SULFATE 90 UG/1
AEROSOL, METERED RESPIRATORY (INHALATION)
Qty: 18 G | Refills: 11 | Status: SHIPPED | OUTPATIENT
Start: 2023-09-28 | End: 2023-12-11 | Stop reason: SDUPTHER

## 2023-09-28 RX ORDER — FLUTICASONE FUROATE, UMECLIDINIUM BROMIDE AND VILANTEROL TRIFENATATE 200; 62.5; 25 UG/1; UG/1; UG/1
1 POWDER RESPIRATORY (INHALATION) DAILY
Qty: 60 EACH | Refills: 11 | Status: SHIPPED | OUTPATIENT
Start: 2023-09-28 | End: 2023-09-28 | Stop reason: SDUPTHER

## 2023-09-28 NOTE — PATIENT INSTRUCTIONS
Ct chest viewed from 9/26/2023 with bronchiectasis and mucous plugging...worse than seen 2022.    You likely have bronchiectasis from immune deficiency that was improved by pneumonia vaccine-- immune system may weaken -- should immune test recheck in future  and order placed..        Vest treatment with aerobika is dosed in office.  Mucous is mobilized.      Hyperventilation may cause light headed sensation -- could try re breathing from paper bag.    Anxiety may contribute-- would dose xanax if light headed sensation significant....      Would increase breathing medications as mucous plugs seen-- may contribute to symptoms although not directly.    Use trelegy once daily  Prednisone daily for 3-6 days-- may hype up???  Repeat if needed....    Use albuterol 2-3 puffs  before aerobika at least once or twice daily.    With increase mucous plugging -- do culture now.  May need antibiotic.  Culture sputum if yellow..    Ct was worse with mucous plugging-- need to try to stay on trelegy and hygiene with aerobika/albuterol    You improved sensation with vest and trelegy and paper bag today.    Heart had no significant calcium build up seen.        .

## 2023-09-28 NOTE — PROGRESS NOTES
"[facility] - NEUROLOGY [address] [facility], [place]    Date: 9/29/23  Patient Name: Juan José Ng   MRN: [id number]   PCP: Lina Silva  Referring Provider: Self, Aaareferral    Chief Complaint: Lightheadedness  Subjective:          HPI:   Mr. Juan José Ng is a 43 y.o. male presenting for evaluation of lightheadedness. On chart review the patient was seen by Urgent Care and ENT on 9/5/23 and 9/21/23 respectively.     Per the patient his symptoms began about 4 weeks ago which he describes as "fuzzy brain-ness" where he feels as he is about to pass out but he has not had any LOC. He describes his symptoms as constant but with some waxing and waning in severity. He notes that he has some paresthesias described as "buzzing" in the front of his head which occur a few seconds and then resolve. These are the episodes as he describes as worsening of his symptoms. He denies any known triggers or aggravating factors. There is not a specific time of day when his symptoms are more bothersome than others. He denies any changes in vision or tunnel vision, denies any LOC, or falls. The thing he has found most beneficial is  breathing into a paper bag as per his pulmonologist. He denies any problems swallowing or difficulty with sleep. He was diagnosed with sleep apnea but is not compliant with CPAP. He notes that he does sleep on his side and denies any symptoms regularly noted by his wife. He notes that he does not drink or smoke. He notes that over the past 4 weeks he has found that his symptoms have stayed relatively stable and don't seem to have declined.     Initially he believed his symptoms were 2/2 to dehydration. He notes that this past week he has been having noticeable chest pain and numbness of the L hand and L foot. He notes that it does seem to be aggrevated following sleeping on the L side.     He notes he has difficulty 2/2 chronic fatigue and has great difficulty with " concentration.     Of note the patient saw his pulmonologist yesterday who believed that he had some decreased lung function causing him to have some lightheadedness. He has since begun using inhalers which seems to have benefit his symptoms.     Eats a regular diet, not vegetarian.     PAST MEDICAL HISTORY:  Past Medical History:   Diagnosis Date    Allergy     Anxiety     Depression     Hx of psychiatric care     Major depression, chronic 7/6/2017    Psychiatric problem     Recurrent upper respiratory infection (URI)     Therapy        PAST SURGICAL HISTORY:  Past Surgical History:   Procedure Laterality Date    APPENDECTOMY      SINUS SURGERY         CURRENT MEDS:  Current Outpatient Medications   Medication Sig Dispense Refill    albuterol (PROVENTIL/VENTOLIN HFA) 90 mcg/actuation inhaler 2 puffs every 4 hours as needed for cough, wheeze, or shortness of breath 18 g 11    fluticasone-umeclidin-vilanter (TRELEGY ELLIPTA) 200-62.5-25 mcg inhaler Inhale 1 puff into the lungs once daily. 60 each 11    levomefolate calcium (ELFOLATE) 15 mg Tab Take 1 tablet by mouth once a day 90 tablet 2    methylphenidate HCl (CONCERTA) 18 MG CR tablet Take 1 tablet by mouth once a day 30 tablet 0    methylphenidate HCl (CONCERTA) 54 MG CR tablet Take one tablet (54mg) by mouth once daily. 30 tablet 0     No current facility-administered medications for this visit.       ALLERGIES:  Review of patient's allergies indicates:  No Known Allergies    FAMILY HISTORY:  Family History   Problem Relation Age of Onset    Diverticulitis Mother     Hypertension Father     Diabetes Paternal Aunt     Diabetes Paternal Uncle     Cancer Cousin         some type of intestinal cancer    Depression Brother     Melanoma Maternal Grandmother        SOCIAL HISTORY:  Social History     Tobacco Use    Smoking status: Never    Smokeless tobacco: Never   Substance Use Topics    Alcohol use: No    Drug use: No       Review of Systems:  Review of Systems  "  Constitutional:  Positive for malaise/fatigue. Negative for chills, fever and weight loss.   HENT:  Negative for ear discharge, ear pain, hearing loss, sinus pain, sore throat and tinnitus.    Eyes:  Negative for blurred vision, double vision and photophobia.   Respiratory:  Negative for cough, shortness of breath and wheezing.    Cardiovascular:  Negative for chest pain, palpitations and orthopnea.   Gastrointestinal:  Negative for constipation, diarrhea, nausea and vomiting.   Genitourinary:  Negative for dysuria, frequency and urgency.   Musculoskeletal:  Negative for back pain, falls, myalgias and neck pain.   Neurological:  Negative for dizziness (Described as lightheadedness), tingling, seizures, loss of consciousness, weakness and headaches.            Objective:     Vitals:    09/29/23 0952   BP: 120/82   Pulse: 81   Weight: 111 kg (244 lb 11.4 oz)   Height: 6' 4" (1.93 m)       Lab Results   Component Value Date    WBC 5.00 09/26/2023    HGB 14.1 09/26/2023    HCT 39.9 (L) 09/26/2023     (L) 09/26/2023    CHOL 173 03/21/2022    TRIG 131 03/21/2022    HDL 47 03/21/2022    ALT 45 (H) 09/26/2023    AST 31 09/26/2023     09/26/2023    K 4.3 09/26/2023     09/26/2023    CREATININE 1.1 09/26/2023    BUN 12 09/26/2023    CO2 25 09/26/2023    TSH 1.423 03/21/2022    HGBA1C 5.2 03/21/2022    OTBZIOIK92 525 07/19/2018       NEUROLOGICAL EXAMINATION:     MENTAL STATUS   Oriented to person, place, and time.   Level of consciousness: alert    CRANIAL NERVES     CN III, IV, VI   Pupils are equal, round, and reactive to light.  Extraocular motions are normal.     CN V   Facial sensation intact.     CN VII   Facial expression full, symmetric.     CN VIII   CN VIII normal.     CN IX, X   CN IX normal.   CN X normal.     CN XI   CN XI normal.     CN XII   CN XII normal.     MOTOR EXAM   Muscle bulk: normal    Strength   Strength 5/5 throughout.     REFLEXES     Reflexes   Right brachioradialis: 2+  Left " brachioradialis: 2+  Right biceps: 2+  Left biceps: 2+  Right triceps: 2+  Left triceps: 2+  Right patellar: 2+  Left patellar: 2+  Right achilles: 2+  Left achilles: 2+  Right plantar: normal  Left plantar: normal    SENSORY EXAM   Light touch normal.   Vibration normal.     ? ?        Assessment:   Juan José Ng is a 43 y.o. male presenting for evaluation of lightheadedness.     Plan:   I discussed with the patient in depth the risk/benefits of the following plan and the patient was amenable. We discussed the following:     -Will obtain lab work to see if there is any metabolic cause to the patients symptoms     -Given pulmonology evaluation do suspect some of the patients symptoms to be 2/2 impaired lung function and given that the patient reported improvement with use of inhalers and other pulmonary aids do have high level of suspicion that the patients symptoms are 2/2 this      RTC 3 months or sooner if needed    Problem List Items Addressed This Visit          Other    GISELL (obstructive sleep apnea)    Current Assessment & Plan     Does not currently wear CPAP          Other Visit Diagnoses       Lightheadedness    -  Primary    Relevant Orders    Heavy Metals Screen, Blood (Quantitative)    Hepatitis C RNA, Quantitative, PCR    Methylmalonic Acid, Serum    Phosphatidylethanol (PETH)    RPR    Vitamin B6    Vitamin B2    Vitamin B12 Deficiency Panel    Vitamin B1    T4    Sedimentation rate    Paresthesias        Relevant Orders    Heavy Metals Screen, Blood (Quantitative)    Hepatitis C RNA, Quantitative, PCR    Methylmalonic Acid, Serum    Phosphatidylethanol (PETH)    RPR    Vitamin B6    Vitamin B2    Vitamin B12 Deficiency Panel    Vitamin B1    T4    Sedimentation rate    Fatigue, unspecified type        Relevant Orders    IRON AND TIBC    Ferritin              I spent a total of 45 minutes on the day of the visit. This includes face to face time and non-face to face time preparing to see the patient  (eg, review of tests), obtaining and/or reviewing separately obtained history, documenting clinical information in the electronic or other health record, independently interpreting results and communicating results to the patient/family/caregiver, or care coordinator.        Carlee Lizarraga PA-C  Supervising physician Michael Aguilar MD   [facility]

## 2023-09-28 NOTE — PROGRESS NOTES
9/28/2023    Juan José Ng  New Patient Consult    Chief Complaint   Patient presents with    Shortness of Breath         HPI:  9/28/2023 pt had had light headed sensation last 3.5  wks-- no change body position.  Buzzing in head.  No ppt or relief factor.  Will remit and varies and no problem.  Chr anxiety and no worse.   Has a xanax 0.25 but rarely uses and none since dizzy.      Ct chest done     9/21/2022 mucous min yellow colored.  Lost trelegy/symbicort-- pt still 1/4 cup daily.    Afb neg x 3, mac + once in 2018.  Uses symbicort/trelegy but lost in recent move, uses aerobika.  Mucous similar .    Patient Instructions   Would check regular cultures now and repeat in future.  Standing order for every 2 wk sputum cultures placed.  Need to do now and we need to contact you to instruct to use special antibiotic if needed  or use doxycycline.        Will place standing order to re submit sputum for MAC next yr.  Your positive MAC culture in 2018 may have been contaminate.      Would check pulmonary function once lungs treated for infection if needed.    You should stay on symbicort or trelegy and use aerobika-- we discuss cleaning aerobika.         Chronic sinus drainage  may respond to doxycyline -- await culture lung mucous       Could give vest trail if you are in [place]-- would be best after sputum cleared.    Need to get back after sputum culture to instruct specific antibiotic or doxycycline --- will plan to do phone f/u 8 wks.         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  .  Care in past limited by lack of coverage at times.    Pt had had severe freq sinus and lung infections. Looks like he had good ig levels in 2015 but had only 2/14 protection to pneumococcal serotypes.  He had 12/14 protection 2 months later appatently in response to vaccine. 2018 MAC was cultur and sensitive to macrolides. No rx.      Pt produces 1/4 cup yellow mucous daily, and has freq wheezes with no carballo.  Ct  "chest viewed from 4/7 2022 with bronchiectasis apparent - more impressive on 1/2/2019 ct chest.  Tree in bud nodules seen but looked less in 4/2022 than prior.      Pt has severe sinus still with occ abx but vague.  Was given symbicort recently.  No limits.   Patient Instructions   Check for mac resistance,  would start treatment once culture show sensitive.     Check for regular germs in lungs, psuedomonas likely    Use trelegy once daily or symbicort    Use aerobika -- might use vest if recurrent significant lung infections.  Use 15 breaths after trelegy or symbicort or albuterol twice daily    May treat infection if present.                The chief compliant  problem is new to me",   PFSH:  Past Medical History:   Diagnosis Date    Allergy     Anxiety     Depression     Hx of psychiatric care     Major depression, chronic 7/6/2017    Psychiatric problem     Recurrent upper respiratory infection (URI)     Therapy          Past Surgical History:   Procedure Laterality Date    APPENDECTOMY      SINUS SURGERY       Social History     Tobacco Use    Smoking status: Never    Smokeless tobacco: Never   Substance Use Topics    Alcohol use: No    Drug use: No     Family History   Problem Relation Age of Onset    Diverticulitis Mother     Hypertension Father     Diabetes Paternal Aunt     Diabetes Paternal Uncle     Cancer Cousin         some type of intestinal cancer    Depression Brother     Melanoma Maternal Grandmother      Review of patient's allergies indicates:  No Known Allergies    Performance Status:The patient's activity level is functions out of house.      Review of Systems:  a review of eleven systems covering constitutional, Eye, HEENT, Psych, Respiratory, Cardiac, GI, , Musculoskeletal, Endocrine, Dermatologic was negative except for pertinent findings as listed ABOVE and below:  pertinent positive as above, rest is good       Exam:Comprehensive exam done. /88 (BP Location: Left arm, Patient " "Position: Sitting, BP Method: Large (Automatic))   Pulse (!) 114   Ht 6' 4" (1.93 m)   Wt 110.9 kg (244 lb 6.1 oz)   SpO2 98% Comment: on room air at rest  BMI 29.75 kg/m²   Exam included Vitals as listed, and patient's appearance and affect and alertness and mood, oral exam for yeast and hygiene and pharynx lesions and Mallapatti (M) score, neck with inspection for jvd and masses and thyroid abnormalities and lymph nodes (supraclavicular and infraclavicular nodes and axillary also examined and noted if abn), chest exam included symmetry and effort and fremitus and percussion and auscultation, cardiac exam included rhythm and gallops and murmur and rubs and jvd and edema, abdominal exam for mass and hepatosplenomegaly and tenderness and hernias and bowel sounds, Musculoskeletal exam with muscle tone and posture and mobility/gait and  strength, and skin for rashes and cyanosis and pallor and turgor, extremity for clubbing.  Findings were normal except for pertinent findings listed below:  M3, occ wheeze but min ,chest is symmetric, no distress, normal percussion, normal fremitus and rest good               Radiographs (ct chest and cxr) reviewed: view by direct vision  As above    Labs reviewed           PFT will be done and results to be reviewed later      Plan:  Clinical impression is apparently straight forward and impression with management as below.     Juan José was seen today for shortness of breath.    Diagnoses and all orders for this visit:    Bronchiectasis with (acute) exacerbation  -     Discontinue: fluticasone-umeclidin-vilanter (TRELEGY ELLIPTA) 200-62.5-25 mcg inhaler; Inhale 1 puff into the lungs once daily.  -     Humoral Immune Eval (Pneumo Serotypes) With H. Flu; Future  -     albuterol (PROVENTIL/VENTOLIN HFA) 90 mcg/actuation inhaler; 2 puffs every 4 hours as needed for cough, wheeze, or shortness of breath  -     Culture, Respiratory with Gram Stain; Standing  -     " fluticasone-umeclidin-vilanter (TRELEGY ELLIPTA) 200-62.5-25 mcg inhaler; Inhale 1 puff into the lungs once daily.    Bronchiectasis without complication            Follow up in about 2 months (around 11/28/2023), or if symptoms worsen or fail to improve.    Discussed with patient above for education the following:      Patient Instructions   Ct chest viewed from 9/26/2023 with bronchiectasis and mucous plugging...worse than seen 2022.    You likely have bronchiectasis from immune deficiency that was improved by pneumonia vaccine-- immune system may weaken -- should immune test recheck in future  and order placed..        Vest treatment with aerobika is dosed in office.  Mucous is mobilized.      Hyperventilation may cause light headed sensation -- could try re breathing from paper bag.    Anxiety may contribute-- would dose xanax if light headed sensation significant....      Would increase breathing medications as mucous plugs seen-- may contribute to symptoms although not directly.    Use trelegy once daily  Prednisone daily for 3-6 days-- may hype up???  Repeat if needed....    Use albuterol 2-3 puffs  before aerobika at least once or twice daily.    With increase mucous plugging -- do culture now.  May need antibiotic.  Culture sputum if yellow..    Ct was worse with mucous plugging-- need to try to stay on trelegy and hygiene with aerobika/albuterol    You improved sensation with vest and trelegy and paper bag today.    Heart had no significant calcium build up seen.        .                                  Eval took 57 min

## 2023-09-29 ENCOUNTER — OFFICE VISIT (OUTPATIENT)
Dept: NEUROLOGY | Facility: CLINIC | Age: 43
End: 2023-09-29
Payer: COMMERCIAL

## 2023-09-29 VITALS
DIASTOLIC BLOOD PRESSURE: 82 MMHG | SYSTOLIC BLOOD PRESSURE: 120 MMHG | HEIGHT: 76 IN | WEIGHT: 244.69 LBS | BODY MASS INDEX: 29.8 KG/M2 | HEART RATE: 81 BPM

## 2023-09-29 DIAGNOSIS — R20.2 PARESTHESIAS: ICD-10-CM

## 2023-09-29 DIAGNOSIS — R53.83 FATIGUE, UNSPECIFIED TYPE: ICD-10-CM

## 2023-09-29 DIAGNOSIS — R42 LIGHTHEADEDNESS: Primary | ICD-10-CM

## 2023-09-29 DIAGNOSIS — G47.33 OSA (OBSTRUCTIVE SLEEP APNEA): ICD-10-CM

## 2023-09-29 LAB
GRAM STN SPEC: NORMAL

## 2023-09-29 PROCEDURE — 3079F PR MOST RECENT DIASTOLIC BLOOD PRESSURE 80-89 MM HG: ICD-10-PCS | Mod: CPTII,S$GLB,, | Performed by: PHYSICIAN ASSISTANT

## 2023-09-29 PROCEDURE — 3008F PR BODY MASS INDEX (BMI) DOCUMENTED: ICD-10-PCS | Mod: CPTII,S$GLB,, | Performed by: PHYSICIAN ASSISTANT

## 2023-09-29 PROCEDURE — 1160F PR REVIEW ALL MEDS BY PRESCRIBER/CLIN PHARMACIST DOCUMENTED: ICD-10-PCS | Mod: CPTII,S$GLB,, | Performed by: PHYSICIAN ASSISTANT

## 2023-09-29 PROCEDURE — 99999 PR PBB SHADOW E&M-EST. PATIENT-LVL III: ICD-10-PCS | Mod: PBBFAC,,, | Performed by: PHYSICIAN ASSISTANT

## 2023-09-29 PROCEDURE — 1160F RVW MEDS BY RX/DR IN RCRD: CPT | Mod: CPTII,S$GLB,, | Performed by: PHYSICIAN ASSISTANT

## 2023-09-29 PROCEDURE — 3079F DIAST BP 80-89 MM HG: CPT | Mod: CPTII,S$GLB,, | Performed by: PHYSICIAN ASSISTANT

## 2023-09-29 PROCEDURE — 3008F BODY MASS INDEX DOCD: CPT | Mod: CPTII,S$GLB,, | Performed by: PHYSICIAN ASSISTANT

## 2023-09-29 PROCEDURE — 99204 OFFICE O/P NEW MOD 45 MIN: CPT | Mod: S$GLB,,, | Performed by: PHYSICIAN ASSISTANT

## 2023-09-29 PROCEDURE — 1159F PR MEDICATION LIST DOCUMENTED IN MEDICAL RECORD: ICD-10-PCS | Mod: CPTII,S$GLB,, | Performed by: PHYSICIAN ASSISTANT

## 2023-09-29 PROCEDURE — 1159F MED LIST DOCD IN RCRD: CPT | Mod: CPTII,S$GLB,, | Performed by: PHYSICIAN ASSISTANT

## 2023-09-29 PROCEDURE — 3074F SYST BP LT 130 MM HG: CPT | Mod: CPTII,S$GLB,, | Performed by: PHYSICIAN ASSISTANT

## 2023-09-29 PROCEDURE — 3074F PR MOST RECENT SYSTOLIC BLOOD PRESSURE < 130 MM HG: ICD-10-PCS | Mod: CPTII,S$GLB,, | Performed by: PHYSICIAN ASSISTANT

## 2023-09-29 PROCEDURE — 99999 PR PBB SHADOW E&M-EST. PATIENT-LVL III: CPT | Mod: PBBFAC,,, | Performed by: PHYSICIAN ASSISTANT

## 2023-09-29 PROCEDURE — 99204 PR OFFICE/OUTPT VISIT, NEW, LEVL IV, 45-59 MIN: ICD-10-PCS | Mod: S$GLB,,, | Performed by: PHYSICIAN ASSISTANT

## 2023-10-03 ENCOUNTER — OFFICE VISIT (OUTPATIENT)
Dept: CARDIOLOGY | Facility: CLINIC | Age: 43
End: 2023-10-03
Payer: COMMERCIAL

## 2023-10-03 VITALS
HEART RATE: 86 BPM | OXYGEN SATURATION: 99 % | HEIGHT: 76 IN | BODY MASS INDEX: 29.9 KG/M2 | SYSTOLIC BLOOD PRESSURE: 136 MMHG | DIASTOLIC BLOOD PRESSURE: 88 MMHG | WEIGHT: 245.5 LBS

## 2023-10-03 DIAGNOSIS — R42 LIGHT HEADED: ICD-10-CM

## 2023-10-03 DIAGNOSIS — R06.09 DOE (DYSPNEA ON EXERTION): ICD-10-CM

## 2023-10-03 DIAGNOSIS — R53.82 CHRONIC FATIGUE: Primary | ICD-10-CM

## 2023-10-03 DIAGNOSIS — R07.89 OTHER CHEST PAIN: ICD-10-CM

## 2023-10-03 PROCEDURE — 3008F PR BODY MASS INDEX (BMI) DOCUMENTED: ICD-10-PCS | Mod: CPTII,S$GLB,, | Performed by: INTERNAL MEDICINE

## 2023-10-03 PROCEDURE — 99204 PR OFFICE/OUTPT VISIT, NEW, LEVL IV, 45-59 MIN: ICD-10-PCS | Mod: S$GLB,,, | Performed by: INTERNAL MEDICINE

## 2023-10-03 PROCEDURE — 3008F BODY MASS INDEX DOCD: CPT | Mod: CPTII,S$GLB,, | Performed by: INTERNAL MEDICINE

## 2023-10-03 PROCEDURE — 99999 PR PBB SHADOW E&M-EST. PATIENT-LVL III: ICD-10-PCS | Mod: PBBFAC,,, | Performed by: INTERNAL MEDICINE

## 2023-10-03 PROCEDURE — 3075F PR MOST RECENT SYSTOLIC BLOOD PRESS GE 130-139MM HG: ICD-10-PCS | Mod: CPTII,S$GLB,, | Performed by: INTERNAL MEDICINE

## 2023-10-03 PROCEDURE — 99999 PR PBB SHADOW E&M-EST. PATIENT-LVL III: CPT | Mod: PBBFAC,,, | Performed by: INTERNAL MEDICINE

## 2023-10-03 PROCEDURE — 1159F PR MEDICATION LIST DOCUMENTED IN MEDICAL RECORD: ICD-10-PCS | Mod: CPTII,S$GLB,, | Performed by: INTERNAL MEDICINE

## 2023-10-03 PROCEDURE — 99204 OFFICE O/P NEW MOD 45 MIN: CPT | Mod: S$GLB,,, | Performed by: INTERNAL MEDICINE

## 2023-10-03 PROCEDURE — 3079F DIAST BP 80-89 MM HG: CPT | Mod: CPTII,S$GLB,, | Performed by: INTERNAL MEDICINE

## 2023-10-03 PROCEDURE — 1159F MED LIST DOCD IN RCRD: CPT | Mod: CPTII,S$GLB,, | Performed by: INTERNAL MEDICINE

## 2023-10-03 PROCEDURE — 3079F PR MOST RECENT DIASTOLIC BLOOD PRESSURE 80-89 MM HG: ICD-10-PCS | Mod: CPTII,S$GLB,, | Performed by: INTERNAL MEDICINE

## 2023-10-03 PROCEDURE — 3075F SYST BP GE 130 - 139MM HG: CPT | Mod: CPTII,S$GLB,, | Performed by: INTERNAL MEDICINE

## 2023-10-03 NOTE — PROGRESS NOTES
Subjective:   Patient ID:  Juan José Ng is a 43 y.o. male who presents for evaluation of Establish Care, Chest Pain, and Shortness of Breath      HPI43 yo WM with chronic fatigue, lung infections, who has had SOB and CP. Has been to ER with no evidence of ACS. Pain occurs with movement. Also has complaints of feeling light headed all the time.    Review of Systems   Constitutional: Positive for malaise/fatigue. Negative for decreased appetite, fever, weight gain and weight loss.   HENT:  Negative for hearing loss and nosebleeds.    Eyes:  Negative for visual disturbance.   Cardiovascular:  Positive for chest pain and dyspnea on exertion. Negative for claudication, cyanosis, irregular heartbeat, leg swelling, near-syncope, orthopnea, palpitations, paroxysmal nocturnal dyspnea and syncope.   Respiratory:  Positive for shortness of breath. Negative for cough, hemoptysis, sleep disturbances due to breathing, snoring and wheezing.    Endocrine: Negative for cold intolerance, heat intolerance, polydipsia and polyuria.   Hematologic/Lymphatic: Negative for adenopathy and bleeding problem. Does not bruise/bleed easily.   Skin:  Negative for color change, itching, poor wound healing, rash and suspicious lesions.   Musculoskeletal:  Negative for arthritis, back pain, falls, joint pain, joint swelling, muscle cramps, muscle weakness and myalgias.   Gastrointestinal:  Negative for bloating, abdominal pain, change in bowel habit, constipation, flatus, heartburn, hematemesis, hematochezia, hemorrhoids, jaundice, melena, nausea and vomiting.   Genitourinary:  Negative for bladder incontinence, decreased libido, frequency, hematuria, hesitancy and urgency.   Neurological:  Negative for brief paralysis, difficulty with concentration, excessive daytime sleepiness, dizziness, focal weakness, headaches, light-headedness, loss of balance, numbness, vertigo and weakness.   Psychiatric/Behavioral:  Negative for altered mental status,  "depression and memory loss. The patient does not have insomnia and is not nervous/anxious.    Allergic/Immunologic: Negative for environmental allergies, hives and persistent infections.       Objective:   Physical Exam  Constitutional:       General: He is not in acute distress.     Appearance: He is well-developed. He is not diaphoretic.      Comments: /88   Pulse 86   Ht 6' 4" (1.93 m)   Wt 111.4 kg (245 lb 8 oz)   SpO2 99%   BMI 29.88 kg/m²      HENT:      Head: Normocephalic and atraumatic.   Eyes:      General: Lids are normal. No scleral icterus.        Right eye: No discharge.      Conjunctiva/sclera: Conjunctivae normal.      Pupils: Pupils are equal, round, and reactive to light.   Neck:      Thyroid: No thyromegaly.      Vascular: No JVD.      Trachea: No tracheal deviation.   Cardiovascular:      Rate and Rhythm: Normal rate and regular rhythm.      Pulses: Intact distal pulses.           Carotid pulses are 2+ on the right side and 2+ on the left side.       Radial pulses are 2+ on the right side and 2+ on the left side.        Femoral pulses are 2+ on the right side and 2+ on the left side.       Popliteal pulses are 2+ on the right side and 2+ on the left side.        Dorsalis pedis pulses are 2+ on the right side and 2+ on the left side.        Posterior tibial pulses are 2+ on the right side and 2+ on the left side.      Heart sounds: Normal heart sounds, S1 normal and S2 normal. No murmur heard.     No friction rub. No gallop.   Pulmonary:      Effort: Pulmonary effort is normal. No respiratory distress.      Breath sounds: Normal breath sounds. No wheezing or rales.   Chest:      Chest wall: No tenderness.   Abdominal:      General: Bowel sounds are normal. There is no distension.      Palpations: Abdomen is soft. There is no hepatomegaly or mass.      Tenderness: There is no abdominal tenderness. There is no guarding or rebound.   Musculoskeletal:         General: No tenderness. Normal " range of motion.      Cervical back: Normal range of motion and neck supple.   Lymphadenopathy:      Cervical: No cervical adenopathy.   Skin:     General: Skin is warm and dry.      Coloration: Skin is not pale.      Findings: No erythema or rash.   Neurological:      Mental Status: He is alert and oriented to person, place, and time.      Cranial Nerves: No cranial nerve deficit.      Coordination: Coordination normal.      Deep Tendon Reflexes: Reflexes are normal and symmetric.   Psychiatric:         Speech: Speech normal.         Behavior: Behavior normal.         Thought Content: Thought content normal.         Judgment: Judgment normal.         Assessment:      1. Chronic fatigue    2. Other chest pain    3. SNYDER (dyspnea on exertion)    4. Light headed        Plan:   BP and pulse normal in the office while he is stating he feels light headed   Atypical CP  Orders Placed This Encounter   Procedures    Exercise Stress - EKG    Echo     Follow up if symptoms worsen or fail to improve, for Wiil call results.

## 2023-10-09 ENCOUNTER — OFFICE VISIT (OUTPATIENT)
Dept: INTERNAL MEDICINE | Facility: CLINIC | Age: 43
End: 2023-10-09
Payer: COMMERCIAL

## 2023-10-09 ENCOUNTER — LAB VISIT (OUTPATIENT)
Dept: LAB | Facility: HOSPITAL | Age: 43
End: 2023-10-09
Payer: COMMERCIAL

## 2023-10-09 VITALS
SYSTOLIC BLOOD PRESSURE: 114 MMHG | OXYGEN SATURATION: 96 % | WEIGHT: 244.94 LBS | BODY MASS INDEX: 29.83 KG/M2 | HEIGHT: 76 IN | DIASTOLIC BLOOD PRESSURE: 68 MMHG | HEART RATE: 80 BPM

## 2023-10-09 DIAGNOSIS — Z00.00 VISIT FOR ANNUAL HEALTH EXAMINATION: ICD-10-CM

## 2023-10-09 DIAGNOSIS — R93.5 ABNORMAL CT OF THE ABDOMEN: ICD-10-CM

## 2023-10-09 DIAGNOSIS — E04.1 THYROID NODULE: ICD-10-CM

## 2023-10-09 DIAGNOSIS — R53.82 CHRONIC FATIGUE: ICD-10-CM

## 2023-10-09 DIAGNOSIS — R07.89 OTHER CHEST PAIN: ICD-10-CM

## 2023-10-09 DIAGNOSIS — R59.0 LOCALIZED ENLARGED LYMPH NODES: ICD-10-CM

## 2023-10-09 DIAGNOSIS — D69.6 THROMBOCYTOPENIA: ICD-10-CM

## 2023-10-09 DIAGNOSIS — Z00.00 VISIT FOR ANNUAL HEALTH EXAMINATION: Primary | ICD-10-CM

## 2023-10-09 DIAGNOSIS — F32.9 MAJOR DEPRESSION, CHRONIC: ICD-10-CM

## 2023-10-09 DIAGNOSIS — R93.89 ABNORMAL CT OF THE CHEST: ICD-10-CM

## 2023-10-09 DIAGNOSIS — J47.9 BRONCHIECTASIS WITHOUT COMPLICATION: ICD-10-CM

## 2023-10-09 LAB
CHOLEST SERPL-MCNC: 185 MG/DL (ref 120–199)
CHOLEST/HDLC SERPL: 4.5 {RATIO} (ref 2–5)
ESTIMATED AVG GLUCOSE: 97 MG/DL (ref 68–131)
HBA1C MFR BLD: 5 % (ref 4–5.6)
HDLC SERPL-MCNC: 41 MG/DL (ref 40–75)
HDLC SERPL: 22.2 % (ref 20–50)
LDLC SERPL CALC-MCNC: 108.6 MG/DL (ref 63–159)
NONHDLC SERPL-MCNC: 144 MG/DL
TRIGL SERPL-MCNC: 177 MG/DL (ref 30–150)
TSH SERPL DL<=0.005 MIU/L-ACNC: 2.24 UIU/ML (ref 0.4–4)

## 2023-10-09 PROCEDURE — 3074F PR MOST RECENT SYSTOLIC BLOOD PRESSURE < 130 MM HG: ICD-10-PCS | Mod: CPTII,S$GLB,, | Performed by: INTERNAL MEDICINE

## 2023-10-09 PROCEDURE — 99999 PR PBB SHADOW E&M-EST. PATIENT-LVL IV: CPT | Mod: PBBFAC,,, | Performed by: INTERNAL MEDICINE

## 2023-10-09 PROCEDURE — 1160F PR REVIEW ALL MEDS BY PRESCRIBER/CLIN PHARMACIST DOCUMENTED: ICD-10-PCS | Mod: CPTII,S$GLB,, | Performed by: INTERNAL MEDICINE

## 2023-10-09 PROCEDURE — 3008F BODY MASS INDEX DOCD: CPT | Mod: CPTII,S$GLB,, | Performed by: INTERNAL MEDICINE

## 2023-10-09 PROCEDURE — 1160F RVW MEDS BY RX/DR IN RCRD: CPT | Mod: CPTII,S$GLB,, | Performed by: INTERNAL MEDICINE

## 2023-10-09 PROCEDURE — 1159F PR MEDICATION LIST DOCUMENTED IN MEDICAL RECORD: ICD-10-PCS | Mod: CPTII,S$GLB,, | Performed by: INTERNAL MEDICINE

## 2023-10-09 PROCEDURE — 3078F PR MOST RECENT DIASTOLIC BLOOD PRESSURE < 80 MM HG: ICD-10-PCS | Mod: CPTII,S$GLB,, | Performed by: INTERNAL MEDICINE

## 2023-10-09 PROCEDURE — 3074F SYST BP LT 130 MM HG: CPT | Mod: CPTII,S$GLB,, | Performed by: INTERNAL MEDICINE

## 2023-10-09 PROCEDURE — 83036 HEMOGLOBIN GLYCOSYLATED A1C: CPT | Performed by: INTERNAL MEDICINE

## 2023-10-09 PROCEDURE — 99999 PR PBB SHADOW E&M-EST. PATIENT-LVL IV: ICD-10-PCS | Mod: PBBFAC,,, | Performed by: INTERNAL MEDICINE

## 2023-10-09 PROCEDURE — 80061 LIPID PANEL: CPT | Performed by: INTERNAL MEDICINE

## 2023-10-09 PROCEDURE — 84443 ASSAY THYROID STIM HORMONE: CPT | Performed by: INTERNAL MEDICINE

## 2023-10-09 PROCEDURE — 3078F DIAST BP <80 MM HG: CPT | Mod: CPTII,S$GLB,, | Performed by: INTERNAL MEDICINE

## 2023-10-09 PROCEDURE — 99396 PREV VISIT EST AGE 40-64: CPT | Mod: S$GLB,,, | Performed by: INTERNAL MEDICINE

## 2023-10-09 PROCEDURE — 3008F PR BODY MASS INDEX (BMI) DOCUMENTED: ICD-10-PCS | Mod: CPTII,S$GLB,, | Performed by: INTERNAL MEDICINE

## 2023-10-09 PROCEDURE — 99396 PR PREVENTIVE VISIT,EST,40-64: ICD-10-PCS | Mod: S$GLB,,, | Performed by: INTERNAL MEDICINE

## 2023-10-09 PROCEDURE — 1159F MED LIST DOCD IN RCRD: CPT | Mod: CPTII,S$GLB,, | Performed by: INTERNAL MEDICINE

## 2023-10-09 NOTE — PROGRESS NOTES
INTERNAL MEDICINE ESTABLISHED PATIENT VISIT NOTE    Subjective:     Chief Complaint: Annual Exam       Patient ID: Juan José Ng is a 43 y.o. male with chronic rhinitis (see last note for details, has had sinus surgery in the past and followed by ID, allergy, and pulm in the past c neg immunodeficiency w/u, normal PFTs, normal CT sinuses/scope), depression (failed tx c cymbalta, lexapro, zoloft 2/2 inefficacy or s/e), last seen by me 9/2022, here today for routine annual exam.    Had urgent care visit in September for presyncopal symptoms associated with heat.  Was treated conservatively with hydration at home.  Was also seen by ENT several weeks after that and was also advised to stop taking Sucralose.    Had ED visit at the end of last month for issues with chest pain.  Describes symptoms as a heavy pressure over the left side of his chest with radiation to his back.  Also had some numbness in his left finger.  Chest x-ray at that time unremarkable.  Troponin x2 and BNP in the ED within normal limits.  EKG showed septal infarct, age undetermined so was subsequently seen by Cardiology who ordered an echo and an exercise stress test which are scheduled for 10/19.    In the ED, patient also had CTA chest abdomen pelvis which showed possible bronchitis or aspiration and recommended follow-up CT chest in 3-6 months.    Was also seen by Pulmonary who recommended continuing Trelegy and p.r.n. albuterol.  Respiratory cultures were also done with no significant findings.    Thyroid nodules still present on CT.    Was also noted to have mesenteric stranding with associated prominent to enlarged lymph nodes most likely related to mesenteric panniculitis with recommendation for follow-up abdominal CT or abdominal MRI in 3-6 months.  Pt denies abd sx.  No diarrhea, no abd pain, no n/v.  No fever.    Does report issues c persistent fatigue.  Thinks he will apply for disability as he is too tired to work and has brain  fog.        Past Medical History:  Past Medical History:   Diagnosis Date    Allergy     Anxiety     Depression      of psychiatric care     Major depression, chronic 7/6/2017    Psychiatric problem     Recurrent upper respiratory infection (URI)     Therapy        Home Medications:  Prior to Admission medications    Medication Sig Start Date End Date Taking? Authorizing Provider   albuterol (PROVENTIL/VENTOLIN HFA) 90 mcg/actuation inhaler 2 puffs every 4 hours as needed for cough, wheeze, or shortness of breath 9/28/23   Ryan Burleson MD   fluticasone-umeclidin-vilanter (TRELEGY ELLIPTA) 200-62.5-25 mcg inhaler Inhale 1 puff into the lungs once daily. 9/28/23   Ryan Burleson MD   levomefolate calcium (ELFOLATE) 15 mg Tab Take 1 tablet by mouth once a day 9/21/23      methylphenidate HCl (CONCERTA) 18 MG CR tablet Take 1 tablet by mouth once a day 9/21/23      methylphenidate HCl (CONCERTA) 54 MG CR tablet Take one tablet (54mg) by mouth once daily. 9/21/23          Allergies:  Review of patient's allergies indicates:  No Known Allergies    Social History:  Social History     Tobacco Use    Smoking status: Never    Smokeless tobacco: Never   Substance Use Topics    Alcohol use: No    Drug use: No        Review of Systems   Constitutional:  Positive for activity change. Negative for unexpected weight change.   HENT:  Positive for rhinorrhea. Negative for hearing loss and trouble swallowing.    Eyes:  Negative for discharge and visual disturbance.   Respiratory:  Positive for chest tightness. Negative for wheezing.    Cardiovascular:  Positive for chest pain and palpitations.   Gastrointestinal:  Negative for blood in stool, constipation, diarrhea and vomiting.   Endocrine: Negative for polydipsia and polyuria.   Genitourinary:  Negative for difficulty urinating, hematuria and urgency.   Musculoskeletal:  Positive for neck pain. Negative for arthralgias and joint swelling.   Neurological:  Positive for  "weakness. Negative for headaches.   Psychiatric/Behavioral:  Positive for dysphoric mood. Negative for confusion.          Health Maintenance:     Immunizations:   Influenza 11/2019, declined today.  TDap 6/2017  Prevnar 12/2019, Pneumovax 2/2021  COVID 2/2021, 3/2021 Pfizer, advised to schedule repeat.     Cancer Screening:  [facility] [place] rec at 44    Objective:   /68 (BP Location: Left arm, Patient Position: Sitting, BP Method: Large (Manual))   Pulse 80   Ht 6' 4" (1.93 m)   Wt 111.1 kg (244 lb 14.9 oz)   SpO2 96%   BMI 29.81 kg/m²        General: AAO x3, no apparent distress  HEENT: no cervical LAD, no thyroid masses appreciated  CV: RRR, no m/r/g  Pulm: Lungs CTAB, no crackles, no wheezes  Abd: s/NT/ND +BS  Extremities: no c/c/e    Labs:     Lab Results   Component Value Date    WBC 5.00 09/26/2023    HGB 14.1 09/26/2023    HCT 39.9 (L) 09/26/2023    MCV 83 09/26/2023     (L) 09/26/2023     Sodium   Date Value Ref Range Status   09/26/2023 139 136 - 145 mmol/L Final     Potassium   Date Value Ref Range Status   09/26/2023 4.3 3.5 - 5.1 mmol/L Final     Chloride   Date Value Ref Range Status   09/26/2023 106 95 - 110 mmol/L Final     CO2   Date Value Ref Range Status   09/26/2023 25 23 - 29 mmol/L Final     Glucose   Date Value Ref Range Status   09/26/2023 87 70 - 110 mg/dL Final     BUN   Date Value Ref Range Status   09/26/2023 12 6 - 20 mg/dL Final     Creatinine   Date Value Ref Range Status   09/26/2023 1.1 0.5 - 1.4 mg/dL Final     Calcium   Date Value Ref Range Status   09/26/2023 9.3 8.7 - 10.5 mg/dL Final     Total Protein   Date Value Ref Range Status   09/26/2023 7.4 6.0 - 8.4 g/dL Final     Albumin   Date Value Ref Range Status   09/26/2023 4.2 3.5 - 5.2 g/dL Final     Total Bilirubin   Date Value Ref Range Status   09/26/2023 1.1 (H) 0.1 - 1.0 mg/dL Final     Comment:     For infants and newborns, interpretation of results should be based  on gestational age, weight and in agreement with " clinical  observations.    Premature Infant recommended reference ranges:  Up to 24 hours.............<8.0 mg/dL  Up to 48 hours............<12.0 mg/dL  3-5 days..................<15.0 mg/dL  6-29 days.................<15.0 mg/dL       Alkaline Phosphatase   Date Value Ref Range Status   09/26/2023 78 55 - 135 U/L Final     AST   Date Value Ref Range Status   09/26/2023 31 10 - 40 U/L Final     ALT   Date Value Ref Range Status   09/26/2023 45 (H) 10 - 44 U/L Final     Anion Gap   Date Value Ref Range Status   09/26/2023 8 8 - 16 mmol/L Final     eGFR if    Date Value Ref Range Status   03/21/2022 >60.0 >60 mL/min/1.73 m^2 Final     eGFR if non    Date Value Ref Range Status   03/21/2022 >60.0 >60 mL/min/1.73 m^2 Final     Comment:     Calculation used to obtain the estimated glomerular filtration  rate (eGFR) is the CKD-EPI equation.        Lab Results   Component Value Date    HGBA1C 5.2 03/21/2022     Lab Results   Component Value Date    LDLCALC 99.8 03/21/2022     Lab Results   Component Value Date    TSH 1.423 03/21/2022         Assessment/Plan     Juan José was seen today for annual exam.    Diagnoses and all orders for this visit:    Visit for annual health examination  History and physical exam completed.  Health maintenance reviewed as above.  -     Hemoglobin A1C; Future  -     Lipid Panel; Future  -     TSH; Future    Localized enlarged lymph nodes  Abnormal CT of the abdomen  As per HPI  Mesenteric stranding c prominent LN on last CT  Can repeat CT in late Dec.  -     CT Abdomen Pelvis With Contrast; Future    Thyroid nodule  As per HPI  Will send for repeat  -     US Soft Tissue Head Neck Thyroid; Future    Other chest pain  Abnormal CT of the chest  Bronchiectasis without complication  Awaiting Stress test and echo from Cardiology based on previous sx  Advised to keep all appts    Chronic fatigue  Major depression, chronic  Previous w/u overall unrevealing, also seeing psych  for depression and on Focalin but now on Concerta due to issues c med availability  Cont routine f/u    Thrombocytopenia  Previous HIV and hep panel neg.  Mild and stable on last check, no acute issues.      HM as above  RTC in 1 year since followed by subspecialists, sooner if needed.    Lina Silva MD  Department of Internal Medicine - [facility]  10/09/2023

## 2023-10-10 ENCOUNTER — PATIENT MESSAGE (OUTPATIENT)
Dept: PULMONOLOGY | Facility: CLINIC | Age: 43
End: 2023-10-10
Payer: COMMERCIAL

## 2023-10-11 ENCOUNTER — TELEPHONE (OUTPATIENT)
Dept: CARDIOLOGY | Facility: CLINIC | Age: 43
End: 2023-10-11
Payer: COMMERCIAL

## 2023-10-11 DIAGNOSIS — E53.8 B12 DEFICIENCY: Primary | ICD-10-CM

## 2023-10-11 DIAGNOSIS — J47.9 BRONCHIECTASIS WITHOUT COMPLICATION: Primary | ICD-10-CM

## 2023-10-11 RX ORDER — PREDNISONE 20 MG/1
TABLET ORAL
Qty: 12 TABLET | Refills: 0 | Status: SHIPPED | OUTPATIENT
Start: 2023-10-11 | End: 2023-12-11 | Stop reason: SDUPTHER

## 2023-10-11 RX ORDER — AZITHROMYCIN 500 MG/1
TABLET, FILM COATED ORAL
Qty: 3 TABLET | Refills: 3 | Status: SHIPPED | OUTPATIENT
Start: 2023-10-11

## 2023-10-11 RX ORDER — CYANOCOBALAMIN, ISOPROPYL ALCOHOL 1000MCG/ML
1 KIT INJECTION
Qty: 6 KIT | Refills: 11 | Status: SHIPPED | OUTPATIENT
Start: 2023-10-11

## 2023-10-11 NOTE — TELEPHONE ENCOUNTER
Spoke to patient at length.  He is still coughing up yellow sputum.  He stated he will be dropping off another sputum sample.  Pt requesting antibiotic or prednisone based off your last office visited you stated.  Please advise.

## 2023-10-11 NOTE — TELEPHONE ENCOUNTER
Attempted unsuccessfully to reach patient. Left voicemail and Mychart message for patient to call back to discuss rescheduling appointment

## 2023-10-23 ENCOUNTER — PATIENT MESSAGE (OUTPATIENT)
Dept: CARDIOLOGY | Facility: CLINIC | Age: 43
End: 2023-10-23

## 2023-10-23 ENCOUNTER — CLINICAL SUPPORT (OUTPATIENT)
Dept: CARDIOLOGY | Facility: HOSPITAL | Age: 43
End: 2023-10-23
Attending: INTERNAL MEDICINE
Payer: COMMERCIAL

## 2023-10-23 ENCOUNTER — HOSPITAL ENCOUNTER (OUTPATIENT)
Dept: CARDIOLOGY | Facility: HOSPITAL | Age: 43
Discharge: HOME | End: 2023-10-23
Attending: INTERNAL MEDICINE
Payer: COMMERCIAL

## 2023-10-23 VITALS
HEIGHT: 76 IN | BODY MASS INDEX: 29.71 KG/M2 | DIASTOLIC BLOOD PRESSURE: 68 MMHG | WEIGHT: 244 LBS | SYSTOLIC BLOOD PRESSURE: 114 MMHG

## 2023-10-23 DIAGNOSIS — R53.82 CHRONIC FATIGUE: ICD-10-CM

## 2023-10-23 DIAGNOSIS — R06.09 DOE (DYSPNEA ON EXERTION): ICD-10-CM

## 2023-10-23 DIAGNOSIS — R07.89 OTHER CHEST PAIN: ICD-10-CM

## 2023-10-23 LAB
ASCENDING AORTA: 3.38 CM
AV INDEX (PROSTH): 0.81
AV MEAN GRADIENT: 7 MMHG
AV PEAK GRADIENT: 14 MMHG
AV VALVE AREA BY VELOCITY RATIO: 3.16 CM²
AV VALVE AREA: 3.09 CM²
AV VELOCITY RATIO: 0.83
BSA FOR ECHO PROCEDURE: 2.44 M2
CV ECHO LV RWT: 0.47 CM
CV STRESS BASE HR: 83 BPM
DIASTOLIC BLOOD PRESSURE: 82 MMHG
DOP CALC AO PEAK VEL: 1.89 M/S
DOP CALC AO VTI: 35 CM
DOP CALC LVOT AREA: 3.8 CM2
DOP CALC LVOT DIAMETER: 2.21 CM
DOP CALC LVOT PEAK VEL: 1.56 M/S
DOP CALC LVOT STROKE VOLUME: 108.12 CM3
DOP CALCLVOT PEAK VEL VTI: 28.2 CM
E WAVE DECELERATION TIME: 171.68 MSEC
E/A RATIO: 1.25
E/E' RATIO: 6.07 M/S
ECHO LV POSTERIOR WALL: 1.1 CM (ref 0.6–1.1)
FRACTIONAL SHORTENING: 42 % (ref 28–44)
INTERVENTRICULAR SEPTUM: 1.06 CM (ref 0.6–1.1)
IVRT: 76.12 MSEC
LEFT ATRIUM SIZE: 4.03 CM
LEFT ATRIUM VOLUME INDEX MOD: 22.1 ML/M2
LEFT ATRIUM VOLUME MOD: 53.38 CM3
LEFT INTERNAL DIMENSION IN SYSTOLE: 2.7 CM (ref 2.1–4)
LEFT VENTRICLE DIASTOLIC VOLUME INDEX: 41.57 ML/M2
LEFT VENTRICLE DIASTOLIC VOLUME: 100.19 ML
LEFT VENTRICLE MASS INDEX: 75 G/M2
LEFT VENTRICLE SYSTOLIC VOLUME INDEX: 11.2 ML/M2
LEFT VENTRICLE SYSTOLIC VOLUME: 26.98 ML
LEFT VENTRICULAR INTERNAL DIMENSION IN DIASTOLE: 4.66 CM (ref 3.5–6)
LEFT VENTRICULAR MASS: 180.33 G
LV LATERAL E/E' RATIO: 5.69 M/S
LV SEPTAL E/E' RATIO: 6.5 M/S
LVOT MG: 5.15 MMHG
LVOT MV: 1.08 CM/S
MV PEAK A VEL: 0.73 M/S
MV PEAK E VEL: 0.91 M/S
MV STENOSIS PRESSURE HALF TIME: 49.79 MS
MV VALVE AREA P 1/2 METHOD: 4.42 CM2
OHS CV CPX 1 MINUTE RECOVERY HEART RATE: 134 BPM
OHS CV CPX 85 PERCENT MAX PREDICTED HEART RATE MALE: 150
OHS CV CPX ESTIMATED METS: 7
OHS CV CPX MAX PREDICTED HEART RATE: 176
OHS CV CPX PATIENT IS FEMALE: 0
OHS CV CPX PATIENT IS MALE: 1
OHS CV CPX PEAK DIASTOLIC BLOOD PRESSURE: 83 MMHG
OHS CV CPX PEAK HEAR RATE: 155 BPM
OHS CV CPX PEAK RATE PRESSURE PRODUCT: NORMAL
OHS CV CPX PEAK SYSTOLIC BLOOD PRESSURE: 160 MMHG
OHS CV CPX PERCENT MAX PREDICTED HEART RATE ACHIEVED: 88
OHS CV CPX RATE PRESSURE PRODUCT PRESENTING: NORMAL
PISA TR MAX VEL: 2.47 M/S
PULM VEIN S/D RATIO: 0.86
PV PEAK D VEL: 0.63 M/S
PV PEAK S VEL: 0.54 M/S
RA PRESSURE ESTIMATED: 3 MMHG
RIGHT VENTRICULAR END-DIASTOLIC DIMENSION: 3.41 CM
RIGHT VENTRICULAR LENGTH IN DIASTOLE (APICAL 4-CHAMBER VIEW): 7.94 CM
RV MID DIAMA: 2.27 CM
RV TB RVSP: 5 MMHG
RV TISSUE DOPPLER FREE WALL SYSTOLIC VELOCITY 1 (APICAL 4 CHAMBER VIEW): 19.33 CM/S
SINUS: 3.32 CM
STJ: 3.21 CM
STRESS ECHO POST EXERCISE DUR MIN: 6 MINUTES
SYSTOLIC BLOOD PRESSURE: 132 MMHG
TDI LATERAL: 0.16 M/S
TDI SEPTAL: 0.14 M/S
TDI: 0.15 M/S
TR MAX PG: 24 MMHG
TRICUSPID ANNULAR PLANE SYSTOLIC EXCURSION: 2.84 CM
TV REST PULMONARY ARTERY PRESSURE: 27 MMHG
Z-SCORE OF LEFT VENTRICULAR DIMENSION IN END DIASTOLE: -8.75
Z-SCORE OF LEFT VENTRICULAR DIMENSION IN END SYSTOLE: -7.17

## 2023-10-23 PROCEDURE — 93018 CV STRESS TEST I&R ONLY: CPT | Mod: ,,, | Performed by: INTERNAL MEDICINE

## 2023-10-23 PROCEDURE — 93016 EXERCISE STRESS - EKG (CUPID ONLY): ICD-10-PCS | Mod: ,,, | Performed by: INTERNAL MEDICINE

## 2023-10-23 PROCEDURE — 93018 EXERCISE STRESS - EKG (CUPID ONLY): ICD-10-PCS | Mod: ,,, | Performed by: INTERNAL MEDICINE

## 2023-10-23 PROCEDURE — 93306 TTE W/DOPPLER COMPLETE: CPT | Mod: PO

## 2023-10-23 PROCEDURE — 93016 CV STRESS TEST SUPVJ ONLY: CPT | Mod: ,,, | Performed by: INTERNAL MEDICINE

## 2023-10-23 PROCEDURE — 93306 ECHO (CUPID ONLY): ICD-10-PCS | Mod: 26,,, | Performed by: INTERNAL MEDICINE

## 2023-10-23 PROCEDURE — 93017 CV STRESS TEST TRACING ONLY: CPT | Mod: PO

## 2023-10-23 PROCEDURE — 93306 TTE W/DOPPLER COMPLETE: CPT | Mod: 26,,, | Performed by: INTERNAL MEDICINE

## 2023-11-08 ENCOUNTER — TELEPHONE (OUTPATIENT)
Dept: PULMONOLOGY | Facility: CLINIC | Age: 43
End: 2023-11-08
Payer: COMMERCIAL

## 2023-11-08 NOTE — TELEPHONE ENCOUNTER
----- Message from Summer Guillen, Patient Care Assistant sent at 11/8/2023 12:38 PM CST -----  Contact: self  Pt is calling to speak w/ a nurse regarding meds [phone]  thanks

## 2023-11-08 NOTE — TELEPHONE ENCOUNTER
Placed a call to the pt in regards to his trelegy medication. Pt's medication is too expensiive. Pt has an appointment on 11/30/23. [employer] application was given. Pt verbalized understanding.

## 2023-12-01 ENCOUNTER — OFFICE VISIT (OUTPATIENT)
Dept: DERMATOLOGY | Facility: CLINIC | Age: 43
End: 2023-12-01
Payer: COMMERCIAL

## 2023-12-01 VITALS — WEIGHT: 244 LBS | BODY MASS INDEX: 29.7 KG/M2

## 2023-12-01 DIAGNOSIS — L91.8 SKIN TAG: ICD-10-CM

## 2023-12-01 DIAGNOSIS — D18.01 CHERRY ANGIOMA: ICD-10-CM

## 2023-12-01 DIAGNOSIS — D22.9 NEVUS OF MULTIPLE SITES: ICD-10-CM

## 2023-12-01 DIAGNOSIS — D23.9 ANGIOKERATOMA: ICD-10-CM

## 2023-12-01 DIAGNOSIS — L28.2 PRURIGO: Primary | ICD-10-CM

## 2023-12-01 DIAGNOSIS — L82.1 SK (SEBORRHEIC KERATOSIS): ICD-10-CM

## 2023-12-01 PROCEDURE — 99214 PR OFFICE/OUTPT VISIT, EST, LEVL IV, 30-39 MIN: ICD-10-PCS | Mod: S$GLB,,, | Performed by: DERMATOLOGY

## 2023-12-01 PROCEDURE — 99214 OFFICE O/P EST MOD 30 MIN: CPT | Mod: S$GLB,,, | Performed by: DERMATOLOGY

## 2023-12-01 PROCEDURE — 1159F PR MEDICATION LIST DOCUMENTED IN MEDICAL RECORD: ICD-10-PCS | Mod: CPTII,S$GLB,, | Performed by: DERMATOLOGY

## 2023-12-01 PROCEDURE — 1159F MED LIST DOCD IN RCRD: CPT | Mod: CPTII,S$GLB,, | Performed by: DERMATOLOGY

## 2023-12-01 PROCEDURE — 99999 PR PBB SHADOW E&M-EST. PATIENT-LVL III: ICD-10-PCS | Mod: PBBFAC,,, | Performed by: DERMATOLOGY

## 2023-12-01 PROCEDURE — 3008F PR BODY MASS INDEX (BMI) DOCUMENTED: ICD-10-PCS | Mod: CPTII,S$GLB,, | Performed by: DERMATOLOGY

## 2023-12-01 PROCEDURE — 1160F PR REVIEW ALL MEDS BY PRESCRIBER/CLIN PHARMACIST DOCUMENTED: ICD-10-PCS | Mod: CPTII,S$GLB,, | Performed by: DERMATOLOGY

## 2023-12-01 PROCEDURE — 99999 PR PBB SHADOW E&M-EST. PATIENT-LVL III: CPT | Mod: PBBFAC,,, | Performed by: DERMATOLOGY

## 2023-12-01 PROCEDURE — 3008F BODY MASS INDEX DOCD: CPT | Mod: CPTII,S$GLB,, | Performed by: DERMATOLOGY

## 2023-12-01 PROCEDURE — 1160F RVW MEDS BY RX/DR IN RCRD: CPT | Mod: CPTII,S$GLB,, | Performed by: DERMATOLOGY

## 2023-12-01 PROCEDURE — 3044F PR MOST RECENT HEMOGLOBIN A1C LEVEL <7.0%: ICD-10-PCS | Mod: CPTII,S$GLB,, | Performed by: DERMATOLOGY

## 2023-12-01 PROCEDURE — 3044F HG A1C LEVEL LT 7.0%: CPT | Mod: CPTII,S$GLB,, | Performed by: DERMATOLOGY

## 2023-12-01 RX ORDER — FLUOCINONIDE TOPICAL SOLUTION USP, 0.05% 0.5 MG/ML
SOLUTION TOPICAL
Qty: 60 ML | Refills: 6 | Status: SHIPPED | OUTPATIENT
Start: 2023-12-01

## 2023-12-01 NOTE — PROGRESS NOTES
Subjective:      Patient ID:  Juan José Ng is a 43 y.o. male who presents for   Chief Complaint   Patient presents with    Skin Check     TBSE     Would like skin check, the lesion removed from his foot previously was a benign wart (see note Dr Zhou).  No lesions of concern, has a habit of picking at his scalp does not itch.         Review of Systems   Constitutional:  Negative for fever, chills, weight loss, weight gain, fatigue, night sweats and malaise.   Skin:  Negative for daily sunscreen use, activity-related sunscreen use and wears hat.   Hematologic/Lymphatic: Does not bruise/bleed easily.       Objective:   Physical Exam   Constitutional: He appears well-developed and well-nourished. No distress.   Genitourinary:         Neurological: He is alert and oriented to person, place, and time. He is not disoriented.   Psychiatric: He has a normal mood and affect.   Skin:   Areas Examined (abnormalities noted in diagram):   Scalp / Hair Palpated and Inspected  Head / Face Inspection Performed  Neck Inspection Performed  Chest / Axilla Inspection Performed  Abdomen Inspection Performed  Genitals / Buttocks / Groin Inspection Performed  Back Inspection Performed  RUE Inspected  LUE Inspection Performed  RLE Inspected  LLE Inspection Performed  Nails and Digits Inspection Performed                 Diagram Legend     Erythematous scaling macule/papule c/w actinic keratosis       Vascular papule c/w angioma      Pigmented verrucoid papule/plaque c/w seborrheic keratosis      Yellow umbilicated papule c/w sebaceous hyperplasia      Irregularly shaped tan macule c/w lentigo     1-2 mm smooth white papules consistent with Milia      Movable subcutaneous cyst with punctum c/w epidermal inclusion cyst      Subcutaneous movable cyst c/w pilar cyst      Firm pink to brown papule c/w dermatofibroma      Pedunculated fleshy papule(s) c/w skin tag(s)      Evenly pigmented macule c/w junctional nevus     Mildly  "variegated pigmented, slightly irregular-bordered macule c/w mildly atypical nevus      Flesh colored to evenly pigmented papule c/w intradermal nevus       Pink pearly papule/plaque c/w basal cell carcinoma      Erythematous hyperkeratotic cursted plaque c/w SCC      Surgical scar with no sign of skin cancer recurrence      Open and closed comedones      Inflammatory papules and pustules      Verrucoid papule consistent consistent with wart     Erythematous eczematous patches and plaques     Dystrophic onycholytic nail with subungual debris c/w onychomycosis     Umbilicated papule    Erythematous-base heme-crusted tan verrucoid plaque consistent with inflamed seborrheic keratosis     Erythematous Silvery Scaling Plaque c/w Psoriasis     See annotation      Assessment / Plan:        Prurigo  -     fluocinonide (LIDEX) 0.05 % external solution; Use hs for scalp  Dispense: 60 mL; Refill: 6  Wear a cap to decrease picking    Nevus of multiple sites  The "ABCD" rules to observe pigmented lesions were reviewed.  Brochure provided      Skin tag  reassurance    Cherry angiomas  This is a benign vascular lesion. Reassurance given. No treatment required.       Angiokeratoma vs sk scrotum  reassurance  Call if grows, bleeds    SK (seborrheic keratosis)  Seborrheic keratosis scattered, told benign no treatment needed.                 Follow up in about 1 year (around 12/1/2024).  "

## 2023-12-11 ENCOUNTER — OFFICE VISIT (OUTPATIENT)
Dept: PULMONOLOGY | Facility: CLINIC | Age: 43
End: 2023-12-11
Payer: COMMERCIAL

## 2023-12-11 VITALS
WEIGHT: 246.56 LBS | HEIGHT: 76 IN | DIASTOLIC BLOOD PRESSURE: 84 MMHG | BODY MASS INDEX: 30.02 KG/M2 | HEART RATE: 75 BPM | OXYGEN SATURATION: 98 % | SYSTOLIC BLOOD PRESSURE: 128 MMHG

## 2023-12-11 DIAGNOSIS — J47.9 BRONCHIECTASIS WITHOUT COMPLICATION: Primary | ICD-10-CM

## 2023-12-11 DIAGNOSIS — G47.33 OSA (OBSTRUCTIVE SLEEP APNEA): ICD-10-CM

## 2023-12-11 DIAGNOSIS — D84.9 IMMUNODEFICIENCY: ICD-10-CM

## 2023-12-11 DIAGNOSIS — J47.1 BRONCHIECTASIS WITH (ACUTE) EXACERBATION: ICD-10-CM

## 2023-12-11 DIAGNOSIS — J32.9 RECURRENT SINUSITIS: ICD-10-CM

## 2023-12-11 PROCEDURE — 99999 PR PBB SHADOW E&M-EST. PATIENT-LVL III: CPT | Mod: PBBFAC,,, | Performed by: INTERNAL MEDICINE

## 2023-12-11 PROCEDURE — 1159F PR MEDICATION LIST DOCUMENTED IN MEDICAL RECORD: ICD-10-PCS | Mod: CPTII,S$GLB,, | Performed by: INTERNAL MEDICINE

## 2023-12-11 PROCEDURE — 99999 PR PBB SHADOW E&M-EST. PATIENT-LVL III: ICD-10-PCS | Mod: PBBFAC,,, | Performed by: INTERNAL MEDICINE

## 2023-12-11 PROCEDURE — 3074F PR MOST RECENT SYSTOLIC BLOOD PRESSURE < 130 MM HG: ICD-10-PCS | Mod: CPTII,S$GLB,, | Performed by: INTERNAL MEDICINE

## 2023-12-11 PROCEDURE — 3008F PR BODY MASS INDEX (BMI) DOCUMENTED: ICD-10-PCS | Mod: CPTII,S$GLB,, | Performed by: INTERNAL MEDICINE

## 2023-12-11 PROCEDURE — 99215 PR OFFICE/OUTPT VISIT, EST, LEVL V, 40-54 MIN: ICD-10-PCS | Mod: S$GLB,,, | Performed by: INTERNAL MEDICINE

## 2023-12-11 PROCEDURE — 99215 OFFICE O/P EST HI 40 MIN: CPT | Mod: S$GLB,,, | Performed by: INTERNAL MEDICINE

## 2023-12-11 PROCEDURE — 3044F PR MOST RECENT HEMOGLOBIN A1C LEVEL <7.0%: ICD-10-PCS | Mod: CPTII,S$GLB,, | Performed by: INTERNAL MEDICINE

## 2023-12-11 PROCEDURE — 1159F MED LIST DOCD IN RCRD: CPT | Mod: CPTII,S$GLB,, | Performed by: INTERNAL MEDICINE

## 2023-12-11 PROCEDURE — 3008F BODY MASS INDEX DOCD: CPT | Mod: CPTII,S$GLB,, | Performed by: INTERNAL MEDICINE

## 2023-12-11 PROCEDURE — 3079F DIAST BP 80-89 MM HG: CPT | Mod: CPTII,S$GLB,, | Performed by: INTERNAL MEDICINE

## 2023-12-11 PROCEDURE — 3044F HG A1C LEVEL LT 7.0%: CPT | Mod: CPTII,S$GLB,, | Performed by: INTERNAL MEDICINE

## 2023-12-11 PROCEDURE — 3074F SYST BP LT 130 MM HG: CPT | Mod: CPTII,S$GLB,, | Performed by: INTERNAL MEDICINE

## 2023-12-11 PROCEDURE — 3079F PR MOST RECENT DIASTOLIC BLOOD PRESSURE 80-89 MM HG: ICD-10-PCS | Mod: CPTII,S$GLB,, | Performed by: INTERNAL MEDICINE

## 2023-12-11 RX ORDER — ALBUTEROL SULFATE 90 UG/1
AEROSOL, METERED RESPIRATORY (INHALATION)
Qty: 18 G | Refills: 11 | Status: SHIPPED | OUTPATIENT
Start: 2023-12-11

## 2023-12-11 RX ORDER — PREDNISONE 20 MG/1
TABLET ORAL
Qty: 15 TABLET | Refills: 0 | Status: SHIPPED | OUTPATIENT
Start: 2023-12-11

## 2023-12-11 RX ORDER — AZITHROMYCIN 500 MG/1
500 TABLET, FILM COATED ORAL
Qty: 14 TABLET | Refills: 5 | Status: SHIPPED | OUTPATIENT
Start: 2023-12-11

## 2023-12-11 RX ORDER — PNEUMOCOCCAL 20-VALENT CONJUGATE VACCINE 2.2; 2.2; 2.2; 2.2; 2.2; 2.2; 2.2; 2.2; 2.2; 2.2; 2.2; 2.2; 2.2; 2.2; 2.2; 2.2; 4.4; 2.2; 2.2; 2.2 UG/.5ML; UG/.5ML; UG/.5ML; UG/.5ML; UG/.5ML; UG/.5ML; UG/.5ML; UG/.5ML; UG/.5ML; UG/.5ML; UG/.5ML; UG/.5ML; UG/.5ML; UG/.5ML; UG/.5ML; UG/.5ML; UG/.5ML; UG/.5ML; UG/.5ML; UG/.5ML
0.5 INJECTION, SUSPENSION INTRAMUSCULAR ONCE
Qty: 0.5 ML | Refills: 0 | Status: SHIPPED | OUTPATIENT
Start: 2023-12-11 | End: 2023-12-11

## 2023-12-11 NOTE — PATIENT INSTRUCTIONS
Pneumovax was likely dosed in 2015 and again in 2021.  Prevnar was dosed in 2019-- titers falling low--- re vaccine with prevnar (pneumovax not available) ---  azithromycin trial  3 days a wk.      Sleep study in 2018 had ahi 39.  Ahi very high -- should cause problem---  Suggest f/u for inspire device consideration.    Prednisone reasonable short term -- dose once a month if needed -- but should be having some lung improvement.....  pay attention to lungs-- note sputum production......    Check lung function -- if asthma component seen other therapies may help...          Stop trelegy. Use albuterol 3-4 puffs with aerobika twice daily-- would check breathing test -- bronchiectasis should not affect breathing directly........     mucous plugging should be just bronchiectasis-- trelegy and others would be good for asthma/copd.        Will ask staff to arrange home sleep study.  Will refer to ent for inpsire evaluation if stop breathing over15...

## 2023-12-11 NOTE — PROGRESS NOTES
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  Light headed sensation resolved.  Pt has no eds but exhausted...    Pt brings up yellow mucous  - - up to 1/4 cup daily.  Continious sinus infections had resolved with pneumovax/prevnar.    Pt had f/u pneumococcal titers trending low.           9/28/2023 pt had had light headed sensation last 3.5  wks-- no change body position.  Buzzing in head.  No ppt or relief factor.  Will remit and varies and no problem.  Chr anxiety and no worse.   Has a xanax 0.25 but rarely uses and none since dizzy.      Ct chest done   Patient Instructions   Ct chest viewed from 9/26/2023 with bronchiectasis and mucous plugging...worse than seen 2022.    You likely have bronchiectasis from immune deficiency that was improved by pneumonia vaccine-- immune system may weaken -- should immune test recheck in future  and order placed..        Vest treatment with aerobika is dosed in office.  Mucous is mobilized.      Hyperventilation may cause light headed sensation -- could try re breathing from paper bag.    Anxiety may contribute-- would dose xanax if light headed sensation significant....      Would increase breathing medications as mucous plugs seen-- may contribute to symptoms although not directly.    Use trelegy once daily  Prednisone daily for 3-6 days-- may hype up???  Repeat if needed....    Use albuterol 2-3 puffs  before aerobika at least once or twice daily.    With increase mucous plugging -- do culture now.  May need antibiotic.  Culture sputum if yellow..    Ct was worse with mucous plugging-- need to try to stay on trelegy and hygiene with aerobika/albuterol    You improved sensation with vest and trelegy and paper bag today.    Heart had no significant calcium build up seen.    9/21/2022 mucous min yellow colored.  Lost trelegy/symbicort-- pt still 1/4  cup daily.    Afb neg x 3, mac + once in 2018.  Uses symbicort/trelegy but lost in recent move, uses aerobika.  Mucous similar .    Patient Instructions   Would check regular cultures now and repeat in future.  Standing order for every 2 wk sputum cultures placed.  Need to do now and we need to contact you to instruct to use special antibiotic if needed  or use doxycycline.        Will place standing order to re submit sputum for MAC next yr.  Your positive MAC culture in 2018 may have been contaminate.      Would check pulmonary function once lungs treated for infection if needed.    You should stay on symbicort or trelegy and use aerobika-- we discuss cleaning aerobika.         Chronic sinus drainage  may respond to doxycyline -- await culture lung mucous       Could give vest trail if you are in [place]-- would be best after sputum cleared.    Need to get back after sputum culture to instruct specific antibiotic or doxycycline --- will plan to do phone f/u 8 wks.         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  .  Care in past limited by lack of coverage at times.    Pt had had severe freq sinus and lung infections. Looks like he had good ig levels in 2015 but had only 2/14 protection to pneumococcal serotypes.  He had 12/14 protection 2 months later appatently in response to vaccine. 2018 MAC was cultur and sensitive to macrolides. No rx.      Pt produces 1/4 cup yellow mucous daily, and has freq wheezes with no carballo.  Ct chest viewed from 4/7 2022 with bronchiectasis apparent - more impressive on 1/2/2019 ct chest.  Tree in bud nodules seen but looked less in 4/2022 than prior.      Pt has severe sinus still with occ abx but vague.  Was given symbicort recently.  No limits.   Patient Instructions   Check for mac resistance,  would start treatment once culture show sensitive.     Check for regular germs in lungs, psuedomonas likely    Use trelegy once daily or symbicort    Use aerobika -- might use  "vest if recurrent significant lung infections.  Use 15 breaths after trelegy or symbicort or albuterol twice daily    May treat infection if present.                The chief compliant  problem is new to me",   PFSH:  Past Medical History:   Diagnosis Date    Allergy     Anxiety     Depression     Hx of psychiatric care     Major depression, chronic 7/6/2017    Psychiatric problem     Recurrent upper respiratory infection (URI)     Therapy          Past Surgical History:   Procedure Laterality Date    APPENDECTOMY      SINUS SURGERY       Social History     Tobacco Use    Smoking status: Never    Smokeless tobacco: Never   Substance Use Topics    Alcohol use: No    Drug use: No     Family History   Problem Relation Age of Onset    Diverticulitis Mother     Hypertension Father     Diabetes Paternal Aunt     Diabetes Paternal Uncle     Cancer Cousin         some type of intestinal cancer    Depression Brother     Melanoma Maternal Grandmother      Review of patient's allergies indicates:  No Known Allergies    Performance Status:The patient's activity level is functions out of house.      Review of Systems:  a review of eleven systems covering constitutional, Eye, HEENT, Psych, Respiratory, Cardiac, GI, , Musculoskeletal, Endocrine, Dermatologic was negative except for pertinent findings as listed ABOVE and below:  pertinent positive as above, rest is good       Exam:Comprehensive exam done. /84 (BP Location: Right arm, Patient Position: Sitting, BP Method: Small (Automatic))   Pulse 75   Ht 6' 4" (1.93 m)   Wt 111.8 kg (246 lb 9.4 oz)   SpO2 98% Comment: on room air at rest  BMI 30.02 kg/m²   Exam included Vitals as listed, and patient's appearance and affect and alertness and mood, oral exam for yeast and hygiene and pharynx lesions and Mallapatti (M) score, neck with inspection for jvd and masses and thyroid abnormalities and lymph nodes (supraclavicular and infraclavicular nodes and axillary also " examined and noted if abn), chest exam included symmetry and effort and fremitus and percussion and auscultation, cardiac exam included rhythm and gallops and murmur and rubs and jvd and edema, abdominal exam for mass and hepatosplenomegaly and tenderness and hernias and bowel sounds, Musculoskeletal exam with muscle tone and posture and mobility/gait and  strength, and skin for rashes and cyanosis and pallor and turgor, extremity for clubbing.  Findings were normal except for pertinent findings listed below:  M3, occ wheeze but min ,chest is symmetric, no distress, normal percussion, normal fremitus and rest good               Radiographs (ct chest and cxr) reviewed: view by direct vision  As above    Labs reviewed           PFT will be done and results to be reviewed later      Plan:  Clinical impression is apparently straight forward and impression with management as below.     Juan José was seen today for follow-up.    Diagnoses and all orders for this visit:    Bronchiectasis without complication  -     predniSONE (DELTASONE) 20 MG tablet; Take one daily for 3 days and may repeat for shortness of breath.    Immunodeficiency  -     pneumoc 20-jazmine conj-dip cr,PF, (PREVNAR 20, PF,) 0.5 mL Syrg injection; Inject 0.5 mLs into the muscle once. for 1 dose  -     azithromycin (ZITHROMAX) 500 MG tablet; Take 1 tablet (500 mg total) by mouth every Mon, Wed, Fri.    GISELL (obstructive sleep apnea)    Recurrent sinusitis    Bronchiectasis with (acute) exacerbation  -     albuterol (PROVENTIL/VENTOLIN HFA) 90 mcg/actuation inhaler; 2 puffs every 4 hours as needed for cough, wheeze, or shortness of breath              No follow-ups on file.    Discussed with patient above for education the following:      Patient Instructions   Pneumovax was likely dosed in 2015 and again in 2021.  Prevnar was dosed in 2019-- titers falling low--- re vaccine with prevnar (pneumovax not available) ---  azithromycin trial  3 days a wk.      Sleep  study in 2018 had ahi 39.  Ahi very high -- should cause problem---  Suggest f/u for inspire device consideration.    Prednisone reasonable short term -- dose once a month if needed -- but should be having some lung improvement.....  pay attention to lungs-- note sputum production......    Check lung function -- if asthma component seen other therapies may help...          Stop trelegy. Use albuterol 3-4 puffs with aerobika twice daily-- would check breathing test -- bronchiectasis should not affect breathing directly........     mucous plugging should be just bronchiectasis-- trelegy and others would be good for asthma/copd.        Will ask staff to arrange home sleep study.  Will refer to ent for inpsire evaluation if stop breathing over15...              Jeanna took 42 min

## 2023-12-14 ENCOUNTER — TELEPHONE (OUTPATIENT)
Dept: PULMONOLOGY | Facility: CLINIC | Age: 43
End: 2023-12-14
Payer: COMMERCIAL

## 2024-01-10 ENCOUNTER — OFFICE VISIT (OUTPATIENT)
Dept: URGENT CARE | Facility: CLINIC | Age: 44
End: 2024-01-10
Payer: COMMERCIAL

## 2024-01-10 VITALS
SYSTOLIC BLOOD PRESSURE: 123 MMHG | BODY MASS INDEX: 29.96 KG/M2 | DIASTOLIC BLOOD PRESSURE: 84 MMHG | HEART RATE: 84 BPM | RESPIRATION RATE: 18 BRPM | TEMPERATURE: 98 F | WEIGHT: 246 LBS | HEIGHT: 76 IN | OXYGEN SATURATION: 98 %

## 2024-01-10 DIAGNOSIS — T50.A95A: ICD-10-CM

## 2024-01-10 DIAGNOSIS — L03.114 CELLULITIS OF LEFT UPPER ARM: Primary | ICD-10-CM

## 2024-01-10 PROCEDURE — 99213 OFFICE O/P EST LOW 20 MIN: CPT | Mod: S$GLB,,,

## 2024-01-10 RX ORDER — SULFAMETHOXAZOLE AND TRIMETHOPRIM 800; 160 MG/1; MG/1
1 TABLET ORAL 2 TIMES DAILY
Qty: 14 TABLET | Refills: 0 | Status: SHIPPED | OUTPATIENT
Start: 2024-01-10 | End: 2024-01-10 | Stop reason: CLARIF

## 2024-01-10 RX ORDER — SULFAMETHOXAZOLE AND TRIMETHOPRIM 800; 160 MG/1; MG/1
1 TABLET ORAL 2 TIMES DAILY
Qty: 14 TABLET | Refills: 0 | Status: SHIPPED | OUTPATIENT
Start: 2024-01-10 | End: 2024-01-10

## 2024-01-10 RX ORDER — SULFAMETHOXAZOLE AND TRIMETHOPRIM 800; 160 MG/1; MG/1
1 TABLET ORAL 2 TIMES DAILY
Qty: 14 TABLET | Refills: 0 | OUTPATIENT
Start: 2024-01-10 | End: 2024-01-10

## 2024-01-10 NOTE — PROGRESS NOTES
"Subjective:      Patient ID: Juan José Ng is a 43 y.o. male.    Vitals:  height is 6' 4" (1.93 m) and weight is 111.6 kg (246 lb). His oral temperature is 97.9 °F (36.6 °C). His blood pressure is 123/84 and his pulse is 84. His respiration is 18 and oxygen saturation is 98%.     Chief Complaint: Rash    This is a 43 y.o. male who presents today with a chief complaint of rash. On Friday patient went get a vaccine and notice a rash afterwards.       Provider note starts below:  Patient presents to clinic c/o rash to left upper arm which onset gradually several days ago. Patient received pneumonia vaccine (pneumonococcal conjugate 20 valent) in the left upper arm on 1/5/2023. He reports feeling very sick later that evening, with fever, headache, body aches. Symptoms have improved since then however his rash has gradually worsened. He reports spreading of the redness, some itching of the rash, and pain in the entire left upper arm. He tried using cortisone cream on the area at home which did not provide any relief. Of note, patient currently on azithromycin for pulmonary infection. Patient states he is immunocompromised.         Rash  This is a new problem. The current episode started in the past 7 days. The affected locations include the left arm. The rash is characterized by burning, pain, itchiness and redness. Associated with: vaccine. Pertinent negatives include no cough, fever, shortness of breath or vomiting. Past treatments include antibiotic cream. The treatment provided no relief.     Constitution: Negative for chills and fever.   Cardiovascular:  Negative for chest pain and palpitations.   Respiratory:  Negative for cough and shortness of breath.    Gastrointestinal:  Negative for nausea and vomiting.   Musculoskeletal:  Positive for muscle ache (left upper arm). Negative for muscle cramps.   Skin:  Positive for rash. Negative for wound, laceration and bruising.   Neurological:  Negative for dizziness and " headaches.      Objective:     Physical Exam   Constitutional: He is oriented to person, place, and time. No distress.   HENT:   Head: Normocephalic and atraumatic.   Eyes: Conjunctivae are normal. Extraocular movement intact   Neck: Neck supple.   Cardiovascular: Normal rate and regular rhythm.   Pulmonary/Chest: No stridor. No respiratory distress.   Abdominal: Normal appearance.   Musculoskeletal: Normal range of motion.         General: Normal range of motion.   Neurological: He is alert, oriented to person, place, and time and at baseline.   Skin: Skin is warm, dry and rash (Large area of erythema to anterior and lateral left upper arm that is warm to touch, tender, and blanches with palpation. There are no lesions.).   Psychiatric: His behavior is normal. Mood normal.   Nursing note and vitals reviewed.      Assessment:     1. Cellulitis of left upper arm    2. Adverse reaction to pneumococcal vaccine, initial encounter      Plan:     Cellulitis of left upper arm  -     sulfamethoxazole-trimethoprim 800-160mg (BACTRIM DS) 800-160 mg Tab; Take 1 tablet by mouth 2 (two) times daily. for 7 days  Dispense: 14 tablet; Refill: 0    Adverse reaction to pneumococcal vaccine, initial encounter      Patient Instructions   I have prescribed antibiotics, please take to completion.  You may take ibuprofen or tylenol to help with pain.  You may apply ice to the area to help with swelling and provide some comfort.  If the rash spreads, worsens, or does not improve, please return to urgent care for re-evaluation.  Follow up with your primary care doctor.

## 2024-01-10 NOTE — PATIENT INSTRUCTIONS
I have prescribed antibiotics, please take to completion.  You may take ibuprofen or tylenol to help with pain.  You may apply ice to the area to help with swelling and provide some comfort.  If the rash spreads, worsens, or does not improve, please return to urgent care for re-evaluation.  Follow up with your primary care doctor.

## 2024-01-11 NOTE — PROGRESS NOTES
Patient returned stating pharmacy did not receive prescription. Attempted to print out prescription here in clinic with no success. Tried to call [facility] with no answer. Canceled all prescriptions and hand-wrote prescription for medication. Patient was given physical prescription to take to pharmacy.

## 2024-01-18 ENCOUNTER — TELEPHONE (OUTPATIENT)
Dept: PULMONOLOGY | Facility: CLINIC | Age: 44
End: 2024-01-18
Payer: COMMERCIAL

## 2024-01-18 DIAGNOSIS — G47.33 OSA (OBSTRUCTIVE SLEEP APNEA): Primary | ICD-10-CM

## 2024-01-18 NOTE — TELEPHONE ENCOUNTER
Please sign addended referral to ENT.  Referral needed to be to Dr. Rey Broussard. Please advise, I didn't see the note you said yesterday that you added for his referral.

## 2024-01-23 ENCOUNTER — TELEPHONE (OUTPATIENT)
Dept: PULMONOLOGY | Facility: CLINIC | Age: 44
End: 2024-01-23
Payer: COMMERCIAL

## 2024-01-23 NOTE — TELEPHONE ENCOUNTER
Spoke to moris regarding orders.     ----- Message from Elba Arteaga sent at 1/23/2024 10:03 AM CST -----  Regarding: Needs Medical Order  Contact: moris with [facility] at [phone] ext [phone]  Type: Needs Medical Order  Who Called:  moris guillen at [phone] ext [phone] fax [phone]  Additional Information: needs to get prescription for sleep study faxed back if patient is to continue therapy. Please call and advise. Thank you

## 2024-01-30 ENCOUNTER — TELEPHONE (OUTPATIENT)
Dept: PULMONOLOGY | Facility: CLINIC | Age: 44
End: 2024-01-30
Payer: COMMERCIAL

## 2024-01-30 NOTE — TELEPHONE ENCOUNTER
----- Message from Tatyana Rich sent at 1/30/2024  8:37 AM CST -----  Contact: Sleep Clinic  Type: Needs Medical Advice    Who Called:  [facility]  What is this regarding?:  They did a study last week. Calling to see if they got them and wanted to see if the Dr wanted sign a Rx to start the pt on a CPAP therapy.  Fax [phone]  Best Call Back Number:  [phone]  Additional Information:  Please call back at the phone number listed above to advise. Thank you!

## 2024-01-30 NOTE — TELEPHONE ENCOUNTER
Pt recently did a sleep study last week. Dr. Burleson wrote a script for CPAP therapy and faxed to [facility] [phone]

## 2024-02-27 ENCOUNTER — PATIENT MESSAGE (OUTPATIENT)
Dept: INTERNAL MEDICINE | Facility: CLINIC | Age: 44
End: 2024-02-27
Payer: COMMERCIAL

## 2024-06-05 ENCOUNTER — TELEPHONE (OUTPATIENT)
Dept: PULMONOLOGY | Facility: CLINIC | Age: 44
End: 2024-06-05
Payer: COMMERCIAL

## 2024-06-05 NOTE — TELEPHONE ENCOUNTER
Spoke to patient.  I informed him that he would have to sign a release of information for us to obtain those records.  AC.

## 2024-06-05 NOTE — TELEPHONE ENCOUNTER
----- Message from Nima Gonzalez sent at 6/5/2024  2:16 PM CDT -----  Regarding: advice  Contact: patient  Type: Needs Medical Advice  Who Called:  patient   Symptoms (please be specific):    How long has patient had these symptoms:    Pharmacy name and phone #:    Best Call Back Number: [phone]  Additional Information: Pt would like for the provider to request records from the hospital that he was at 3 or 4 weeks ago. [facility]  Fax: [phone]. Please call to advise. Thanks

## 2024-06-10 ENCOUNTER — TELEPHONE (OUTPATIENT)
Dept: PULMONOLOGY | Facility: CLINIC | Age: 44
End: 2024-06-10
Payer: COMMERCIAL

## 2024-06-10 ENCOUNTER — OFFICE VISIT (OUTPATIENT)
Dept: PULMONOLOGY | Facility: CLINIC | Age: 44
End: 2024-06-10
Payer: COMMERCIAL

## 2024-06-10 ENCOUNTER — PATIENT MESSAGE (OUTPATIENT)
Dept: INTERNAL MEDICINE | Facility: CLINIC | Age: 44
End: 2024-06-10
Payer: COMMERCIAL

## 2024-06-10 VITALS
BODY MASS INDEX: 29.09 KG/M2 | WEIGHT: 238.88 LBS | HEART RATE: 98 BPM | SYSTOLIC BLOOD PRESSURE: 124 MMHG | HEIGHT: 76 IN | DIASTOLIC BLOOD PRESSURE: 87 MMHG | OXYGEN SATURATION: 98 %

## 2024-06-10 DIAGNOSIS — D84.9 IMMUNODEFICIENCY: ICD-10-CM

## 2024-06-10 DIAGNOSIS — R93.5 ABNORMAL CT OF THE ABDOMEN: Primary | ICD-10-CM

## 2024-06-10 DIAGNOSIS — J47.9 BRONCHIECTASIS WITHOUT COMPLICATION: ICD-10-CM

## 2024-06-10 DIAGNOSIS — G47.33 OBSTRUCTIVE SLEEP APNEA: ICD-10-CM

## 2024-06-10 PROCEDURE — 3079F DIAST BP 80-89 MM HG: CPT | Mod: CPTII,S$GLB,, | Performed by: INTERNAL MEDICINE

## 2024-06-10 PROCEDURE — 99999 PR PBB SHADOW E&M-EST. PATIENT-LVL V: CPT | Mod: PBBFAC,,, | Performed by: INTERNAL MEDICINE

## 2024-06-10 PROCEDURE — 3008F BODY MASS INDEX DOCD: CPT | Mod: CPTII,S$GLB,, | Performed by: INTERNAL MEDICINE

## 2024-06-10 PROCEDURE — 99215 OFFICE O/P EST HI 40 MIN: CPT | Mod: S$GLB,,, | Performed by: INTERNAL MEDICINE

## 2024-06-10 PROCEDURE — 1159F MED LIST DOCD IN RCRD: CPT | Mod: CPTII,S$GLB,, | Performed by: INTERNAL MEDICINE

## 2024-06-10 PROCEDURE — 3044F HG A1C LEVEL LT 7.0%: CPT | Mod: CPTII,S$GLB,, | Performed by: INTERNAL MEDICINE

## 2024-06-10 PROCEDURE — 3074F SYST BP LT 130 MM HG: CPT | Mod: CPTII,S$GLB,, | Performed by: INTERNAL MEDICINE

## 2024-06-10 RX ORDER — TAMSULOSIN HYDROCHLORIDE 0.4 MG/1
1 CAPSULE ORAL
COMMUNITY
Start: 2024-05-14

## 2024-06-10 RX ORDER — BUDESONIDE 1 MG/2ML
INHALANT ORAL
COMMUNITY
Start: 2024-01-31

## 2024-06-10 RX ORDER — AZITHROMYCIN 500 MG/1
TABLET, FILM COATED ORAL
Qty: 3 TABLET | Refills: 5 | Status: SHIPPED | OUTPATIENT
Start: 2024-06-10

## 2024-06-10 RX ORDER — FLUTICASONE FUROATE, UMECLIDINIUM BROMIDE AND VILANTEROL TRIFENATATE 200; 62.5; 25 UG/1; UG/1; UG/1
1 POWDER RESPIRATORY (INHALATION) DAILY
Qty: 60 EACH | Refills: 11 | Status: SHIPPED | OUTPATIENT
Start: 2024-06-10

## 2024-06-10 NOTE — PROGRESS NOTES
6/10/2024    Juan José Ng  New Patient Consult    Chief Complaint   Patient presents with    Follow-up    Bronchiectasis         HPI:  6/10/2024 pt had hosp stay 5 weeks ago (had ct abd) with constipation and urine slowing..    developed increased back 2 wieeks ago and had ortho eval 3 hrs ago..      Having low back pain with good bowels and bladder function now.  Mucous minimal at tsp daily.  Uses inhalers minimally-- has had problem  getting habits...  Use azithro mwf with no great response.      No recent sinus infection after vaccine;.  Will use bag for hyperventilation..  Unable to get cpap mask to work.   Considered inspire.    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  Light headed sensation resolved.  Pt has no eds but exhausted...    Pt brings up yellow mucous  - - up to 1/4 cup daily.  Continious sinus infections had resolved with pneumovax/prevnar.    Pt had f/u pneumococcal titers trending low.     Patient Instructions   Pneumovax was likely dosed in 2015 and again in 2021.  Prevnar was dosed in 2019-- titers falling low--- re vaccine with prevnar (pneumovax not available) ---  azithromycin trial  3 days a wk.      Sleep study in 2018 had ahi 39.  Ahi very high -- should cause problem---  Suggest f/u for inspire device consideration.    Prednisone reasonable short term -- dose once a month if needed -- but should be having some lung improvement.....  pay attention to lungs-- note sputum production......    Check lung function -- if asthma component seen other therapies may help...          Stop trelegy. Use albuterol 3-4 puffs with aerobika twice daily-- would check breathing test -- bronchiectasis should not affect breathing directly........     mucous plugging should be just bronchiectasis-- trelegy and others would be good for asthma/copd.        Will ask staff to arrange home sleep study.  Will refer to ent for inpsire evaluation if stop breathing over15...  Has should be available  disclosure initial disclosure valving disclosure pretty quickly with the be recall her immuno if she had an answered and texture sometimes get eats a heavy has not taken shows no urine help daily might that would go away be better so a limited let me know finish up the good work done equipment immunity but I have not rather abrupt look at because it is is it could be a compromise and Rocephin that is perfect get a is is is is this is a VS go a that had where if they develop a rotating places.  He denies      9/28/2023 pt had had light headed sensation last 3.5  wks-- no change body position.  Buzzing in head.  No ppt or relief factor.  Will remit and varies and no problem.  Chr anxiety and no worse.   Has a xanax 0.25 but rarely uses and none since dizzy.      Ct chest done   Patient Instructions   Ct chest viewed from 9/26/2023 with bronchiectasis and mucous plugging...worse than seen 2022.    You likely have bronchiectasis from immune deficiency that was improved by pneumonia vaccine-- immune system may weaken -- should immune test recheck in future  and order placed..        Vest treatment with aerobika is dosed in office.  Mucous is mobilized.      Hyperventilation may cause light headed sensation -- could try re breathing from paper bag.    Anxiety may contribute-- would dose xanax if light headed sensation significant....      Would increase breathing medications as mucous plugs seen-- may contribute to symptoms although not directly.    Use trelegy once daily  Prednisone daily for 3-6 days-- may hype up???  Repeat if needed....    Use albuterol 2-3 puffs  before aerobika at least once or twice daily.    With increase mucous plugging -- do culture now.  May need antibiotic.  Culture sputum if yellow..    Ct was worse with mucous plugging-- need to try to stay on trelegy and hygiene with aerobika/albuterol    You improved sensation with vest and trelegy and paper bag today.    Heart had no significant calcium  build up seen.    9/21/2022 mucous min yellow colored.  Lost trelegy/symbicort-- pt still 1/4 cup daily.    Afb neg x 3, mac + once in 2018.  Uses symbicort/trelegy but lost in recent move, uses aerobika.  Mucous similar .    Patient Instructions   Would check regular cultures now and repeat in future.  Standing order for every 2 wk sputum cultures placed.  Need to do now and we need to contact you to instruct to use special antibiotic if needed  or use doxycycline.        Will place standing order to re submit sputum for MAC next yr.  Your positive MAC culture in 2018 may have been contaminate.      Would check pulmonary function once lungs treated for infection if needed.    You should stay on symbicort or trelegy and use aerobika-- we discuss cleaning aerobika.         Chronic sinus drainage  may respond to doxycyline -- await culture lung mucous       Could give vest trail if you are in [place]-- would be best after sputum cleared.    Need to get back after sputum culture to instruct specific antibiotic or doxycycline --- will plan to do phone f/u 8 wks.         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  .  Care in past limited by lack of coverage at times.    Pt had had severe freq sinus and lung infections. Looks like he had good ig levels in 2015 but had only 2/14 protection to pneumococcal serotypes.  He had 12/14 protection 2 months later appatently in response to vaccine. 2018 MAC was cultur and sensitive to macrolides. No rx.      Pt produces 1/4 cup yellow mucous daily, and has freq wheezes with no carballo.  Ct chest viewed from 4/7 2022 with bronchiectasis apparent - more impressive on 1/2/2019 ct chest.  Tree in bud nodules seen but looked less in 4/2022 than prior.      Pt has severe sinus still with occ abx but vague.  Was given symbicort recently.  No limits.   Patient Instructions   Check for mac resistance,  would start treatment once culture show sensitive.     Check for regular germs in  "lungs, psuedomonas likely    Use trelegy once daily or symbicort    Use aerobika -- might use vest if recurrent significant lung infections.  Use 15 breaths after trelegy or symbicort or albuterol twice daily    May treat infection if present.                The chief compliant  problem is new to me",   PFSH:  Past Medical History:   Diagnosis Date    Allergy     Anxiety     Depression      of psychiatric care     Major depression, chronic 7/6/2017    Psychiatric problem     Recurrent upper respiratory infection (URI)     Therapy          Past Surgical History:   Procedure Laterality Date    APPENDECTOMY      SINUS SURGERY       Social History     Tobacco Use    Smoking status: Never    Smokeless tobacco: Never   Substance Use Topics    Alcohol use: No    Drug use: No     Family History   Problem Relation Name Age of Onset    Diverticulitis Mother      Hypertension Father      Diabetes Paternal Aunt      Diabetes Paternal Uncle      Cancer Cousin          some type of intestinal cancer    Depression Brother      Melanoma Maternal Grandmother       Review of patient's allergies indicates:  No Known Allergies    Performance Status:The patient's activity level is functions out of house.      Review of Systems:  a review of eleven systems covering constitutional, Eye, HEENT, Psych, Respiratory, Cardiac, GI, , Musculoskeletal, Endocrine, Dermatologic was negative except for pertinent findings as listed ABOVE and below:  pertinent positive as above, rest is good       Exam:Comprehensive exam done. /87 (BP Location: Left arm, Patient Position: Sitting, BP Method: Large (Automatic))   Pulse 98   Ht 6' 4" (1.93 m)   Wt 108.3 kg (238 lb 13.9 oz)   SpO2 98% Comment: on room air at rest  BMI 29.08 kg/m²   Exam included Vitals as listed, and patient's appearance and affect and alertness and mood, oral exam for yeast and hygiene and pharynx lesions and Mallapatti (M) score, neck with inspection for jvd and masses " and thyroid abnormalities and lymph nodes (supraclavicular and infraclavicular nodes and axillary also examined and noted if abn), chest exam included symmetry and effort and fremitus and percussion and auscultation, cardiac exam included rhythm and gallops and murmur and rubs and jvd and edema, abdominal exam for mass and hepatosplenomegaly and tenderness and hernias and bowel sounds, Musculoskeletal exam with muscle tone and posture and mobility/gait and  strength, and skin for rashes and cyanosis and pallor and turgor, extremity for clubbing.  Findings were normal except for pertinent findings listed below:  M3, occ wheeze but min ,chest is symmetric, no distress, normal percussion, normal fremitus and rest good               Radiographs (ct chest and cxr) reviewed: view by direct vision  As above    Labs reviewed           PFT will be done and results to be reviewed later      Plan:  Clinical impression is apparently straight forward and impression with management as below.     Juan José was seen today for follow-up and bronchiectasis.    Diagnoses and all orders for this visit:    Abnormal CT of the abdomen  -     Ambulatory referral/consult to Gastroenterology; Future  -     fluticasone-umeclidin-vilanter (TRELEGY ELLIPTA) 200-62.5-25 mcg inhaler; Inhale 1 puff into the lungs once daily.    Bronchiectasis without complication    Immunodeficiency  -     azithromycin (ZITHROMAX) 500 MG tablet; Take one daily for 3 days , repeat for sinus/bronchial infection    Obstructive sleep apnea  -     Ambulatory referral/consult to ENT; Future                Follow up in about 1 year (around 6/10/2025), or if symptoms worsen or fail to improve.    Discussed with patient above for education the following:      Patient Instructions   You had belinda mesentery on ct abd 10/2023, you had unusual constipation and urine  problems in May-- with belinda mesentery again-- no mass mentioned from you--Sclerosing mesenteritis may be a  consideration but biopsy not typically done with no mass????      May use azithro one daily for 3 days..      You had home sleep study 2018 with ahi 39, you had ahi 16 last yr.      Your back pain diagnosis not clear    Should have gi follow up.      Use inhaler as needed      Would are intolerant of cpap and have sleep apnea.... inspire device would be reasonable...      Call if needed.....            Evaluation took 47 minutes

## 2024-06-10 NOTE — PATIENT INSTRUCTIONS
You had belinda mesentery on ct abd 10/2023, you had unusual constipation and urine  problems in May-- with belinda mesentery again-- no mass mentioned from you--Sclerosing mesenteritis may be a consideration but biopsy not typically done with no mass????      May use azithro one daily for 3 days..      You had home sleep study 2018 with ahi 39, you had ahi 16 last yr.      Your back pain diagnosis not clear    Should have gi follow up.      Use inhaler as needed      Would are intolerant of cpap and have sleep apnea.... inspire device would be reasonable...      Call if needed.....

## 2024-10-21 ENCOUNTER — OFFICE VISIT (OUTPATIENT)
Dept: INTERNAL MEDICINE | Facility: CLINIC | Age: 44
End: 2024-10-21
Payer: COMMERCIAL

## 2024-10-21 VITALS
WEIGHT: 238.13 LBS | HEIGHT: 76 IN | OXYGEN SATURATION: 98 % | DIASTOLIC BLOOD PRESSURE: 80 MMHG | SYSTOLIC BLOOD PRESSURE: 112 MMHG | BODY MASS INDEX: 29 KG/M2 | HEART RATE: 76 BPM

## 2024-10-21 DIAGNOSIS — J47.9 BRONCHIECTASIS WITHOUT COMPLICATION: ICD-10-CM

## 2024-10-21 DIAGNOSIS — F32.9 MAJOR DEPRESSION, CHRONIC: ICD-10-CM

## 2024-10-21 DIAGNOSIS — R59.0 MESENTERIC LYMPHADENOPATHY: ICD-10-CM

## 2024-10-21 DIAGNOSIS — Z00.00 VISIT FOR ANNUAL HEALTH EXAMINATION: Primary | ICD-10-CM

## 2024-10-21 DIAGNOSIS — G47.33 OSA (OBSTRUCTIVE SLEEP APNEA): ICD-10-CM

## 2024-10-21 DIAGNOSIS — R53.82 CHRONIC FATIGUE: ICD-10-CM

## 2024-10-21 DIAGNOSIS — E04.1 THYROID NODULE: ICD-10-CM

## 2024-10-21 DIAGNOSIS — D84.9 IMMUNODEFICIENCY: ICD-10-CM

## 2024-10-21 PROBLEM — D69.6 THROMBOCYTOPENIA: Status: RESOLVED | Noted: 2022-03-21 | Resolved: 2024-10-21

## 2024-10-21 PROCEDURE — 3079F DIAST BP 80-89 MM HG: CPT | Mod: CPTII,S$GLB,, | Performed by: INTERNAL MEDICINE

## 2024-10-21 PROCEDURE — 1160F RVW MEDS BY RX/DR IN RCRD: CPT | Mod: CPTII,S$GLB,, | Performed by: INTERNAL MEDICINE

## 2024-10-21 PROCEDURE — 3044F HG A1C LEVEL LT 7.0%: CPT | Mod: CPTII,S$GLB,, | Performed by: INTERNAL MEDICINE

## 2024-10-21 PROCEDURE — 3008F BODY MASS INDEX DOCD: CPT | Mod: CPTII,S$GLB,, | Performed by: INTERNAL MEDICINE

## 2024-10-21 PROCEDURE — 99999 PR PBB SHADOW E&M-EST. PATIENT-LVL V: CPT | Mod: PBBFAC,,, | Performed by: INTERNAL MEDICINE

## 2024-10-21 PROCEDURE — 3074F SYST BP LT 130 MM HG: CPT | Mod: CPTII,S$GLB,, | Performed by: INTERNAL MEDICINE

## 2024-10-21 PROCEDURE — 99396 PREV VISIT EST AGE 40-64: CPT | Mod: S$GLB,,, | Performed by: INTERNAL MEDICINE

## 2024-10-21 PROCEDURE — 1159F MED LIST DOCD IN RCRD: CPT | Mod: CPTII,S$GLB,, | Performed by: INTERNAL MEDICINE

## 2024-10-21 NOTE — PROGRESS NOTES
Answers submitted by the patient for this visit:  Review of Systems Questionnaire (Submitted on 10/15/2024)  activity change: No  unexpected weight change: No  neck pain: No  hearing loss: No  rhinorrhea: Yes  trouble swallowing: No  eye discharge: No  visual disturbance: No  chest tightness: No  wheezing: No  chest pain: No  palpitations: No  blood in stool: No  constipation: No  vomiting: No  diarrhea: No  polydipsia: No  polyuria: No  difficulty urinating: No  urgency: No  hematuria: No  joint swelling: No  arthralgias: No  headaches: No  weakness: No  confusion: No  dysphoric mood: No  INTERNAL MEDICINE ESTABLISHED PATIENT VISIT NOTE    Subjective:     Chief Complaint: Annual Exam       Patient ID: Juan José Ng is a 44 y.o. male with chronic rhinitis (see last note for details, has had sinus surgery in the past and followed by ID, allergy, and pulm in the past c neg immunodeficiency w/u, normal PFTs, normal CT sinuses/scope), depression (failed tx c cymbalta, lexapro, zoloft 2/2 inefficacy or s/e), last seen by me 10/2023, here today for annual exam.    To review, had been having issues c chest pain and had echo and stress test done last October which showed a normal EF and stress test was negative for ischemia.    Also had an abnormal CT chest/abd/pelvis c mesenteric LN which was repeated 12/2023 which showed the LN were chronic over several years (per report, c/w mesenteric panniculitis or adenitis sequela) but pt asymptomatic so monitoring conservatively.    States he was admitted to the hospital in May in [place] for constipation and also had trouble voiding and was in pain and had CT scan done which again noted noted the mesenteric LNs but was otherwise normal (no obstruction).    Was also noted to have incidental thyroid nodules on imaging so had dedicated thyroid u/s which showed mult nodules c plan to repeat u/s in a year (12/2024).    Since last visit, was seen by Pulm/Dr. Ryan Burleson last  "December for hx bronchiectasis and immunodeficiency and given Prednisone prn and Prevnar 20.  Also on Zpak q MWF.  Trelegy stopped and told to use Albuterol c aerobika BID and ref for sleep study c/w GISELL but failed CPAP in the past so was referred to ENT for inspire device.    Had f/u c Pulm in  and referred to gastro; however does not yet have appt c GI or ENT.    Is also seeing psych for mgmt of chronic fatigue and on Concerta which has helped.    Is also seeing an outside provider for his back and doing PT  (has a "slipped disc") which he states has helped.    States his wife is now expecting for their first child (having a baby girl).      Past Medical History:  Past Medical History:   Diagnosis Date    Allergy     Anxiety     Depression     Hx of psychiatric care     Major depression, chronic 2017    Psychiatric problem     Recurrent upper respiratory infection (URI)     Therapy        Home Medications:  Prior to Admission medications    Medication Sig Start Date End Date Taking? Authorizing Provider   albuterol (PROVENTIL/VENTOLIN HFA) 90 mcg/actuation inhaler 2 puffs every 4 hours as needed for cough, wheeze, or shortness of breath 23   Ryan Burleson MD   azithromycin (ZITHROMAX) 500 MG tablet One daily for yellow mucous, repeat if needed 10/11/23   Ryan Burleson MD   azithromycin (ZITHROMAX) 500 MG tablet Take one daily for 3 days , repeat for sinus/bronchial infection 6/10/24   Ryan Burleson MD   budesonide 1 mg/2 mL NbSp SMARTSI Puff(s) Irrigation Daily 24   Provider, Historical   cyanocobalamin, vitamin B-12, (B-12 COMPLIANCE) 1,000 mcg/mL Kit Inject 1 mL as directed every 28 days. 10/11/23   Carlee Lizarraga PA-C   fluocinonide (LIDEX) 0.05 % external solution Use hs for scalp 23   Anita Traylor MD   fluticasone-umeclidin-vilanter (TRELEGY ELLIPTA) 200-62.5-25 mcg inhaler Inhale 1 puff into the lungs once daily. 23   Ryan Burleson MD "   fluticasone-umeclidin-vilanter (TRELEGY ELLIPTA) 200-62.5-25 mcg inhaler Inhale 1 puff into the lungs once daily. 6/10/24   Ryan Burleson MD   hydrOXYzine pamoate (VISTARIL) 25 MG Cap Take 1-2 capsules by mouth three times a day as needed for anxiety 7/25/24      levomefolate calcium (ELFOLATE) 15 mg Tab Take 1 tablet by mouth once a day 9/21/23      methylphenidate HCl (CONCERTA) 18 MG CR tablet Take 1 tablet by mouth once a day 7/25/24      methylphenidate HCl (CONCERTA) 18 MG CR tablet Take 1 tablet by mouth once a day 7/25/24      methylphenidate HCl (CONCERTA) 18 MG CR tablet Take 1 tablet by mouth once a day 7/25/24      methylphenidate HCl (CONCERTA) 54 MG CR tablet Take 1 tablet (54 mg total) by mouth once daily. 4/8/24      methylphenidate HCl (CONCERTA) 54 MG CR tablet Take 1 tablet by mouth once a day 7/23/24      methylphenidate HCl (CONCERTA) 54 MG CR tablet Take 1 tablet by mouth once a day 7/25/24      methylphenidate HCl (CONCERTA) 54 MG CR tablet Take 1 tablet by mouth once a day 7/25/24      methylphenidate HCl (CONCERTA) 54 MG CR tablet Take 1 tablet by mouth once a day 7/25/24      methylphenidate HCl (RITALIN) 10 MG tablet Take 1 tablet by mouth every evening 11/4/23      modafiniL (PROVIGIL) 100 MG Tab Take 1 tablet (100 mg total) by mouth once a day as directed. 1/4/24      predniSONE (DELTASONE) 20 MG tablet Take one daily for 3 days and may repeat for shortness of breath. 12/11/23   Ryan Burleson MD   tamsulosin (FLOMAX) 0.4 mg Cap Take 1 capsule by mouth. 5/14/24   Provider, Historical       Allergies:  Review of patient's allergies indicates:  No Known Allergies    Social History:  Social History     Tobacco Use    Smoking status: Never    Smokeless tobacco: Never   Substance Use Topics    Alcohol use: No    Drug use: No        Review of Systems   Constitutional:  Negative for activity change and unexpected weight change.   HENT:  Positive for rhinorrhea. Negative for hearing loss  "and trouble swallowing.    Eyes:  Negative for discharge and visual disturbance.   Respiratory:  Negative for chest tightness and wheezing.    Cardiovascular:  Negative for chest pain and palpitations.   Gastrointestinal:  Negative for blood in stool, constipation, diarrhea and vomiting.   Endocrine: Negative for polydipsia and polyuria.   Genitourinary:  Negative for difficulty urinating, hematuria and urgency.   Musculoskeletal:  Negative for arthralgias, joint swelling and neck pain.   Neurological:  Negative for weakness and headaches.   Psychiatric/Behavioral:  Negative for confusion and dysphoric mood.          Health Maintenance:     Immunizations:   Influenza 11/2019, declined today.  TDap 6/2017  Prevnar 12/2019, Pneumovax 2/2021  COVID 2/2021, 3/2021, declined booster.     Cancer Screening:  Csc rec now, advised to schedule c GI to see if other testing needed c [facility].    Objective:   /80 (BP Location: Right arm, Patient Position: Sitting)   Pulse 76   Ht 6' 4" (1.93 m)   Wt 108 kg (238 lb 1.6 oz)   SpO2 98%   BMI 28.98 kg/m²        General: AAO x3, no apparent distress  HEENT: no cervical LAD, thyroid nodules present.  CV: RRR, no m/r/g  Pulm: Lungs CTAB, no crackles, no wheezes  Abd: s/NT/ND +BS  Extremities: no c/c/e    Labs:     Lab Results   Component Value Date    WBC 4.49 01/15/2024    HGB 15.0 01/15/2024    HCT 45.8 01/15/2024    MCV 88 01/15/2024     01/15/2024     Sodium   Date Value Ref Range Status   01/15/2024 139 136 - 145 mmol/L Final     Potassium   Date Value Ref Range Status   01/15/2024 4.3 3.5 - 5.1 mmol/L Final     Comment:     Anion Gap reference range revised on 4/28/2023     Chloride   Date Value Ref Range Status   01/15/2024 101 95 - 110 mmol/L Final     CO2   Date Value Ref Range Status   01/15/2024 26 22 - 31 mmol/L Final     Glucose   Date Value Ref Range Status   01/15/2024 94 70 - 110 mg/dL Final     Comment:     The ADA recommends the following guidelines for " fasting glucose:    Normal:       less than 100 mg/dL    Prediabetes:  100 mg/dL to 125 mg/dL    Diabetes:     126 mg/dL or higher       BUN   Date Value Ref Range Status   01/15/2024 11 9 - 21 mg/dL Final     Creatinine   Date Value Ref Range Status   01/15/2024 1.18 0.50 - 1.40 mg/dL Final     Calcium   Date Value Ref Range Status   01/15/2024 9.2 8.4 - 10.2 mg/dL Final     Total Protein   Date Value Ref Range Status   01/15/2024 7.5 6.0 - 8.4 g/dL Final     Albumin   Date Value Ref Range Status   01/15/2024 4.5 3.5 - 5.2 g/dL Final     Total Bilirubin   Date Value Ref Range Status   01/15/2024 1.2 0.2 - 1.3 mg/dL Final     Alkaline Phosphatase   Date Value Ref Range Status   01/15/2024 84 38 - 145 U/L Final     AST   Date Value Ref Range Status   01/15/2024 39 17 - 59 U/L Final     ALT   Date Value Ref Range Status   01/15/2024 43 0 - 50 U/L Final     Anion Gap   Date Value Ref Range Status   01/15/2024 12 5 - 12 mmol/L Final     Comment:     Anion Gap reference range revised on 4/28/2023     eGFR if    Date Value Ref Range Status   03/21/2022 >60.0 >60 mL/min/1.73 m^2 Final     eGFR if non    Date Value Ref Range Status   03/21/2022 >60.0 >60 mL/min/1.73 m^2 Final     Comment:     Calculation used to obtain the estimated glomerular filtration  rate (eGFR) is the CKD-EPI equation.        Lab Results   Component Value Date    HGBA1C 5.1 01/15/2024     Lab Results   Component Value Date    LDLCALC 139.2 01/15/2024     Lab Results   Component Value Date    TSH 1.410 01/15/2024         Assessment/Plan     Juan José was seen today for annual exam.    Diagnoses and all orders for this visit:    Visit for annual health examination  History and physical exam completed.  Health maintenance reviewed as above.  Labs from outside provider done in Jan so will schedule next labs for Jan.  -     CBC Auto Differential; Future  -     Comprehensive Metabolic Panel; Future  -     Hemoglobin A1C; Future  -      Lipid Panel; Future  -     TSH; Future    Mesenteric lymphadenopathy  As per HPI  Advised to schedule appt c GI at checkout and can get csc through them as well.    Bronchiectasis without complication  Immunodeficiency  Followed by Pulm, no acute issues  Cont routine f/u    GISELL (obstructive sleep apnea)  Chronic fatigue  Intolerant of cpap  Referred to ent for Inspire eval  Advised to schedule  Also seeing psych for chronic fatigue, better on Concerta as per HPI  Cont routine f/u, will defer to psych    Major depression, chronic  No acute issues    Thyroid nodule  Repeat u/s in Dec, will schedule.  -     US Soft Tissue Head Neck; Future        HM as above  RTC in one year, sooner if needed.    Lina Silva MD  Department of Internal Medicine - [facility]  10/21/2024

## 2024-10-28 ENCOUNTER — OFFICE VISIT (OUTPATIENT)
Dept: PULMONOLOGY | Facility: CLINIC | Age: 44
End: 2024-10-28
Payer: COMMERCIAL

## 2024-10-28 VITALS
HEIGHT: 76 IN | BODY MASS INDEX: 29.14 KG/M2 | SYSTOLIC BLOOD PRESSURE: 129 MMHG | HEART RATE: 82 BPM | OXYGEN SATURATION: 96 % | WEIGHT: 239.31 LBS | DIASTOLIC BLOOD PRESSURE: 84 MMHG

## 2024-10-28 DIAGNOSIS — R93.5 ABNORMAL CT OF THE ABDOMEN: ICD-10-CM

## 2024-10-28 DIAGNOSIS — J32.0 CHRONIC MAXILLARY SINUSITIS: Primary | ICD-10-CM

## 2024-10-28 DIAGNOSIS — D84.9 IMMUNE DEFICIENCY DISORDER: ICD-10-CM

## 2024-10-28 DIAGNOSIS — J47.1 BRONCHIECTASIS WITH (ACUTE) EXACERBATION: ICD-10-CM

## 2024-10-28 PROCEDURE — 99999 PR PBB SHADOW E&M-EST. PATIENT-LVL III: CPT | Mod: PBBFAC,,, | Performed by: INTERNAL MEDICINE

## 2024-10-28 PROCEDURE — 99214 OFFICE O/P EST MOD 30 MIN: CPT | Mod: S$GLB,,, | Performed by: INTERNAL MEDICINE

## 2024-10-28 PROCEDURE — 3079F DIAST BP 80-89 MM HG: CPT | Mod: CPTII,S$GLB,, | Performed by: INTERNAL MEDICINE

## 2024-10-28 PROCEDURE — 3074F SYST BP LT 130 MM HG: CPT | Mod: CPTII,S$GLB,, | Performed by: INTERNAL MEDICINE

## 2024-10-28 PROCEDURE — 3044F HG A1C LEVEL LT 7.0%: CPT | Mod: CPTII,S$GLB,, | Performed by: INTERNAL MEDICINE

## 2024-10-28 PROCEDURE — 3008F BODY MASS INDEX DOCD: CPT | Mod: CPTII,S$GLB,, | Performed by: INTERNAL MEDICINE

## 2024-10-28 RX ORDER — ALBUTEROL SULFATE 90 UG/1
INHALANT RESPIRATORY (INHALATION)
Qty: 18 G | Refills: 11 | Status: SHIPPED | OUTPATIENT
Start: 2024-10-28

## 2024-10-28 RX ORDER — MELOXICAM 7.5 MG/1
7.5 TABLET ORAL DAILY PRN
COMMUNITY
Start: 2024-09-06

## 2024-10-28 RX ORDER — FLUTICASONE FUROATE, UMECLIDINIUM BROMIDE AND VILANTEROL TRIFENATATE 200; 62.5; 25 UG/1; UG/1; UG/1
1 POWDER RESPIRATORY (INHALATION) DAILY
Qty: 60 EACH | Refills: 11 | Status: SHIPPED | OUTPATIENT
Start: 2024-10-28

## 2024-10-28 RX ORDER — LEVOFLOXACIN 500 MG/1
500 TABLET, FILM COATED ORAL DAILY
Qty: 10 TABLET | Refills: 2 | Status: SHIPPED | OUTPATIENT
Start: 2024-10-28

## 2024-10-28 RX ORDER — PREDNISONE 20 MG/1
TABLET ORAL
Qty: 12 TABLET | Refills: 0 | Status: SHIPPED | OUTPATIENT
Start: 2024-10-28

## 2024-12-24 DIAGNOSIS — J47.1 BRONCHIECTASIS WITH (ACUTE) EXACERBATION: ICD-10-CM

## 2024-12-27 RX ORDER — PREDNISONE 20 MG/1
TABLET ORAL
Qty: 12 TABLET | Refills: 0 | Status: SHIPPED | OUTPATIENT
Start: 2024-12-27

## 2025-04-28 ENCOUNTER — OFFICE VISIT (OUTPATIENT)
Dept: PULMONOLOGY | Facility: CLINIC | Age: 45
End: 2025-04-28
Payer: COMMERCIAL

## 2025-04-28 VITALS
OXYGEN SATURATION: 95 % | HEIGHT: 76 IN | WEIGHT: 239.44 LBS | SYSTOLIC BLOOD PRESSURE: 117 MMHG | HEART RATE: 92 BPM | DIASTOLIC BLOOD PRESSURE: 80 MMHG | BODY MASS INDEX: 29.16 KG/M2

## 2025-04-28 DIAGNOSIS — R93.5 ABNORMAL CT OF THE ABDOMEN: ICD-10-CM

## 2025-04-28 DIAGNOSIS — G47.33 OBSTRUCTIVE SLEEP APNEA: ICD-10-CM

## 2025-04-28 DIAGNOSIS — J47.1 BRONCHIECTASIS WITH (ACUTE) EXACERBATION: ICD-10-CM

## 2025-04-28 PROCEDURE — 3074F SYST BP LT 130 MM HG: CPT | Mod: CPTII,S$GLB,, | Performed by: INTERNAL MEDICINE

## 2025-04-28 PROCEDURE — 1159F MED LIST DOCD IN RCRD: CPT | Mod: CPTII,S$GLB,, | Performed by: INTERNAL MEDICINE

## 2025-04-28 PROCEDURE — 3079F DIAST BP 80-89 MM HG: CPT | Mod: CPTII,S$GLB,, | Performed by: INTERNAL MEDICINE

## 2025-04-28 PROCEDURE — 3008F BODY MASS INDEX DOCD: CPT | Mod: CPTII,S$GLB,, | Performed by: INTERNAL MEDICINE

## 2025-04-28 PROCEDURE — 99999 PR PBB SHADOW E&M-EST. PATIENT-LVL V: CPT | Mod: PBBFAC,,, | Performed by: INTERNAL MEDICINE

## 2025-04-28 PROCEDURE — 99213 OFFICE O/P EST LOW 20 MIN: CPT | Mod: S$GLB,,, | Performed by: INTERNAL MEDICINE

## 2025-04-28 RX ORDER — FLUTICASONE FUROATE, UMECLIDINIUM BROMIDE AND VILANTEROL TRIFENATATE 200; 62.5; 25 UG/1; UG/1; UG/1
1 POWDER RESPIRATORY (INHALATION) DAILY
Qty: 60 EACH | Refills: 11 | Status: SHIPPED | OUTPATIENT
Start: 2025-04-28

## 2025-04-28 RX ORDER — AZITHROMYCIN 500 MG/1
TABLET, FILM COATED ORAL
Qty: 3 TABLET | Refills: 3 | Status: SHIPPED | OUTPATIENT
Start: 2025-04-28

## 2025-04-28 RX ORDER — DEXMETHYLPHENIDATE HYDROCHLORIDE 30 MG/1
1 CAPSULE, EXTENDED RELEASE ORAL EVERY MORNING
COMMUNITY
Start: 2025-02-18

## 2025-04-28 RX ORDER — LEVOFLOXACIN 500 MG/1
500 TABLET, FILM COATED ORAL DAILY
Qty: 10 TABLET | Refills: 1 | Status: SHIPPED | OUTPATIENT
Start: 2025-05-05

## 2025-04-28 NOTE — PATIENT INSTRUCTIONS
You are intolerant of cpap with severe sleep apnea-- need to find recent sleep study -- you have severe fatigue and need to sleep better.   Will ask staff to arrange ent evaluation in [facility].        Lungs exacerbating-- use azithromycin -- may use levaquin next wk if not clearing.  May need culture        Use trelegy daily -- should be better covered

## 2025-04-28 NOTE — LETTER
April 28, 2025    Re:Juan José Ng  [address] [address]             [facility]  [address]  [address]  [facility] [address]  Phone: [phone]  Fax: [phone] To whom it may concern:    Mr Can has severe daytime sleepiness for sleep apnea, and poor focus.  He has ADD with depression.  He also has  immune deficiency and bronchiectasis.   Mr Can is disabled completely from multiple issues...    Sincerely,        Ryan Burleson MD  Pulmonary Diseases

## 2025-04-28 NOTE — PROGRESS NOTES
2025    Juan José Ng  New Patient Consult    Chief Complaint   Patient presents with    Follow-up    Cough         HPI:    2025 the patient has a  daughter at home.  He is feeling more overwhelmed.  He was hoping he would function better with the birth of his daughter.  He seems to be stressed more.  pt relates feeling poorly with loss ashley and depression, crippling add.  Pt has ongoing therapy for depression/add...  on trintellix and add rx.  Talk therapy.  The patient has had significant sleep apnea with the his lab sleep study showing apnea-hypopnea index around 16.  He has had trouble tolerating CPAP    Pt has osas-- no cpap. Had had sleep st udy with  high ahi..   interseted in inspire    Pt havign congestion and foul odor to mucous-bronchiectasis is flared somewhat.  He has been less than optimal with compliance to Trelegy.  Sputum cultures in the past have been nondiagnostic.      10/28/2024 pt having sense congestion with mucous from sinuses and clearing from lungs..  Pt took azithro- as had sense odor from mucous.  Odor cleared.    No noct worsening but has excess mucous in early am -- 7 am or so..  Pt to have gi follow up-- belinda mesentary???  Back lingering still -- severe pain gone but still weak..     Made appt for inspire device but   Patient Instructions   Ct chest 2023 had mucous plugging of airways with bronchiectasis  You cleared sinus infection with azitromycin -- mucous from lungs lingered and likely has resistent germ.  Would take levaquin now -- may repeat.  Would try to culture prior to levaquin if able...  Would recommend sputum culture if able-- do for relapse  Your culture  did not show bad germs (staph and pseudomonas).  Ct sinuses and ct chest viewed  You are at risk for bad germs-- if you  get bad germs you would be more likely to relapse   Use albuterol daily or trelegy to help clear mucous  May use prednisone 20 mg daily for 3 days if cough and mucous  a problem as may help clear mucous....  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  Your immunity should be good (might recheck if having freq  Infections but not likely to help.  So -- take levaquin (culture needed into future but not urgent now)  take prednisone and use inhalers.  Call if not clearing but would need culture result  Call if problem - -recheck in 3-6 months    6/10/2024 pt had hosp stay 5 weeks ago (had ct abd) with constipation and urine slowing..    developed increased back 2 wieeks ago and had ortho eval 3 hrs ago..  Having low back pain with good bowels and bladder function now.  Mucous minimal at tsp daily.  Uses inhalers minimally-- has had problem  getting habits...  Use azithro mwf with no great response.    No recent sinus infection after vaccine;.  Will use bag for hyperventilation..  Unable to get cpap mask to work.   Considered inspire.  Patient Instructions   You had belinda mesentery on ct abd 10/2023, you had unusual constipation and urine  problems in May-- with belinda mesentery again-- no mass mentioned from you--Sclerosing mesenteritis may be a consideration but biopsy not typically done with no mass????  May use azithro one daily for 3 days..  You had home sleep study 2018 with ahi 39, you had ahi 16 last yr.    Your back pain diagnosis not clear  Should have gi follow up.    Use inhaler as needed  Would are intolerant of cpap and have sleep apnea.... inspire device would be reasonable...  Call if needed.....      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  Light headed sensation resolved.  Pt has no eds but exhausted...  Pt brings up yellow mucous  - - up to 1/4 cup daily.  Continious sinus infections had resolved with  pneumovax/prevnar.  Pt had f/u pneumococcal titers trending low.     Patient Instructions   Pneumovax was likely dosed in 2015 and again in 2021.  Prevnar was dosed in 2019-- titers falling low--- re vaccine with prevnar (pneumovax not available) ---  azithromycin trial  3 days a wk.      Sleep study in 2018 had ahi 39.  Ahi very high -- should cause problem---  Suggest f/u for inspire device consideration.    Prednisone reasonable short term -- dose once a month if needed -- but should be having some lung improvement.....  pay attention to lungs-- note sputum production......    Check lung function -- if asthma component seen other therapies may help...          Stop trelegy. Use albuterol 3-4 puffs with aerobika twice daily-- would check breathing test -- bronchiectasis should not affect breathing directly........     mucous plugging should be just bronchiectasis-- trelegy and others would be good for asthma/copd.        Will ask staff to arrange home sleep study.  Will refer to ent for inpsire evaluation if stop breathing over15...  Has should be available disclosure initial disclosure valving disclosure pretty quickly with the be recall her immuno if she had an answered and texture sometimes get eats a heavy has not taken shows no urine help daily might that would go away be better so a limited let me know finish up the good work done equipment immunity but I have not rather abrupt look at because it is is it could be a compromise and Rocephin that is perfect get a is is is is this is a VS go a that had where if they develop a rotating places.  He denies      9/28/2023 pt had had light headed sensation last 3.5  wks-- no change body position.  Buzzing in head.  No ppt or relief factor.  Will remit and varies and no problem.  Chr anxiety and no worse.   Has a xanax 0.25 but rarely uses and none since dizzy.      Ct chest done   Patient Instructions   Ct chest viewed from 9/26/2023 with bronchiectasis and mucous  plugging...worse than seen 2022.    You likely have bronchiectasis from immune deficiency that was improved by pneumonia vaccine-- immune system may weaken -- should immune test recheck in future  and order placed..        Vest treatment with aerobika is dosed in office.  Mucous is mobilized.      Hyperventilation may cause light headed sensation -- could try re breathing from paper bag.    Anxiety may contribute-- would dose xanax if light headed sensation significant....      Would increase breathing medications as mucous plugs seen-- may contribute to symptoms although not directly.    Use trelegy once daily  Prednisone daily for 3-6 days-- may hype up???  Repeat if needed....    Use albuterol 2-3 puffs  before aerobika at least once or twice daily.    With increase mucous plugging -- do culture now.  May need antibiotic.  Culture sputum if yellow..    Ct was worse with mucous plugging-- need to try to stay on trelegy and hygiene with aerobika/albuterol    You improved sensation with vest and trelegy and paper bag today.    Heart had no significant calcium build up seen.    9/21/2022 mucous min yellow colored.  Lost trelegy/symbicort-- pt still 1/4 cup daily.    Afb neg x 3, mac + once in 2018.  Uses symbicort/trelegy but lost in recent move, uses aerobika.  Mucous similar .    Patient Instructions   Would check regular cultures now and repeat in future.  Standing order for every 2 wk sputum cultures placed.  Need to do now and we need to contact you to instruct to use special antibiotic if needed  or use doxycycline.        Will place standing order to re submit sputum for MAC next yr.  Your positive MAC culture in 2018 may have been contaminate.      Would check pulmonary function once lungs treated for infection if needed.    You should stay on symbicort or trelegy and use aerobika-- we discuss cleaning aerobika.         Chronic sinus drainage  may respond to doxycyline -- await culture lung mucous       Could  "give vest trail if you are in [place]-- would be best after sputum cleared.    Need to get back after sputum culture to instruct specific antibiotic or doxycycline --- will plan to do phone f/u 8 wks.         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  .  Care in past limited by lack of coverage at times.    Pt had had severe freq sinus and lung infections. Looks like he had good ig levels in 2015 but had only 2/14 protection to pneumococcal serotypes.  He had 12/14 protection 2 months later appatently in response to vaccine. 2018 MAC was cultur and sensitive to macrolides. No rx.      Pt produces 1/4 cup yellow mucous daily, and has freq wheezes with no carballo.  Ct chest viewed from 4/7 2022 with bronchiectasis apparent - more impressive on 1/2/2019 ct chest.  Tree in bud nodules seen but looked less in 4/2022 than prior.      Pt has severe sinus still with occ abx but vague.  Was given symbicort recently.  No limits.   Patient Instructions   Check for mac resistance,  would start treatment once culture show sensitive.     Check for regular germs in lungs, psuedomonas likely    Use trelegy once daily or symbicort    Use aerobika -- might use vest if recurrent significant lung infections.  Use 15 breaths after trelegy or symbicort or albuterol twice daily    May treat infection if present.                The chief compliant  problem is new to me",   PFSH:  Past Medical History:   Diagnosis Date    Allergy     Anxiety     Depression     Hx of psychiatric care     Major depression, chronic 7/6/2017    Psychiatric problem     Recurrent upper respiratory infection (URI)     Therapy          Past Surgical History:   Procedure Laterality Date    APPENDECTOMY      SINUS SURGERY       Social History     Tobacco Use    Smoking status: Never    Smokeless tobacco: Never   Substance Use Topics    Alcohol use: No    Drug use: No     Family History   Problem Relation Name Age of Onset    Diverticulitis Mother      " "Hypertension Father      Diabetes Paternal Aunt      Diabetes Paternal Uncle      Cancer Cousin          some type of intestinal cancer    Depression Brother      Melanoma Maternal Grandmother       Review of patient's allergies indicates:  No Known Allergies    Performance Status:The patient's activity level is functions out of house.      Review of Systems:  a review of eleven systems covering constitutional, Eye, HEENT, Psych, Respiratory, Cardiac, GI, , Musculoskeletal, Endocrine, Dermatologic was negative except for pertinent findings as listed ABOVE and below:  pertinent positive as above, rest is good       Exam:Comprehensive exam done. /80 (BP Location: Right arm, Patient Position: Sitting)   Pulse 92   Ht 6' 4" (1.93 m)   Wt 108.6 kg (239 lb 6.7 oz)   SpO2 95% Comment: on room air at rest  BMI 29.14 kg/m²   Exam included Vitals as listed, and patient's appearance and affect and alertness and mood, oral exam for yeast and hygiene and pharynx lesions and Mallapatti (M) score, neck with inspection for jvd and masses and thyroid abnormalities and lymph nodes (supraclavicular and infraclavicular nodes and axillary also examined and noted if abn), chest exam included symmetry and effort and fremitus and percussion and auscultation, cardiac exam included rhythm and gallops and murmur and rubs and jvd and edema, abdominal exam for mass and hepatosplenomegaly and tenderness and hernias and bowel sounds, Musculoskeletal exam with muscle tone and posture and mobility/gait and  strength, and skin for rashes and cyanosis and pallor and turgor, extremity for clubbing.  Findings were normal except for pertinent findings listed below:  M3, occ wheeze but min ,chest is symmetric, no distress, normal percussion, normal fremitus and rest good               Radiographs (ct chest and cxr) reviewed: view by direct vision  As above    Labs reviewed           PFT will be done and results to be reviewed " later      Plan:  Clinical impression is apparently straight forward and impression with management as below.     Juan José was seen today for follow-up and cough.    Diagnoses and all orders for this visit:    Bronchiectasis with (acute) exacerbation  -     azithromycin (ZITHROMAX) 500 MG tablet; One daily for yellow mucous, repeat if needed  -     levoFLOXacin (LEVAQUIN) 500 MG tablet; Take 1 tablet (500 mg total) by mouth once daily.  -     fluticasone-umeclidin-vilanter (TRELEGY ELLIPTA) 200-62.5-25 mcg inhaler; Inhale 1 puff into the lungs once daily.  -     Culture, Respiratory with Gram Stain; Standing    Abnormal CT of the abdomen  -     fluticasone-umeclidin-vilanter (TRELEGY ELLIPTA) 200-62.5-25 mcg inhaler; Inhale 1 puff into the lungs once daily.    Obstructive sleep apnea  -     Ambulatory referral/consult to ENT; Future                    No follow-ups on file.    Discussed with patient above for education the following:      Patient Instructions   You are intolerant of cpap with severe sleep apnea-- need to find recent sleep study -- you have severe fatigue and need to sleep better.   Will ask staff to arrange ent evaluation in [facility].        Lungs exacerbating-- use azithromycin -- may use levaquin next wk if not clearing.  May need culture        Use trelegy daily -- should be better covered

## 2025-04-29 ENCOUNTER — TELEPHONE (OUTPATIENT)
Dept: PULMONOLOGY | Facility: CLINIC | Age: 45
End: 2025-04-29
Payer: COMMERCIAL

## 2025-04-29 NOTE — TELEPHONE ENCOUNTER
Faxed referral, notes, sleep study and insurance to Dr. Broussard (ENT) for Inspire device eval.  [phone]

## 2025-08-18 ENCOUNTER — TELEPHONE (OUTPATIENT)
Dept: UROLOGY | Facility: CLINIC | Age: 45
End: 2025-08-18
Payer: COMMERCIAL

## 2025-08-20 ENCOUNTER — TELEPHONE (OUTPATIENT)
Dept: UROLOGY | Facility: CLINIC | Age: 45
End: 2025-08-20
Payer: COMMERCIAL

## 2025-08-25 ENCOUNTER — OFFICE VISIT (OUTPATIENT)
Dept: UROLOGY | Facility: CLINIC | Age: 45
End: 2025-08-25
Payer: COMMERCIAL

## 2025-08-25 VITALS — WEIGHT: 258.38 LBS | HEIGHT: 76 IN | BODY MASS INDEX: 31.46 KG/M2

## 2025-08-25 DIAGNOSIS — N20.0 KIDNEY STONE: Primary | ICD-10-CM

## 2025-08-25 PROCEDURE — 1159F MED LIST DOCD IN RCRD: CPT | Mod: CPTII,S$GLB,,

## 2025-08-25 PROCEDURE — 1160F RVW MEDS BY RX/DR IN RCRD: CPT | Mod: CPTII,S$GLB,,

## 2025-08-25 PROCEDURE — 99214 OFFICE O/P EST MOD 30 MIN: CPT | Mod: S$GLB,,,

## 2025-08-25 PROCEDURE — 3008F BODY MASS INDEX DOCD: CPT | Mod: CPTII,S$GLB,,

## 2025-08-25 PROCEDURE — 99999 PR PBB SHADOW E&M-EST. PATIENT-LVL IV: CPT | Mod: PBBFAC,,,

## 2025-08-25 PROCEDURE — 82365 CALCULUS SPECTROSCOPY: CPT
